# Patient Record
Sex: FEMALE | Race: WHITE | NOT HISPANIC OR LATINO | Employment: FULL TIME | URBAN - METROPOLITAN AREA
[De-identification: names, ages, dates, MRNs, and addresses within clinical notes are randomized per-mention and may not be internally consistent; named-entity substitution may affect disease eponyms.]

---

## 2018-01-15 ENCOUNTER — OFFICE VISIT CONVERTED (OUTPATIENT)
Dept: NEUROSURGERY | Facility: CLINIC | Age: 58
End: 2018-01-15
Attending: PHYSICIAN ASSISTANT

## 2018-07-11 ENCOUNTER — CONVERSION ENCOUNTER (OUTPATIENT)
Dept: MAMMOGRAPHY | Facility: HOSPITAL | Age: 58
End: 2018-07-11

## 2021-01-21 ENCOUNTER — IMMUNIZATION (OUTPATIENT)
Dept: VACCINE CLINIC | Facility: HOSPITAL | Age: 61
End: 2021-01-21

## 2021-01-21 PROCEDURE — 0001A: CPT | Performed by: THORACIC SURGERY (CARDIOTHORACIC VASCULAR SURGERY)

## 2021-01-21 PROCEDURE — 91300 HC SARSCOV02 VAC 30MCG/0.3ML IM: CPT | Performed by: THORACIC SURGERY (CARDIOTHORACIC VASCULAR SURGERY)

## 2021-02-09 ENCOUNTER — IMMUNIZATION (OUTPATIENT)
Dept: VACCINE CLINIC | Facility: HOSPITAL | Age: 61
End: 2021-02-09

## 2021-02-09 PROCEDURE — 0002A: CPT | Performed by: THORACIC SURGERY (CARDIOTHORACIC VASCULAR SURGERY)

## 2021-02-09 PROCEDURE — 91300 HC SARSCOV02 VAC 30MCG/0.3ML IM: CPT | Performed by: THORACIC SURGERY (CARDIOTHORACIC VASCULAR SURGERY)

## 2021-02-11 ENCOUNTER — APPOINTMENT (OUTPATIENT)
Dept: VACCINE CLINIC | Facility: HOSPITAL | Age: 61
End: 2021-02-11

## 2021-05-16 VITALS
WEIGHT: 218 LBS | SYSTOLIC BLOOD PRESSURE: 150 MMHG | HEIGHT: 65 IN | BODY MASS INDEX: 36.32 KG/M2 | DIASTOLIC BLOOD PRESSURE: 96 MMHG

## 2021-08-05 PROCEDURE — U0003 INFECTIOUS AGENT DETECTION BY NUCLEIC ACID (DNA OR RNA); SEVERE ACUTE RESPIRATORY SYNDROME CORONAVIRUS 2 (SARS-COV-2) (CORONAVIRUS DISEASE [COVID-19]), AMPLIFIED PROBE TECHNIQUE, MAKING USE OF HIGH THROUGHPUT TECHNOLOGIES AS DESCRIBED BY CMS-2020-01-R: HCPCS | Performed by: PHYSICIAN ASSISTANT

## 2021-08-07 PROCEDURE — U0003 INFECTIOUS AGENT DETECTION BY NUCLEIC ACID (DNA OR RNA); SEVERE ACUTE RESPIRATORY SYNDROME CORONAVIRUS 2 (SARS-COV-2) (CORONAVIRUS DISEASE [COVID-19]), AMPLIFIED PROBE TECHNIQUE, MAKING USE OF HIGH THROUGHPUT TECHNOLOGIES AS DESCRIBED BY CMS-2020-01-R: HCPCS | Performed by: PHYSICIAN ASSISTANT

## 2021-08-11 ENCOUNTER — APPOINTMENT (OUTPATIENT)
Dept: GENERAL RADIOLOGY | Facility: HOSPITAL | Age: 61
End: 2021-08-11

## 2021-08-11 ENCOUNTER — HOSPITAL ENCOUNTER (INPATIENT)
Facility: HOSPITAL | Age: 61
LOS: 3 days | Discharge: HOME | End: 2021-08-14
Attending: INTERNAL MEDICINE | Admitting: INTERNAL MEDICINE

## 2021-08-11 DIAGNOSIS — I21.4 NSTEMI, INITIAL EPISODE OF CARE (HCC): Primary | ICD-10-CM

## 2021-08-11 DIAGNOSIS — R07.9 CHEST PAIN IN ADULT: ICD-10-CM

## 2021-08-11 LAB
ALBUMIN SERPL-MCNC: 4.3 G/DL (ref 3.5–5.2)
ALBUMIN/GLOB SERPL: 1.3 G/DL
ALP SERPL-CCNC: 112 U/L (ref 39–117)
ALT SERPL W P-5'-P-CCNC: 17 U/L (ref 1–33)
ANION GAP SERPL CALCULATED.3IONS-SCNC: 16.3 MMOL/L (ref 5–15)
AST SERPL-CCNC: 17 U/L (ref 1–32)
BASOPHILS # BLD AUTO: 0.04 10*3/MM3 (ref 0–0.2)
BASOPHILS NFR BLD AUTO: 0.6 % (ref 0–1.5)
BILIRUB SERPL-MCNC: 0.4 MG/DL (ref 0–1.2)
BUN SERPL-MCNC: 13 MG/DL (ref 8–23)
BUN/CREAT SERPL: 12.4 (ref 7–25)
CALCIUM SPEC-SCNC: 9.5 MG/DL (ref 8.6–10.5)
CHLORIDE SERPL-SCNC: 103 MMOL/L (ref 98–107)
CK MB SERPL-CCNC: 4 NG/ML
CK SERPL-CCNC: 82 U/L (ref 20–180)
CO2 SERPL-SCNC: 19.7 MMOL/L (ref 22–29)
CREAT BLDA-MCNC: 1 MG/DL
CREAT SERPL-MCNC: 1.05 MG/DL (ref 0.57–1)
D DIMER PPP FEU-MCNC: 0.49 MG/L (FEU) (ref 0–0.59)
DEPRECATED RDW RBC AUTO: 42.2 FL (ref 37–54)
EOSINOPHIL # BLD AUTO: 0.08 10*3/MM3 (ref 0–0.4)
EOSINOPHIL NFR BLD AUTO: 1.2 % (ref 0.3–6.2)
ERYTHROCYTE [DISTWIDTH] IN BLOOD BY AUTOMATED COUNT: 12.8 % (ref 12.3–15.4)
GFR SERPL CREATININE-BSD FRML MDRD: 53 ML/MIN/1.73
GLOBULIN UR ELPH-MCNC: 3.2 GM/DL
GLUCOSE BLDC GLUCOMTR-MCNC: 173 MG/DL (ref 70–99)
GLUCOSE SERPL-MCNC: 177 MG/DL (ref 65–99)
HCT VFR BLD AUTO: 42.6 % (ref 34–46.6)
HGB BLD-MCNC: 14.7 G/DL (ref 12–15.9)
HOLD SPECIMEN: NORMAL
HOLD SPECIMEN: NORMAL
IMM GRANULOCYTES # BLD AUTO: 0.03 10*3/MM3 (ref 0–0.05)
IMM GRANULOCYTES NFR BLD AUTO: 0.4 % (ref 0–0.5)
INR PPP: 0.93 (ref 2–3)
LYMPHOCYTES # BLD AUTO: 1.3 10*3/MM3 (ref 0.7–3.1)
LYMPHOCYTES NFR BLD AUTO: 18.9 % (ref 19.6–45.3)
MAGNESIUM SERPL-MCNC: 1.9 MG/DL (ref 1.6–2.4)
MCH RBC QN AUTO: 31.2 PG (ref 26.6–33)
MCHC RBC AUTO-ENTMCNC: 34.5 G/DL (ref 31.5–35.7)
MCV RBC AUTO: 90.4 FL (ref 79–97)
MONOCYTES # BLD AUTO: 0.46 10*3/MM3 (ref 0.1–0.9)
MONOCYTES NFR BLD AUTO: 6.7 % (ref 5–12)
NEUTROPHILS NFR BLD AUTO: 4.96 10*3/MM3 (ref 1.7–7)
NEUTROPHILS NFR BLD AUTO: 72.2 % (ref 42.7–76)
NRBC BLD AUTO-RTO: 0 /100 WBC (ref 0–0.2)
PLATELET # BLD AUTO: 242 10*3/MM3 (ref 140–450)
PMV BLD AUTO: 9.3 FL (ref 6–12)
POTASSIUM SERPL-SCNC: 3.6 MMOL/L (ref 3.5–5.2)
PROT SERPL-MCNC: 7.5 G/DL (ref 6–8.5)
PROTHROMBIN TIME: 10.3 SECONDS (ref 9.4–12)
QT INTERVAL: 410 MS
QT INTERVAL: 426 MS
QT INTERVAL: 449 MS
RBC # BLD AUTO: 4.71 10*6/MM3 (ref 3.77–5.28)
SODIUM SERPL-SCNC: 139 MMOL/L (ref 136–145)
WBC # BLD AUTO: 6.87 10*3/MM3 (ref 3.4–10.8)
WHOLE BLOOD HOLD SPECIMEN: NORMAL

## 2021-08-11 PROCEDURE — 93005 ELECTROCARDIOGRAM TRACING: CPT | Performed by: INTERNAL MEDICINE

## 2021-08-11 PROCEDURE — 82962 GLUCOSE BLOOD TEST: CPT

## 2021-08-11 PROCEDURE — 25010000002 ENOXAPARIN PER 10 MG: Performed by: INTERNAL MEDICINE

## 2021-08-11 PROCEDURE — G0378 HOSPITAL OBSERVATION PER HR: HCPCS

## 2021-08-11 PROCEDURE — 85025 COMPLETE CBC W/AUTO DIFF WBC: CPT | Performed by: EMERGENCY MEDICINE

## 2021-08-11 PROCEDURE — 82565 ASSAY OF CREATININE: CPT

## 2021-08-11 PROCEDURE — 99222 1ST HOSP IP/OBS MODERATE 55: CPT | Performed by: INTERNAL MEDICINE

## 2021-08-11 PROCEDURE — 80053 COMPREHEN METABOLIC PANEL: CPT | Performed by: EMERGENCY MEDICINE

## 2021-08-11 PROCEDURE — 84484 ASSAY OF TROPONIN QUANT: CPT

## 2021-08-11 PROCEDURE — 99285 EMERGENCY DEPT VISIT HI MDM: CPT

## 2021-08-11 PROCEDURE — 25010000002 HEPARIN (PORCINE) PER 1000 UNITS: Performed by: EMERGENCY MEDICINE

## 2021-08-11 PROCEDURE — U0003 INFECTIOUS AGENT DETECTION BY NUCLEIC ACID (DNA OR RNA); SEVERE ACUTE RESPIRATORY SYNDROME CORONAVIRUS 2 (SARS-COV-2) (CORONAVIRUS DISEASE [COVID-19]), AMPLIFIED PROBE TECHNIQUE, MAKING USE OF HIGH THROUGHPUT TECHNOLOGIES AS DESCRIBED BY CMS-2020-01-R: HCPCS | Performed by: INTERNAL MEDICINE

## 2021-08-11 PROCEDURE — 83735 ASSAY OF MAGNESIUM: CPT | Performed by: EMERGENCY MEDICINE

## 2021-08-11 PROCEDURE — 85379 FIBRIN DEGRADATION QUANT: CPT | Performed by: EMERGENCY MEDICINE

## 2021-08-11 PROCEDURE — 82550 ASSAY OF CK (CPK): CPT | Performed by: EMERGENCY MEDICINE

## 2021-08-11 PROCEDURE — 93005 ELECTROCARDIOGRAM TRACING: CPT | Performed by: EMERGENCY MEDICINE

## 2021-08-11 PROCEDURE — 71045 X-RAY EXAM CHEST 1 VIEW: CPT

## 2021-08-11 PROCEDURE — 85610 PROTHROMBIN TIME: CPT | Performed by: EMERGENCY MEDICINE

## 2021-08-11 PROCEDURE — 82553 CREATINE MB FRACTION: CPT | Performed by: EMERGENCY MEDICINE

## 2021-08-11 RX ORDER — ONDANSETRON 2 MG/ML
4 INJECTION INTRAMUSCULAR; INTRAVENOUS EVERY 4 HOURS PRN
Status: DISCONTINUED | OUTPATIENT
Start: 2021-08-11 | End: 2021-08-12 | Stop reason: SDUPTHER

## 2021-08-11 RX ORDER — HYDROCODONE BITARTRATE AND ACETAMINOPHEN 5; 325 MG/1; MG/1
1 TABLET ORAL EVERY 4 HOURS PRN
Status: DISCONTINUED | OUTPATIENT
Start: 2021-08-11 | End: 2021-08-12

## 2021-08-11 RX ORDER — ALPRAZOLAM 0.25 MG/1
0.25 TABLET ORAL EVERY 6 HOURS PRN
Status: DISCONTINUED | OUTPATIENT
Start: 2021-08-11 | End: 2021-08-12

## 2021-08-11 RX ORDER — SODIUM CHLORIDE 0.9 % (FLUSH) 0.9 %
10 SYRINGE (ML) INJECTION AS NEEDED
Status: DISCONTINUED | OUTPATIENT
Start: 2021-08-11 | End: 2021-08-14 | Stop reason: HOSPADM

## 2021-08-11 RX ORDER — HEPARIN SODIUM 5000 [USP'U]/ML
INJECTION, SOLUTION INTRAVENOUS; SUBCUTANEOUS
Status: COMPLETED | OUTPATIENT
Start: 2021-08-11 | End: 2021-08-11

## 2021-08-11 RX ORDER — ATORVASTATIN CALCIUM 40 MG/1
40 TABLET, FILM COATED ORAL ONCE
Status: DISCONTINUED | OUTPATIENT
Start: 2021-08-11 | End: 2021-08-12

## 2021-08-11 RX ORDER — SODIUM CHLORIDE 0.9 % (FLUSH) 0.9 %
10 SYRINGE (ML) INJECTION EVERY 12 HOURS SCHEDULED
Status: DISCONTINUED | OUTPATIENT
Start: 2021-08-11 | End: 2021-08-14 | Stop reason: HOSPADM

## 2021-08-11 RX ORDER — PRAVASTATIN SODIUM 20 MG
20 TABLET ORAL DAILY
COMMUNITY
End: 2021-08-14 | Stop reason: HOSPADM

## 2021-08-11 RX ORDER — NALOXONE HCL 0.4 MG/ML
0.4 VIAL (ML) INJECTION
Status: DISCONTINUED | OUTPATIENT
Start: 2021-08-11 | End: 2021-08-14 | Stop reason: HOSPADM

## 2021-08-11 RX ORDER — ALUMINA, MAGNESIA, AND SIMETHICONE 2400; 2400; 240 MG/30ML; MG/30ML; MG/30ML
15 SUSPENSION ORAL EVERY 6 HOURS PRN
Status: DISCONTINUED | OUTPATIENT
Start: 2021-08-11 | End: 2021-08-14 | Stop reason: HOSPADM

## 2021-08-11 RX ORDER — NITROGLYCERIN 0.4 MG/1
0.4 TABLET SUBLINGUAL
Status: DISCONTINUED | OUTPATIENT
Start: 2021-08-11 | End: 2021-08-14 | Stop reason: HOSPADM

## 2021-08-11 RX ORDER — MORPHINE SULFATE 2 MG/ML
2 INJECTION, SOLUTION INTRAMUSCULAR; INTRAVENOUS
Status: DISCONTINUED | OUTPATIENT
Start: 2021-08-11 | End: 2021-08-14 | Stop reason: HOSPADM

## 2021-08-11 RX ORDER — AMOXICILLIN 250 MG
2 CAPSULE ORAL 2 TIMES DAILY
Status: DISCONTINUED | OUTPATIENT
Start: 2021-08-11 | End: 2021-08-14 | Stop reason: HOSPADM

## 2021-08-11 RX ORDER — ACETAMINOPHEN 325 MG/1
650 TABLET ORAL EVERY 4 HOURS PRN
Status: DISCONTINUED | OUTPATIENT
Start: 2021-08-11 | End: 2021-08-12 | Stop reason: SDUPTHER

## 2021-08-11 RX ORDER — TEMAZEPAM 15 MG/1
15 CAPSULE ORAL NIGHTLY PRN
Status: DISCONTINUED | OUTPATIENT
Start: 2021-08-11 | End: 2021-08-14 | Stop reason: HOSPADM

## 2021-08-11 RX ORDER — BISACODYL 10 MG
10 SUPPOSITORY, RECTAL RECTAL DAILY PRN
Status: DISCONTINUED | OUTPATIENT
Start: 2021-08-11 | End: 2021-08-14 | Stop reason: HOSPADM

## 2021-08-11 RX ORDER — ATORVASTATIN CALCIUM 40 MG/1
40 TABLET, FILM COATED ORAL NIGHTLY
Status: DISCONTINUED | OUTPATIENT
Start: 2021-08-11 | End: 2021-08-11

## 2021-08-11 RX ORDER — BISACODYL 5 MG/1
5 TABLET, DELAYED RELEASE ORAL DAILY PRN
Status: DISCONTINUED | OUTPATIENT
Start: 2021-08-11 | End: 2021-08-14 | Stop reason: HOSPADM

## 2021-08-11 RX ORDER — POLYETHYLENE GLYCOL 3350 17 G/17G
17 POWDER, FOR SOLUTION ORAL DAILY PRN
Status: DISCONTINUED | OUTPATIENT
Start: 2021-08-11 | End: 2021-08-14 | Stop reason: HOSPADM

## 2021-08-11 RX ORDER — FAMOTIDINE 20 MG/1
40 TABLET, FILM COATED ORAL DAILY
Status: DISCONTINUED | OUTPATIENT
Start: 2021-08-11 | End: 2021-08-12

## 2021-08-11 RX ADMIN — ENOXAPARIN SODIUM 40 MG: 40 INJECTION SUBCUTANEOUS at 20:16

## 2021-08-11 RX ADMIN — ALPRAZOLAM 0.25 MG: 0.25 TABLET ORAL at 23:23

## 2021-08-11 RX ADMIN — ATORVASTATIN CALCIUM 40 MG: 40 TABLET, FILM COATED ORAL at 15:47

## 2021-08-11 RX ADMIN — HEPARIN SODIUM 5000 UNITS: 5000 INJECTION, SOLUTION INTRAVENOUS; SUBCUTANEOUS at 15:50

## 2021-08-11 RX ADMIN — FAMOTIDINE 40 MG: 20 TABLET, FILM COATED ORAL at 19:43

## 2021-08-11 RX ADMIN — ACETAMINOPHEN 650 MG: 325 TABLET ORAL at 23:23

## 2021-08-11 NOTE — SIGNIFICANT NOTE
08/11/21 1559   Coping/Psychosocial   Observed Emotional State anxious   Verbalized Emotional State anxiety   Trust Relationship/Rapport empathic listening provided   Family/Support Persons son   Involvement in Care at bedside   Spiritual Care   Spiritual Care Visit Type other (see comments)  (Emergency)   Spiritual Care Source other (see comments)  (Emergency)   Spiritual Care Request family support   Response to Spiritual Care thanks expressed;receptive of support   Use of Spiritual Resources non-Church use of spiritual care     Pt looks good.  says she doesn't  Have a heart attack. She is calm and her son is calm too.

## 2021-08-11 NOTE — ED NOTES
PATIENT RECEIVED 324 ASA PTA, CARDIOLOGIST AT BEDSIDE EXPLAINING POSSIBLE CATH FOR PATIENT. PATIENT VERBALIZED UNDERSTANDING. WILL CONTINUE TO MONITOR     Debbie Haskins RN  08/11/21 8287

## 2021-08-11 NOTE — ED PROVIDER NOTES
Time: 15:45 EDT  Arrived by: EMS  Chief Complaint: Chest pain  History provided by: Patient  History is limited by: N/A    History of Present Illness:  Patient is a 61 y.o. year old female that presents to the emergency department with chest pain that began earlier today after eating. Patient also complains of nausea and diaphoresis. She has a history of HTN.        History provided by:  Patient and EMS personnel  Chest Pain  Pain radiates to:  L shoulder and R shoulder  Pain severity:  Mild  Onset quality:  Sudden  Timing:  Constant  Chronicity:  New  Associated symptoms: diaphoresis and nausea    Associated symptoms: no abdominal pain, no cough, no fever, no headache, no palpitations, no shortness of breath and no vomiting    Risk factors: hypertension      Patient Care Team  Primary Care Provider: Chelsy Maynard    Past Medical History:     Allergies   Allergen Reactions   • Eggs Or Egg-Derived Products Anaphylaxis   • Erythromycin Unknown - High Severity     Past Medical History:   Diagnosis Date   • Anxiety    • Benign essential tremor    • Depression    • Hypertension      Past Surgical History:   Procedure Laterality Date   •  SECTION     • CHOLECYSTECTOMY     • HYSTERECTOMY       History reviewed. No pertinent family history.    Home Medications:  Prior to Admission medications    Medication Sig Start Date End Date Taking? Authorizing Provider   citalopram (CeleXA) 20 MG tablet Celexa 20 mg oral tablet take 1 tablet (20 mg) by oral route once daily   Active    Emergency, Nurse Epic, RN   fluticasone (FLONASE) 50 MCG/ACT nasal spray INSTILL 1 SPRAY IN EACH NOSTRIL ONCE DAILY 21   Emergency, Nurse RIAZ Stapleton   ibuprofen (ADVIL,MOTRIN) 200 MG tablet ibuprofen 200 mg oral tablet take 1 tablet (200 mg) by oral route every 6 hours as needed   Active    Emergency, Nurse Epic, RN   irbesartan (AVAPRO) 300 MG tablet Take 300 mg by mouth Every Night.    Emergency, Nurse RIAZ Stapleton        Social History:   PT   "reports that she has never smoked. She has never used smokeless tobacco. She reports that she does not drink alcohol and does not use drugs.    Record Review:  I have reviewed the patient's records in [facility].     Review of Systems  Review of Systems   Constitutional: Positive for diaphoresis. Negative for chills and fever.   HENT: Negative for congestion, rhinorrhea and sore throat.    Eyes: Negative for pain and visual disturbance.   Respiratory: Negative for apnea, cough, chest tightness and shortness of breath.    Cardiovascular: Positive for chest pain. Negative for palpitations.   Gastrointestinal: Positive for nausea. Negative for abdominal pain, diarrhea and vomiting.   Genitourinary: Negative for difficulty urinating and dysuria.   Musculoskeletal: Negative for joint swelling and myalgias.   Skin: Negative for color change.   Neurological: Negative for seizures and headaches.   Psychiatric/Behavioral: Negative.    All other systems reviewed and are negative.       Physical Exam  /75   Pulse 81   Temp 97.9 °F (36.6 °C) (Oral)   Resp 18   Ht 165.1 cm (65\")   Wt 101 kg (222 lb 14.2 oz)   LMP  (LMP Unknown)   SpO2 98%   BMI 37.09 kg/m²     Physical Exam  Vitals and nursing note reviewed.   Constitutional:       General: She is not in acute distress.     Appearance: Normal appearance. She is not toxic-appearing.   HENT:      Head: Normocephalic and atraumatic.      Jaw: There is normal jaw occlusion.   Eyes:      General: Lids are normal.      Extraocular Movements: Extraocular movements intact.      Conjunctiva/sclera: Conjunctivae normal.      Pupils: Pupils are equal, round, and reactive to light.   Cardiovascular:      Rate and Rhythm: Normal rate and regular rhythm.      Pulses: Normal pulses.      Heart sounds: Normal heart sounds.   Pulmonary:      Effort: Pulmonary effort is normal. No respiratory distress.      Breath sounds: Normal breath sounds. No wheezing or rhonchi.   Abdominal:      " "General: Abdomen is flat. A surgical scar is present.      Palpations: Abdomen is soft.      Tenderness: There is no abdominal tenderness. There is no guarding or rebound.      Comments: RUQ healed surgical incision from a cholecystectomy   Musculoskeletal:         General: Normal range of motion.      Cervical back: Normal range of motion and neck supple.      Right lower leg: No edema.      Left lower leg: No edema.   Skin:     General: Skin is warm and dry.   Neurological:      Mental Status: She is alert and oriented to person, place, and time. Mental status is at baseline.   Psychiatric:         Mood and Affect: Mood normal.                  ED Course  /75   Pulse 81   Temp 97.9 °F (36.6 °C) (Oral)   Resp 18   Ht 165.1 cm (65\")   Wt 101 kg (222 lb 14.2 oz)   LMP  (LMP Unknown)   SpO2 98%   BMI 37.09 kg/m²   Results for orders placed or performed during the hospital encounter of 08/11/21   CK Total & CKMB    Specimen: Blood   Result Value Ref Range    CKMB 4.00 <=5.30 ng/mL    Creatine Kinase 82 20 - 180 U/L   Comprehensive Metabolic Panel    Specimen: Blood   Result Value Ref Range    Glucose 177 (H) 65 - 99 mg/dL    BUN 13 8 - 23 mg/dL    Creatinine 1.05 (H) 0.57 - 1.00 mg/dL    Sodium 139 136 - 145 mmol/L    Potassium 3.6 3.5 - 5.2 mmol/L    Chloride 103 98 - 107 mmol/L    CO2 19.7 (L) 22.0 - 29.0 mmol/L    Calcium 9.5 8.6 - 10.5 mg/dL    Total Protein 7.5 6.0 - 8.5 g/dL    Albumin 4.30 3.50 - 5.20 g/dL    ALT (SGPT) 17 1 - 33 U/L    AST (SGOT) 17 1 - 32 U/L    Alkaline Phosphatase 112 39 - 117 U/L    Total Bilirubin 0.4 0.0 - 1.2 mg/dL    eGFR Non African Amer 53 (L) >60 mL/min/1.73    Globulin 3.2 gm/dL    A/G Ratio 1.3 g/dL    BUN/Creatinine Ratio 12.4 7.0 - 25.0    Anion Gap 16.3 (H) 5.0 - 15.0 mmol/L   Magnesium    Specimen: Blood   Result Value Ref Range    Magnesium 1.9 1.6 - 2.4 mg/dL   Protime-INR    Specimen: Blood   Result Value Ref Range    Protime 10.3 9.4 - 12.0 Seconds    INR 0.93 " (L) 2.00 - 3.00   CBC Auto Differential    Specimen: Blood   Result Value Ref Range    WBC 6.87 3.40 - 10.80 10*3/mm3    RBC 4.71 3.77 - 5.28 10*6/mm3    Hemoglobin 14.7 12.0 - 15.9 g/dL    Hematocrit 42.6 34.0 - 46.6 %    MCV 90.4 79.0 - 97.0 fL    MCH 31.2 26.6 - 33.0 pg    MCHC 34.5 31.5 - 35.7 g/dL    RDW 12.8 12.3 - 15.4 %    RDW-SD 42.2 37.0 - 54.0 fl    MPV 9.3 6.0 - 12.0 fL    Platelets 242 140 - 450 10*3/mm3    Neutrophil % 72.2 42.7 - 76.0 %    Lymphocyte % 18.9 (L) 19.6 - 45.3 %    Monocyte % 6.7 5.0 - 12.0 %    Eosinophil % 1.2 0.3 - 6.2 %    Basophil % 0.6 0.0 - 1.5 %    Immature Grans % 0.4 0.0 - 0.5 %    Neutrophils, Absolute 4.96 1.70 - 7.00 10*3/mm3    Lymphocytes, Absolute 1.30 0.70 - 3.10 10*3/mm3    Monocytes, Absolute 0.46 0.10 - 0.90 10*3/mm3    Eosinophils, Absolute 0.08 0.00 - 0.40 10*3/mm3    Basophils, Absolute 0.04 0.00 - 0.20 10*3/mm3    Immature Grans, Absolute 0.03 0.00 - 0.05 10*3/mm3    nRBC 0.0 0.0 - 0.2 /100 WBC   D-dimer, Quantitative    Specimen: Blood   Result Value Ref Range    D-Dimer, Quantitative 0.49 0.00 - 0.59 mg/L (FEU)   POC Creatinine    Specimen: Blood   Result Value Ref Range    Creatinine 1.00 mg/dL    GFR MDRD       GFR MDRD Non African American 56 mL/min/1.73 sq.M   POC Glucose Once    Specimen: Blood   Result Value Ref Range    Glucose 173 (H) 70 - 99 mg/dL   ECG 12 Lead   Result Value Ref Range    QT Interval 410 ms   Green Top (Gel)   Result Value Ref Range    Extra Tube Hold for add-ons.    Lavender Top   Result Value Ref Range    Extra Tube hold for add-on    Gold Top - SST   Result Value Ref Range    Extra Tube Hold for add-ons.      Medications   sodium chloride 0.9 % flush 10 mL (has no administration in time range)   atorvastatin (LIPITOR) tablet 40 mg ( Oral Canceled Entry 8/11/21 1915)   heparin (porcine) 5000 UNIT/ML injection (5,000 Units Intravenous Given 8/11/21 1550)     XR Chest 1 View    Result Date: 8/11/2021  Narrative:  PROCEDURE: XR CHEST 1 VW  COMPARISON: None  INDICATIONS: CHEST PAIN  FINDINGS:  The heart appears mildly enlarged.  Mediastinal contour appears grossly normal.  No suspicious focal or diffuse pulmonary infiltrate is seen.  No pleural effusion or pneumothorax is identified.  No definite acute osseous abnormality is seen.  CONCLUSION:  1. Cardiomegaly. 2. No definite radiographic findings of acute cardiopulmonary abnormality.       ARMINDA MADDEN MD       Electronically Signed and Approved By: ARMINDA MADDEN MD on 8/11/2021 at 17:08               Procedures/EKGs:  Procedures    Medical Decision Making:                         MDM  Number of Diagnoses or Management Options  Chest pain in adult  Diagnosis management comments: Patient presents with chest pain as a possible STEMI alert.  EKG was faxed by EMS prior to patient arrival and concerning for STEMI.  Repeat EKG was performed following patient arrival which did not demonstrate STEMI.  Cardiology consultation been obtained prior to the patient's arrival the cardiologist was at the bedside upon patient arrival to the ED.  Patient improvement in her pain was admitted to medicine service with cardiology consultation obtained.  Differential considerations include but are not limited to STEMI versus non-STEMI MI versus unstable angina or pulmonary embolism.  D-dimer is normal making PE unlikely.  Patient was stable with minimal discomfort on final assessment and admitted to medicine service.  Chest radiograph is read by radiology and reviewed by me did not demonstrate pneumonia or pneumothorax as a source of the patient's symptoms chemistry panel showed glucose 177 CBC showed normal white count.  EKG did not demonstrate acute definitive ischemic changes.       Amount and/or Complexity of Data Reviewed  Clinical lab tests: reviewed  Tests in the radiology section of CPT®: reviewed    Risk of Complications, Morbidity, and/or Mortality  Presenting problems: high    Critical  Care  Total time providing critical care: < 30 minutes       Final diagnoses:   Chest pain in adult          Disposition:  ED Disposition     ED Disposition Condition Comment    Decision to Admit  Level of Care: Telemetry [5]   Diagnosis: Chest pain in adult [[id number]]            Documentation assistance provided by Sherron Moreno acting as scribe for Julio Cesar Davis MD. Information recorded by the scribe was done at my direction and has been verified and validated by me.        Sherron Moreno  08/11/21 9394       Julio Cesar Davis MD  08/11/21 0266

## 2021-08-11 NOTE — ED NOTES
PATIENT TO BE ADMITTED PER CARDIOLOGY FOR OBSERVATION, STATES NO NEED FOR CATH LAB AT THIS TIME     Debbie Haskins, RN  08/11/21 4034

## 2021-08-11 NOTE — CONSULTS
[facility]    CARDIOLOGY CONSULT NOTE    Patient Name: Carley Deng  : 1960  MRN: [id number]  Primary Care Physician:  Chelsy Maynard PA-C  Date of admission: 2021    Subjective   Subjective     Chief Complaint: chest pain/epigastric pain    HPI:  Carley Deng is a 61 y.o. female with a history of hypertension, hyperlipidemia, and obesity who presented to the [facility] ED this afternoon after developing epigastric pain and substernal chest pain after eating.  She states the pain radiated to both shoulders and her upper arms.  She had some nausea earlier this afternoon and her pain at maximum intensity was 7/10; it is now significantly improved at 1/10.  She also reported some diaphoresis, now resolved.  An ECG transmitted by EMS in the field was initially felt by the ED physician to be concerning for STEMI (I reviewed it and there was minimal, non-diagnostic ST elevation in leads I and aVL with upright T waves and no evidence of reciprocal changes, so not consistent with a STEMI).  A repeat ECG obtained upon arrival in the ED showed sinus rhythm and was normal.  The patient's initial iSTAT Troponin was negative in the ED.  She does not report any history of known cardiac problems or any history of exertional chest discomfort.  She has mild chronic exertional dyspnea, but states this has been stable for several years with no decrease in her exercise tolerance or physical activity level.  No history of diabetes mellitus or tobacco use reported.  She does have a family history of coronary artery disease (her father), although not premature CAD.     Review of Systems   Constitutional: Positive for fatigue. Negative for activity change, chills, fever and unexpected weight change.   HENT: Negative for hearing loss, sore throat and trouble swallowing.    Eyes: Negative for pain and visual disturbance.   Respiratory: Negative for chest tightness, shortness of breath and wheezing.     Cardiovascular: Positive for chest pain. Negative for palpitations and leg swelling.   Gastrointestinal: Positive for abdominal pain. Negative for abdominal distention, blood in stool and vomiting.   Endocrine: Negative for cold intolerance and heat intolerance.   Genitourinary: Negative for dysuria and hematuria.   Musculoskeletal: Negative for arthralgias and joint swelling.   Skin: Negative for color change, pallor and rash.   Neurological: Negative for syncope, speech difficulty, weakness, light-headedness and headaches.   Hematological: Negative for adenopathy. Does not bruise/bleed easily.        Personal History     Past Medical History:   Diagnosis Date   • Anxiety    • Benign essential tremor    • Depression    • Hypertension        Past Surgical History:   Procedure Laterality Date   •  SECTION     • CHOLECYSTECTOMY     • HYSTERECTOMY         Family History: family history is not on file. Otherwise pertinent FHx was reviewed and not pertinent to current issue.    Social History:  reports that she has never smoked. She has never used smokeless tobacco. She reports that she does not drink alcohol and does not use drugs.    Home Medications:  citalopram, fluticasone, ibuprofen, and irbesartan    Allergies:  Allergies   Allergen Reactions   • Eggs Or Egg-Derived Products Anaphylaxis   • Erythromycin Unknown - High Severity       Objective    Objective     Vitals:   Temp:  [97.9 °F (36.6 °C)] 97.9 °F (36.6 °C)  Heart Rate:  [92] 92  Resp:  [16] 16  BP: (159)/(76) 159/76    Physical Exam  Constitutional:       General: She is not in acute distress.     Appearance: Normal appearance.   HENT:      Head: Atraumatic.      Mouth/Throat:      Mouth: Mucous membranes are moist.      Pharynx: Oropharynx is clear. No oropharyngeal exudate.   Eyes:      General: No scleral icterus.     Conjunctiva/sclera: Conjunctivae normal.   Neck:      Vascular: No carotid bruit or JVD.   Cardiovascular:      Rate and Rhythm:  Normal rate and regular rhythm.  No extrasystoles are present.     Chest Wall: PMI is not displaced.      Pulses: Normal pulses.           Radial pulses are 2+ on the right side and 2+ on the left side.      Heart sounds: S1 normal and S2 normal. No murmur heard.   No friction rub. No gallop. No S3 or S4 sounds.    Pulmonary:      Effort: Pulmonary effort is normal. No tachypnea or respiratory distress.      Breath sounds: No decreased breath sounds, wheezing, rhonchi or rales.   Chest:      Chest wall: No tenderness.   Abdominal:      General: Bowel sounds are normal. There is no distension.      Palpations: Abdomen is soft. There is no mass.      Tenderness: There is no abdominal tenderness.      Comments: Obese.   Musculoskeletal:         General: No swelling, tenderness or deformity.      Cervical back: Neck supple. No tenderness.      Right lower leg: No edema.      Left lower leg: No edema.   Skin:     General: Skin is warm and dry.      Coloration: Skin is not jaundiced.      Findings: No erythema or rash.      Nails: There is no clubbing.   Neurological:      General: No focal deficit present.      Mental Status: She is alert and oriented to person, place, and time.      Motor: No weakness.   Psychiatric:         Mood and Affect: Mood normal.         Behavior: Behavior normal.         Result Review    Result Review:  I have personally reviewed the results from the time of this admission to 8/11/2021 16:43 EDT and agree with these findings:  [x]  Laboratory  []  Microbiology  [x]  Radiology  [x]  EKG/Telemetry   []  Cardiology/Vascular   []  Pathology  []  Old records  []  Other:  Most notable findings include: CXR showed cardiomegaly, but no acute cardiopulmonary disease; Cr=1.05, glucose = 177    Assessment/Plan   Assessment / Plan     Brief Patient Summary:  Carley Deng is a 61 y.o. female with:  -Atypical chest pain, improving; ECG upon arrival showed sinus rhythm and was unremarkable and initial  iSTAT Troponin was negative  -Abdominal pain  -Essential hypertension  -Obesity, BMI=36.5  -Hyperlipidemia, on chronic pravastatin therapy  -Hyperglycemia     Active Hospital Problems:  Active Hospital Problems    Diagnosis    • Abdominal pain    • Hyperglycemia    • Chest pain in adult        Plan:   -No clear indication for LHC at present time, as no objective evidence of ACS thus far.  Will follow serial ECGs and Troponin levels to rule out ACS.  If ruled out, will plan to proceed with a SPECT study for cardiovascular risk stratification in the morning.    -Start aspirin 81 mg daily and high-intensity statin   -Will check an echocardiogram due to cardiomegaly noted on CXR  -Check a fasting lipid panel and HgbA1c in the morning.  Significant weight loss was strongly recommended.  -Start NTG drip for recurrent chest pain  -Continue her outpatient meds for hypertension  -Start PPI and will defer workup of epigastric abdominal pain to hospitalist       DVT prophylaxis:  No DVT prophylaxis order currently exists.    CODE STATUS:    Code Status: CPR  Medical Interventions (Level of Support Prior to Arrest): Full      Electronically signed by Humza Herrera MD, 08/11/21, 4:43 PM EDT.

## 2021-08-12 PROBLEM — R10.9 ABDOMINAL PAIN: Chronic | Status: ACTIVE | Noted: 2021-08-12

## 2021-08-12 PROBLEM — R73.9 HYPERGLYCEMIA: Status: ACTIVE | Noted: 2021-08-12

## 2021-08-12 PROBLEM — I21.4 NSTEMI, INITIAL EPISODE OF CARE: Status: ACTIVE | Noted: 2021-08-11

## 2021-08-12 LAB
ACT BLD: 219 SECONDS (ref 89–137)
ACT BLD: 224 SECONDS (ref 89–137)
ACT BLD: 224 SECONDS (ref 89–137)
ACT BLD: 246 SECONDS (ref 89–137)
ACT BLD: 285 SECONDS (ref 89–137)
ANION GAP SERPL CALCULATED.3IONS-SCNC: 12.5 MMOL/L (ref 5–15)
APTT PPP: 23.4 SECONDS (ref 22.2–34.2)
BASOPHILS # BLD AUTO: 0.03 10*3/MM3 (ref 0–0.2)
BASOPHILS NFR BLD AUTO: 0.4 % (ref 0–1.5)
BUN SERPL-MCNC: 11 MG/DL (ref 8–23)
BUN/CREAT SERPL: 12 (ref 7–25)
CALCIUM SPEC-SCNC: 9.1 MG/DL (ref 8.6–10.5)
CHLORIDE SERPL-SCNC: 105 MMOL/L (ref 98–107)
CHOLEST SERPL-MCNC: 207 MG/DL (ref 0–200)
CK MB SERPL-CCNC: 45.38 NG/ML
CK SERPL-CCNC: 407 U/L (ref 20–180)
CO2 SERPL-SCNC: 20.5 MMOL/L (ref 22–29)
CREAT SERPL-MCNC: 0.92 MG/DL (ref 0.57–1)
DEPRECATED RDW RBC AUTO: 42.4 FL (ref 37–54)
EOSINOPHIL # BLD AUTO: 0.09 10*3/MM3 (ref 0–0.4)
EOSINOPHIL NFR BLD AUTO: 1.1 % (ref 0.3–6.2)
ERYTHROCYTE [DISTWIDTH] IN BLOOD BY AUTOMATED COUNT: 13 % (ref 12.3–15.4)
GFR SERPL CREATININE-BSD FRML MDRD: 62 ML/MIN/1.73
GLUCOSE SERPL-MCNC: 141 MG/DL (ref 65–99)
HBA1C MFR BLD: 6.07 % (ref 4.8–5.6)
HCT VFR BLD AUTO: 41.2 % (ref 34–46.6)
HDLC SERPL-MCNC: 53 MG/DL (ref 40–60)
HGB BLD-MCNC: 14.3 G/DL (ref 12–15.9)
IMM GRANULOCYTES # BLD AUTO: 0.02 10*3/MM3 (ref 0–0.05)
IMM GRANULOCYTES NFR BLD AUTO: 0.2 % (ref 0–0.5)
INR PPP: 0.96 (ref 2–3)
LDLC SERPL CALC-MCNC: 126 MG/DL (ref 0–100)
LDLC/HDLC SERPL: 2.31 {RATIO}
LYMPHOCYTES # BLD AUTO: 0.96 10*3/MM3 (ref 0.7–3.1)
LYMPHOCYTES NFR BLD AUTO: 11.9 % (ref 19.6–45.3)
MCH RBC QN AUTO: 31.1 PG (ref 26.6–33)
MCHC RBC AUTO-ENTMCNC: 34.7 G/DL (ref 31.5–35.7)
MCV RBC AUTO: 89.6 FL (ref 79–97)
MONOCYTES # BLD AUTO: 0.54 10*3/MM3 (ref 0.1–0.9)
MONOCYTES NFR BLD AUTO: 6.7 % (ref 5–12)
NEUTROPHILS NFR BLD AUTO: 6.42 10*3/MM3 (ref 1.7–7)
NEUTROPHILS NFR BLD AUTO: 79.7 % (ref 42.7–76)
NRBC BLD AUTO-RTO: 0 /100 WBC (ref 0–0.2)
PLATELET # BLD AUTO: 238 10*3/MM3 (ref 140–450)
PMV BLD AUTO: 9.3 FL (ref 6–12)
POTASSIUM SERPL-SCNC: 4 MMOL/L (ref 3.5–5.2)
PROTHROMBIN TIME: 10.6 SECONDS (ref 9.4–12)
QT INTERVAL: 404 MS
QT INTERVAL: 439 MS
RBC # BLD AUTO: 4.6 10*6/MM3 (ref 3.77–5.28)
SARS-COV-2 RNA RESP QL NAA+PROBE: NOT DETECTED
SODIUM SERPL-SCNC: 138 MMOL/L (ref 136–145)
TRIGL SERPL-MCNC: 157 MG/DL (ref 0–150)
TROPONIN T SERPL-MCNC: 0.78 NG/ML (ref 0–0.03)
TSH SERPL DL<=0.05 MIU/L-ACNC: 1.96 UIU/ML (ref 0.27–4.2)
VLDLC SERPL-MCNC: 28 MG/DL (ref 5–40)
WBC # BLD AUTO: 8.06 10*3/MM3 (ref 3.4–10.8)

## 2021-08-12 PROCEDURE — 25010000002 MIDAZOLAM PER 1MG: Performed by: INTERNAL MEDICINE

## 2021-08-12 PROCEDURE — 85025 COMPLETE CBC W/AUTO DIFF WBC: CPT | Performed by: INTERNAL MEDICINE

## 2021-08-12 PROCEDURE — 84484 ASSAY OF TROPONIN QUANT: CPT | Performed by: INTERNAL MEDICINE

## 2021-08-12 PROCEDURE — 0 IOPAMIDOL PER 1 ML: Performed by: INTERNAL MEDICINE

## 2021-08-12 PROCEDURE — 25010000002 HEPARIN (PORCINE) PER 1000 UNITS: Performed by: INTERNAL MEDICINE

## 2021-08-12 PROCEDURE — B2111ZZ FLUOROSCOPY OF MULTIPLE CORONARY ARTERIES USING LOW OSMOLAR CONTRAST: ICD-10-PCS | Performed by: INTERNAL MEDICINE

## 2021-08-12 PROCEDURE — 93005 ELECTROCARDIOGRAM TRACING: CPT | Performed by: INTERNAL MEDICINE

## 2021-08-12 PROCEDURE — 99222 1ST HOSP IP/OBS MODERATE 55: CPT | Performed by: INTERNAL MEDICINE

## 2021-08-12 PROCEDURE — 92928 PRQ TCAT PLMT NTRAC ST 1 LES: CPT | Performed by: INTERNAL MEDICINE

## 2021-08-12 PROCEDURE — C1769 GUIDE WIRE: HCPCS | Performed by: INTERNAL MEDICINE

## 2021-08-12 PROCEDURE — C9600 PERC DRUG-EL COR STENT SING: HCPCS | Performed by: INTERNAL MEDICINE

## 2021-08-12 PROCEDURE — 82550 ASSAY OF CK (CPK): CPT | Performed by: INTERNAL MEDICINE

## 2021-08-12 PROCEDURE — 25010000002 MORPHINE (PF) 10 MG/ML SOLUTION: Performed by: INTERNAL MEDICINE

## 2021-08-12 PROCEDURE — 80061 LIPID PANEL: CPT | Performed by: INTERNAL MEDICINE

## 2021-08-12 PROCEDURE — 4A023N7 MEASUREMENT OF CARDIAC SAMPLING AND PRESSURE, LEFT HEART, PERCUTANEOUS APPROACH: ICD-10-PCS | Performed by: INTERNAL MEDICINE

## 2021-08-12 PROCEDURE — 80048 BASIC METABOLIC PNL TOTAL CA: CPT | Performed by: INTERNAL MEDICINE

## 2021-08-12 PROCEDURE — 93458 L HRT ARTERY/VENTRICLE ANGIO: CPT | Performed by: INTERNAL MEDICINE

## 2021-08-12 PROCEDURE — 84443 ASSAY THYROID STIM HORMONE: CPT | Performed by: INTERNAL MEDICINE

## 2021-08-12 PROCEDURE — C1725 CATH, TRANSLUMIN NON-LASER: HCPCS | Performed by: INTERNAL MEDICINE

## 2021-08-12 PROCEDURE — 85347 COAGULATION TIME ACTIVATED: CPT

## 2021-08-12 PROCEDURE — 25010000002 MORPHINE PER 10 MG: Performed by: INTERNAL MEDICINE

## 2021-08-12 PROCEDURE — 027034Z DILATION OF CORONARY ARTERY, ONE ARTERY WITH DRUG-ELUTING INTRALUMINAL DEVICE, PERCUTANEOUS APPROACH: ICD-10-PCS | Performed by: INTERNAL MEDICINE

## 2021-08-12 PROCEDURE — C1874 STENT, COATED/COV W/DEL SYS: HCPCS | Performed by: INTERNAL MEDICINE

## 2021-08-12 PROCEDURE — 85610 PROTHROMBIN TIME: CPT | Performed by: INTERNAL MEDICINE

## 2021-08-12 PROCEDURE — 82553 CREATINE MB FRACTION: CPT | Performed by: INTERNAL MEDICINE

## 2021-08-12 PROCEDURE — 92978 ENDOLUMINL IVUS OCT C 1ST: CPT | Performed by: INTERNAL MEDICINE

## 2021-08-12 PROCEDURE — C1887 CATHETER, GUIDING: HCPCS | Performed by: INTERNAL MEDICINE

## 2021-08-12 PROCEDURE — 99153 MOD SED SAME PHYS/QHP EA: CPT | Performed by: INTERNAL MEDICINE

## 2021-08-12 PROCEDURE — C1894 INTRO/SHEATH, NON-LASER: HCPCS | Performed by: INTERNAL MEDICINE

## 2021-08-12 PROCEDURE — B221Z2Z COMPUTERIZED TOMOGRAPHY (CT SCAN) OF MULTIPLE CORONARY ARTERIES USING INTRAVASCULAR OPTICAL COHERENCE: ICD-10-PCS | Performed by: INTERNAL MEDICINE

## 2021-08-12 PROCEDURE — 85730 THROMBOPLASTIN TIME PARTIAL: CPT | Performed by: INTERNAL MEDICINE

## 2021-08-12 PROCEDURE — 25010000002 ONDANSETRON PER 1 MG: Performed by: INTERNAL MEDICINE

## 2021-08-12 PROCEDURE — B2151ZZ FLUOROSCOPY OF LEFT HEART USING LOW OSMOLAR CONTRAST: ICD-10-PCS | Performed by: INTERNAL MEDICINE

## 2021-08-12 PROCEDURE — 25010000002 FENTANYL CITRATE (PF) 100 MCG/2ML SOLUTION: Performed by: INTERNAL MEDICINE

## 2021-08-12 PROCEDURE — 99152 MOD SED SAME PHYS/QHP 5/>YRS: CPT | Performed by: INTERNAL MEDICINE

## 2021-08-12 PROCEDURE — C1753 CATH, INTRAVAS ULTRASOUND: HCPCS | Performed by: INTERNAL MEDICINE

## 2021-08-12 PROCEDURE — 99232 SBSQ HOSP IP/OBS MODERATE 35: CPT | Performed by: INTERNAL MEDICINE

## 2021-08-12 PROCEDURE — 83036 HEMOGLOBIN GLYCOSYLATED A1C: CPT | Performed by: INTERNAL MEDICINE

## 2021-08-12 DEVICE — XIENCE SIERRA™ EVEROLIMUS ELUTING CORONARY STENT SYSTEM 3.00 MM X 38 MM / RAPID-EXCHANGE
Type: IMPLANTABLE DEVICE | Status: FUNCTIONAL
Brand: XIENCE SIERRA™

## 2021-08-12 RX ORDER — SODIUM CHLORIDE 9 MG/ML
1 INJECTION, SOLUTION INTRAVENOUS CONTINUOUS
Status: DISCONTINUED | OUTPATIENT
Start: 2021-08-12 | End: 2021-08-12

## 2021-08-12 RX ORDER — ASPIRIN 81 MG/1
81 TABLET ORAL DAILY
Status: DISCONTINUED | OUTPATIENT
Start: 2021-08-12 | End: 2021-08-14 | Stop reason: HOSPADM

## 2021-08-12 RX ORDER — HEPARIN SODIUM 1000 [USP'U]/ML
INJECTION, SOLUTION INTRAVENOUS; SUBCUTANEOUS AS NEEDED
Status: DISCONTINUED | OUTPATIENT
Start: 2021-08-12 | End: 2021-08-12 | Stop reason: HOSPADM

## 2021-08-12 RX ORDER — SODIUM CHLORIDE 9 MG/ML
100 INJECTION, SOLUTION INTRAVENOUS CONTINUOUS
Status: DISCONTINUED | OUTPATIENT
Start: 2021-08-12 | End: 2021-08-14

## 2021-08-12 RX ORDER — NITROGLYCERIN 10 MG/100ML
INJECTION INTRAVENOUS CONTINUOUS PRN
Status: COMPLETED | OUTPATIENT
Start: 2021-08-12 | End: 2021-08-12

## 2021-08-12 RX ORDER — VERAPAMIL HYDROCHLORIDE 2.5 MG/ML
INJECTION, SOLUTION INTRAVENOUS AS NEEDED
Status: DISCONTINUED | OUTPATIENT
Start: 2021-08-12 | End: 2021-08-12 | Stop reason: HOSPADM

## 2021-08-12 RX ORDER — FAMOTIDINE 10 MG/ML
20 INJECTION, SOLUTION INTRAVENOUS EVERY 12 HOURS SCHEDULED
Status: DISCONTINUED | OUTPATIENT
Start: 2021-08-12 | End: 2021-08-14 | Stop reason: HOSPADM

## 2021-08-12 RX ORDER — ONDANSETRON 2 MG/ML
4 INJECTION INTRAMUSCULAR; INTRAVENOUS EVERY 6 HOURS PRN
Status: DISCONTINUED | OUTPATIENT
Start: 2021-08-12 | End: 2021-08-14 | Stop reason: HOSPADM

## 2021-08-12 RX ORDER — NITROGLYCERIN 400 UG/1
SPRAY ORAL AS NEEDED
Status: DISCONTINUED | OUTPATIENT
Start: 2021-08-12 | End: 2021-08-12 | Stop reason: HOSPADM

## 2021-08-12 RX ORDER — NITROGLYCERIN 5 MG/ML
INJECTION, SOLUTION INTRAVENOUS AS NEEDED
Status: DISCONTINUED | OUTPATIENT
Start: 2021-08-12 | End: 2021-08-12 | Stop reason: HOSPADM

## 2021-08-12 RX ORDER — ACETAMINOPHEN 325 MG/1
650 TABLET ORAL EVERY 4 HOURS PRN
Status: DISCONTINUED | OUTPATIENT
Start: 2021-08-12 | End: 2021-08-14 | Stop reason: HOSPADM

## 2021-08-12 RX ORDER — FENTANYL CITRATE 50 UG/ML
INJECTION, SOLUTION INTRAMUSCULAR; INTRAVENOUS AS NEEDED
Status: DISCONTINUED | OUTPATIENT
Start: 2021-08-12 | End: 2021-08-12 | Stop reason: HOSPADM

## 2021-08-12 RX ORDER — LOSARTAN POTASSIUM 50 MG/1
50 TABLET ORAL
Status: DISCONTINUED | OUTPATIENT
Start: 2021-08-12 | End: 2021-08-14 | Stop reason: HOSPADM

## 2021-08-12 RX ORDER — NICARDIPINE HYDROCHLORIDE 2.5 MG/ML
INJECTION INTRAVENOUS AS NEEDED
Status: DISCONTINUED | OUTPATIENT
Start: 2021-08-12 | End: 2021-08-12 | Stop reason: HOSPADM

## 2021-08-12 RX ORDER — MORPHINE SULFATE 10 MG/ML
INJECTION, SOLUTION INTRAMUSCULAR; INTRAVENOUS AS NEEDED
Status: DISCONTINUED | OUTPATIENT
Start: 2021-08-12 | End: 2021-08-12 | Stop reason: HOSPADM

## 2021-08-12 RX ORDER — LIDOCAINE HYDROCHLORIDE 20 MG/ML
INJECTION, SOLUTION INFILTRATION; PERINEURAL AS NEEDED
Status: DISCONTINUED | OUTPATIENT
Start: 2021-08-12 | End: 2021-08-12 | Stop reason: HOSPADM

## 2021-08-12 RX ORDER — ALPRAZOLAM 0.25 MG/1
0.5 TABLET ORAL 3 TIMES DAILY PRN
Status: DISCONTINUED | OUTPATIENT
Start: 2021-08-12 | End: 2021-08-14 | Stop reason: HOSPADM

## 2021-08-12 RX ORDER — CARVEDILOL 6.25 MG/1
6.25 TABLET ORAL 2 TIMES DAILY WITH MEALS
Status: DISCONTINUED | OUTPATIENT
Start: 2021-08-12 | End: 2021-08-14 | Stop reason: HOSPADM

## 2021-08-12 RX ORDER — MIDAZOLAM HYDROCHLORIDE 2 MG/2ML
INJECTION, SOLUTION INTRAMUSCULAR; INTRAVENOUS AS NEEDED
Status: DISCONTINUED | OUTPATIENT
Start: 2021-08-12 | End: 2021-08-12 | Stop reason: HOSPADM

## 2021-08-12 RX ORDER — HYDROCODONE BITARTRATE AND ACETAMINOPHEN 5; 325 MG/1; MG/1
1 TABLET ORAL EVERY 4 HOURS PRN
Status: DISCONTINUED | OUTPATIENT
Start: 2021-08-12 | End: 2021-08-14 | Stop reason: HOSPADM

## 2021-08-12 RX ORDER — SODIUM CHLORIDE 9 MG/ML
INJECTION, SOLUTION INTRAVENOUS CONTINUOUS PRN
Status: COMPLETED | OUTPATIENT
Start: 2021-08-12 | End: 2021-08-12

## 2021-08-12 RX ORDER — CARVEDILOL 6.25 MG/1
6.25 TABLET ORAL ONCE
Status: COMPLETED | OUTPATIENT
Start: 2021-08-12 | End: 2021-08-12

## 2021-08-12 RX ORDER — SODIUM CHLORIDE 0.9 % (FLUSH) 0.9 %
3 SYRINGE (ML) INJECTION EVERY 12 HOURS SCHEDULED
Status: DISCONTINUED | OUTPATIENT
Start: 2021-08-12 | End: 2021-08-12 | Stop reason: HOSPADM

## 2021-08-12 RX ORDER — ONDANSETRON 4 MG/1
4 TABLET, FILM COATED ORAL EVERY 6 HOURS PRN
Status: DISCONTINUED | OUTPATIENT
Start: 2021-08-12 | End: 2021-08-14 | Stop reason: HOSPADM

## 2021-08-12 RX ORDER — HEPARIN SODIUM 10000 [USP'U]/100ML
12 INJECTION, SOLUTION INTRAVENOUS
Status: DISCONTINUED | OUTPATIENT
Start: 2021-08-12 | End: 2021-08-12

## 2021-08-12 RX ORDER — CITALOPRAM 20 MG/1
20 TABLET ORAL DAILY
Status: DISCONTINUED | OUTPATIENT
Start: 2021-08-12 | End: 2021-08-14 | Stop reason: HOSPADM

## 2021-08-12 RX ORDER — SODIUM CHLORIDE 0.9 % (FLUSH) 0.9 %
10 SYRINGE (ML) INJECTION AS NEEDED
Status: DISCONTINUED | OUTPATIENT
Start: 2021-08-12 | End: 2021-08-12 | Stop reason: HOSPADM

## 2021-08-12 RX ORDER — ATORVASTATIN CALCIUM 40 MG/1
80 TABLET, FILM COATED ORAL NIGHTLY
Status: DISCONTINUED | OUTPATIENT
Start: 2021-08-12 | End: 2021-08-14 | Stop reason: HOSPADM

## 2021-08-12 RX ORDER — DIAZEPAM 5 MG/1
10 TABLET ORAL ONCE
Status: DISCONTINUED | OUTPATIENT
Start: 2021-08-12 | End: 2021-08-12 | Stop reason: HOSPADM

## 2021-08-12 RX ADMIN — CARVEDILOL 6.25 MG: 6.25 TABLET, FILM COATED ORAL at 15:58

## 2021-08-12 RX ADMIN — TICAGRELOR 180 MG: 90 TABLET ORAL at 11:48

## 2021-08-12 RX ADMIN — CITALOPRAM HYDROBROMIDE 20 MG: 20 TABLET ORAL at 09:15

## 2021-08-12 RX ADMIN — FAMOTIDINE 20 MG: 10 INJECTION INTRAVENOUS at 11:21

## 2021-08-12 RX ADMIN — HEPARIN SODIUM 12 UNITS/KG/HR: 10000 INJECTION, SOLUTION INTRAVENOUS at 11:37

## 2021-08-12 RX ADMIN — ALUMINUM HYDROXIDE, MAGNESIUM HYDROXIDE, AND DIMETHICONE 15 ML: 400; 400; 40 SUSPENSION ORAL at 09:15

## 2021-08-12 RX ADMIN — CARVEDILOL 6.25 MG: 6.25 TABLET, FILM COATED ORAL at 11:21

## 2021-08-12 RX ADMIN — ONDANSETRON 4 MG: 2 INJECTION INTRAMUSCULAR; INTRAVENOUS at 19:04

## 2021-08-12 RX ADMIN — ATORVASTATIN CALCIUM 80 MG: 40 TABLET, FILM COATED ORAL at 21:01

## 2021-08-12 RX ADMIN — FAMOTIDINE 20 MG: 10 INJECTION INTRAVENOUS at 21:00

## 2021-08-12 RX ADMIN — SODIUM CHLORIDE, PRESERVATIVE FREE 10 ML: 5 INJECTION INTRAVENOUS at 09:15

## 2021-08-12 RX ADMIN — LOSARTAN POTASSIUM 50 MG: 50 TABLET, FILM COATED ORAL at 09:15

## 2021-08-12 RX ADMIN — CARVEDILOL 6.25 MG: 6.25 TABLET, FILM COATED ORAL at 21:02

## 2021-08-12 RX ADMIN — MORPHINE SULFATE 2 MG: 2 INJECTION, SOLUTION INTRAMUSCULAR; INTRAVENOUS at 19:50

## 2021-08-12 RX ADMIN — ASPIRIN 81 MG: 81 TABLET, COATED ORAL at 09:15

## 2021-08-12 RX ADMIN — TICAGRELOR 90 MG: 90 TABLET ORAL at 21:02

## 2021-08-12 NOTE — CONSULTS
[facility]  Initial Inpatient Consult Note    Referring Provider: Hospitalist    Reason for Consultation: Chest pain    Subjective     History of present illness:    61 y.o. female admitted yesterday with complaints of substernal chest pain with radiation to the upper chest bilaterally and epigastric discomfort.  The patient was admitted for cardiac rule out.  Her symptoms predominantly were better when I saw the patient earlier today although she did have another episode of chest pain this morning.  She denies a history of acid reflux consistently although has had some mild dyspepsia over the past couple of weeks.  She denies any dysphagia.  She periodically takes Tums and Rolaids few times per week.  She denies any weight loss.  Of note her  has a long cardiac history which has been stressful for her.  She describes a remote EGD    Past Medical History:  Past Medical History:   Diagnosis Date   • Anxiety    • Benign essential tremor    • Depression    • Hypertension      Past Surgical History:  Past Surgical History:   Procedure Laterality Date   •  SECTION     • CHOLECYSTECTOMY     • HYSTERECTOMY        Social History:   Social History     Tobacco Use   • Smoking status: Never Smoker   • Smokeless tobacco: Never Used   Substance Use Topics   • Alcohol use: Never      Family History:  Family History   Problem Relation Age of Onset   • Coronary artery disease Mother    • Coronary artery disease Father        Home Meds:  Medications Prior to Admission   Medication Sig Dispense Refill Last Dose   • citalopram (CeleXA) 20 MG tablet Take 20 mg by mouth Daily.   8/10/2021 at Unknown time   • irbesartan (AVAPRO) 300 MG tablet Take 300 mg by mouth Every Night.   8/10/2021 at Unknown time   • pravastatin (PRAVACHOL) 20 MG tablet Take 20 mg by mouth Daily.   8/10/2021 at Unknown time     Current Meds:   [MAR Hold] aspirin, 81 mg, Oral, Daily  [MAR Hold] atorvastatin, 40 mg, Oral,  Once  carvedilol, 6.25 mg, Oral, BID With Meals  carvedilol, 6.25 mg, Oral, Once  [MAR Hold] citalopram, 20 mg, Oral, Daily  diazePAM, 10 mg, Oral, Once  famotidine, 20 mg, Intravenous, Q12H  losartan, 50 mg, Oral, Q24H  [MAR Hold] senna-docusate sodium, 2 tablet, Oral, BID  [MAR Hold] sodium chloride, 10 mL, Intravenous, Q12H  sodium chloride, 3 mL, Intravenous, Q12H      Allergies:  Allergies   Allergen Reactions   • Eggs Or Egg-Derived Products Anaphylaxis   • Erythromycin Unknown - High Severity     Review of Systems  Pertinent items are noted in HPI, all other systems reviewed and negative         Vital Signs  Temp:  [97.3 °F (36.3 °C)-98.1 °F (36.7 °C)] 97.3 °F (36.3 °C)  Heart Rate:  [77-92] 90  Resp:  [16-20] 20  BP: (128-159)/(63-86) 137/77  Physical Exam:  General Appearance:    Alert, cooperative, in no acute distress   Head:    Normocephalic, without obvious abnormality, atraumatic   Eyes:          conjunctivae and sclerae normal, no   icterus   Throat:   no thrush, oral mucosa moist   Neck:   Supple, no adenopathy   Lungs:     Clear to auscultation bilaterally    Heart:    Regular rhythm and normal rate    Chest Wall:    No abnormalities observed   Abdomen:     Soft, nondistended, nontender; normal bowel sounds   Extremities:   no edema, no redness   Skin:   No bruising or rash   Psychiatric:  normal mood and insight     Results Review:  [x]  Laboratory   [x]  Radiology  []  Pathology      I reviewed the patient's new clinical results.    Results from last 7 days   Lab Units 08/12/21  1143 08/11/21  1557   WBC 10*3/mm3 8.06 6.87   HEMOGLOBIN g/dL 14.3 14.7   HEMATOCRIT % 41.2 42.6   PLATELETS 10*3/mm3 238 242     Results from last 7 days   Lab Units 08/12/21  1143 08/11/21  1609 08/11/21  1557   SODIUM mmol/L 138  --  139   POTASSIUM mmol/L 4.0  --  3.6   CHLORIDE mmol/L 105  --  103   CO2 mmol/L 20.5*  --  19.7*   BUN mg/dL 11  --  13   CREATININE mg/dL 0.92 1.00 1.05*   CALCIUM mg/dL 9.1  --  9.5    BILIRUBIN mg/dL  --   --  0.4   ALK PHOS U/L  --   --  112   ALT (SGPT) U/L  --   --  17   AST (SGOT) U/L  --   --  17   GLUCOSE mg/dL 141*  --  177*     Results from last 7 days   Lab Units 08/12/21  1143 08/11/21  1557   INR  0.96* 0.93*     No results found for: LIPASE    Radiology:  XR Chest 1 View   Final Result           Assessment/Plan     Patient Active Problem List   Diagnosis   • Chest pain in adult   • Abdominal pain   • Hyperglycemia   • NSTEMI, initial episode of care (CMS/[facility])        Plan:  The patient was admitted for chest pain which is atypical in nature however she had troponin elevated with her most recent labs.  We will therefore defer endoscopic evaluation as she does not have a long history of reflux symptoms or dysphagia.  Once her cardiac evaluation has been completed and treated she continues to have reflux symptoms thereafter we can consider upper endoscopy.      I discussed the patients findings and my recommendations with patient.    Bimal Richardson MD

## 2021-08-12 NOTE — PLAN OF CARE
Problem: Adult Inpatient Plan of Care  Goal: Plan of Care Review  8/12/2021 0620 by Adriana Carolina RN  Outcome: Ongoing, Progressing  8/12/2021 0502 by Adriana Carolina RN  Outcome: Ongoing, Progressing  Goal: Patient-Specific Goal (Individualized)  8/12/2021 0620 by Adriana Carolina RN  Outcome: Ongoing, Progressing  8/12/2021 0502 by Adriana Carolina RN  Outcome: Ongoing, Progressing  Goal: Absence of Hospital-Acquired Illness or Injury  8/12/2021 0620 by Adriana Carolina RN  Outcome: Ongoing, Progressing  8/12/2021 0502 by Adriana Carolina RN  Outcome: Ongoing, Progressing  Intervention: Identify and Manage Fall Risk  Recent Flowsheet Documentation  Taken 8/12/2021 0404 by Adriana Carolina RN  Safety Promotion/Fall Prevention: safety round/check completed  Taken 8/12/2021 0245 by Adriana Carolina RN  Safety Promotion/Fall Prevention: safety round/check completed  Taken 8/11/2021 2353 by Adriana Carolina RN  Safety Promotion/Fall Prevention: safety round/check completed  Goal: Optimal Comfort and Wellbeing  8/12/2021 0620 by Adriana Carolina RN  Outcome: Ongoing, Progressing  8/12/2021 0502 by Adriana Carolina RN  Outcome: Ongoing, Progressing  Goal: Readiness for Transition of Care  8/12/2021 0620 by Adriana Carolina RN  Outcome: Ongoing, Progressing  8/12/2021 0502 by Adriana Carolina RN  Outcome: Ongoing, Progressing  Intervention: Mutually Develop Transition Plan  Recent Flowsheet Documentation  Taken 8/12/2021 0054 by Adriana Carolina RN  Equipment Currently Used at Home: none  Taken 8/11/2021 2312 by Adriana Carolina RN  Transportation Anticipated: car, drives self  Patient/Family Anticipated Services at Transition: none  Patient/Family Anticipates Transition to: home   Goal Outcome Evaluation:

## 2021-08-12 NOTE — PROGRESS NOTES
[facility]     Progress Note    Patient Name: Carley Deng  : 1960  MRN: [id number]  Primary Care Physician:  Chelsy Maynard PA-C  Date of admission: 2021    Subjective   Subjective     Chief Complaint: Chest pain    History of Present Illness  Patient Reports Mrs. Hardin is a 61 years old female was admitted yesterday with symptoms of chest pain and epigastric pain.  She had some mild chest pain again this morning.  She is turning out to be positive for non-ST elevation myocardial infarction    Review of Systems   Constitutional: Negative for fatigue and fever.   HENT: Negative for hearing loss and trouble swallowing.    Eyes: Negative for visual disturbance.   Respiratory: Positive for chest tightness and shortness of breath. Negative for cough and wheezing.    Cardiovascular: Negative for chest pain, palpitations and leg swelling.   Gastrointestinal: Positive for abdominal pain and nausea. Negative for abdominal distention and vomiting.   Genitourinary: Negative for dysuria.   Musculoskeletal: Negative for myalgias.   Skin: Negative for rash.   Neurological: Negative for dizziness, syncope, light-headedness and headaches.       Objective   Objective     Vitals:   Temp:  [97.3 °F (36.3 °C)-98.1 °F (36.7 °C)] 97.3 °F (36.3 °C)  Heart Rate:  [77-92] 90  Resp:  [16-20] 20  BP: (128-159)/(63-86) 137/77  Flow (L/min):  [2] 2    Physical Exam     Result Review    Result Review:  I have personally reviewed the results from the time of this admission to 2021 14:52 EDT and agree with these findings:  []  Laboratory  []  Microbiology  []  Radiology  []  EKG  []  Cardiology/Vascular   []  Pathology  []  Old records  []  Other:    WBC   Date Value Ref Range Status   2021 8.06 3.40 - 10.80 10*3/mm3 Final     RBC   Date Value Ref Range Status   2021 4.60 3.77 - 5.28 10*6/mm3 Final     Hemoglobin   Date Value Ref Range Status   2021 14.3 12.0 - 15.9 g/dL Final     Hematocrit   Date  Value Ref Range Status   08/12/2021 41.2 34.0 - 46.6 % Final     MCV   Date Value Ref Range Status   08/12/2021 89.6 79.0 - 97.0 fL Final     MCH   Date Value Ref Range Status   08/12/2021 31.1 26.6 - 33.0 pg Final     MCHC   Date Value Ref Range Status   08/12/2021 34.7 31.5 - 35.7 g/dL Final     RDW   Date Value Ref Range Status   08/12/2021 13.0 12.3 - 15.4 % Final     RDW-SD   Date Value Ref Range Status   08/12/2021 42.4 37.0 - 54.0 fl Final     MPV   Date Value Ref Range Status   08/12/2021 9.3 6.0 - 12.0 fL Final     Platelets   Date Value Ref Range Status   08/12/2021 238 140 - 450 10*3/mm3 Final     Neutrophil %   Date Value Ref Range Status   08/12/2021 79.7 (H) 42.7 - 76.0 % Final     Lymphocyte %   Date Value Ref Range Status   08/12/2021 11.9 (L) 19.6 - 45.3 % Final     Monocyte %   Date Value Ref Range Status   08/12/2021 6.7 5.0 - 12.0 % Final     Eosinophil %   Date Value Ref Range Status   08/12/2021 1.1 0.3 - 6.2 % Final     Basophil %   Date Value Ref Range Status   08/12/2021 0.4 0.0 - 1.5 % Final     Immature Grans %   Date Value Ref Range Status   08/12/2021 0.2 0.0 - 0.5 % Final     Neutrophils, Absolute   Date Value Ref Range Status   08/12/2021 6.42 1.70 - 7.00 10*3/mm3 Final     Lymphocytes, Absolute   Date Value Ref Range Status   08/12/2021 0.96 0.70 - 3.10 10*3/mm3 Final     Monocytes, Absolute   Date Value Ref Range Status   08/12/2021 0.54 0.10 - 0.90 10*3/mm3 Final     Eosinophils, Absolute   Date Value Ref Range Status   08/12/2021 0.09 0.00 - 0.40 10*3/mm3 Final     Basophils, Absolute   Date Value Ref Range Status   08/12/2021 0.03 0.00 - 0.20 10*3/mm3 Final     Immature Grans, Absolute   Date Value Ref Range Status   08/12/2021 0.02 0.00 - 0.05 10*3/mm3 Final     nRBC   Date Value Ref Range Status   08/12/2021 0.0 0.0 - 0.2 /100 WBC Final      Basic Metabolic Panel    Sodium Sodium   Date Value Ref Range Status   08/12/2021 138 136 - 145 mmol/L Final   08/11/2021 139 136 - 145  mmol/L Final      Potassium Potassium   Date Value Ref Range Status   08/12/2021 4.0 3.5 - 5.2 mmol/L Final   08/11/2021 3.6 3.5 - 5.2 mmol/L Final      Chloride Chloride   Date Value Ref Range Status   08/12/2021 105 98 - 107 mmol/L Final   08/11/2021 103 98 - 107 mmol/L Final      Bicarbonate No results found for: PLASMABICARB   BUN BUN   Date Value Ref Range Status   08/12/2021 11 8 - 23 mg/dL Final   08/11/2021 13 8 - 23 mg/dL Final      Creatinine Creatinine   Date Value Ref Range Status   08/12/2021 0.92 0.57 - 1.00 mg/dL Final   08/11/2021 1.00 mg/dL Final     Comment:     Serial Number: [id number]:  [id number]   08/11/2021 1.05 (H) 0.57 - 1.00 mg/dL Final      Calcium Calcium   Date Value Ref Range Status   08/12/2021 9.1 8.6 - 10.5 mg/dL Final   08/11/2021 9.5 8.6 - 10.5 mg/dL Final      Glucose      No components found for: GLUCOSE.*  Lab Results   Component Value Date    GLUCOSE 141 (H) 08/12/2021    BUN 11 08/12/2021    CREATININE 0.92 08/12/2021    EGFRIFNONA 62 08/12/2021    BCR 12.0 08/12/2021    K 4.0 08/12/2021    CO2 20.5 (L) 08/12/2021    CALCIUM 9.1 08/12/2021    ALBUMIN 4.30 08/11/2021    AST 17 08/11/2021    ALT 17 08/11/2021      CARDIAC LABS:      Lab 08/12/21  1143 08/12/21  0913 08/11/21  1557   TROPONIN T  --  0.780*  --    PROTIME 10.6  --  10.3   INR 0.96*  --  0.93*      TSH Results:      Lab 08/12/21  1304   TSH 1.960      CPK    Common Labsle 8/12/21   Creatine Kinase 407 (A)   (A) Abnormal value             No results found for this or any previous visit.   COVID19   Date Value Ref Range Status   08/11/2021 Not Detected Not Detected - Ref. Range Final      EKG: Sinus rhythm with anterolateral T wave inversions which different than yesterday's EKG.   chest X-ray   CT-scan of the chest             Telemetry reviewed. Normal Sinus Rhythm, No major cardiac arrhythmias within the last 24 hours.       Assessment/Plan   Assessment / Plan     Brief Patient Summary:  Carley Deng is a 61  y.o. female who has developed non-ST elevation myocardial infarction, anterior, less than 24 hours duration    Active Hospital Problems:  Active Hospital Problems    Diagnosis    • **NSTEMI, initial episode of care (CMS/[facility])      Added automatically from request for surgery [id number]     • Abdominal pain    • Hyperglycemia    • Chest pain in adult      Plan: Proceed with diagnostic cardiac catheterization and possible PCI.  Indications and risk of the procedure have been explained to the patient  Will DC the heparin on-call.    DVT prophylaxis:  Medical and mechanical DVT prophylaxis orders are present.    CODE STATUS:    Code Status: CPR  Medical Interventions (Level of Support Prior to Arrest): Full    Disposition:  I expect patient to be discharged inpatient.    Electronically signed by Dc Mckeon MD, 08/12/21, 2:52 PM EDT.

## 2021-08-12 NOTE — H&P
[facility]   HISTORY AND PHYSICAL    Patient Name: Carley Deng  : 1960  MRN: [id number]  Primary Care Physician:  Chelsy Maynard PA-C  Date of admission: 2021    Subjective   Subjective     Chief Complaint:   Chest pain    HPI:    Carley Deng is a 61 y.o. female who reported to ER last night for chest pain.  Patient was brought into ER by paramedics for chest discomfort.  Patient has on and off episodes for couple of weeks.  Has been tested for Covid outside twice and negative.  Patient reports chest pain and abdominal discomfort radiating to both lungs and goes into both shoulders.  She was evaluated by ER physician Dr. Davis who called cardiologist on-call Dr. Herrera, Dr. Herrera saw patient, recommended admission to hospital to rule out MI./  According to patient he was planning to do a cardiac cath/  Patient denies any chest pain now she complains of some abdominal discomfort goes up in her chest.  Feels bloating.  Some associated nausea.  No black-colored stools.    Review of Systems   Constitutional: Negative for fatigue and fever.   Respiratory: Negative for cough and shortness of breath.    Cardiovascular: Positive for chest pain. Negative for leg swelling.   Gastrointestinal: Positive for abdominal distention, abdominal pain and blood in stool. Negative for nausea.   Genitourinary: Negative.  Negative for dysuria and flank pain.   Musculoskeletal: Positive for myalgias.   Neurological: Negative for headaches.   Psychiatric/Behavioral: Negative.    :    Personal History     Past Medical History:   Diagnosis Date   • Anxiety    • Benign essential tremor    • Depression    • Hypertension        Past Surgical History:   Procedure Laterality Date   •  SECTION     • CHOLECYSTECTOMY     • HYSTERECTOMY         Family History: family history includes Coronary artery disease in her father and mother. Otherwise pertinent FHx was reviewed and not pertinent to current issue.    Social  History:  reports that she has never smoked. She has never used smokeless tobacco. She reports that she does not drink alcohol and does not use drugs.    Home Medications:  citalopram, irbesartan, and pravastatin      Allergies:  Allergies   Allergen Reactions   • Eggs Or Egg-Derived Products Anaphylaxis   • Erythromycin Unknown - High Severity       Objective   Objective     Vitals:   Temp:  [97.7 °F (36.5 °C)-98.1 °F (36.7 °C)] 97.7 °F (36.5 °C)  Heart Rate:  [77-92] 82  Resp:  [16-20] 20  BP: (128-159)/(63-86) 145/80  Physical Exam     Elderly female, obese, anxious not in acute distress.  HEENT without any pallor  Heart is regular lungs are clear.  Abdomen is soft, epigastric tenderness.  No guarding no rebound.  Extremities no edema.  Neurologically awake alert and oriented    Result Review    Result Review:  I have personally reviewed the results from the time of this admission to 8/12/2021 08:35 EDT and agree with these findings:  [x]  Laboratory  []  Microbiology  [x]  Radiology  []  EKG/Telemetry   []  Cardiology/Vascular   []  Pathology  []  Old records  []  Other:    CBC:    WBC   Date Value Ref Range Status   08/11/2021 6.87 3.40 - 10.80 10*3/mm3 Final     RBC   Date Value Ref Range Status   08/11/2021 4.71 3.77 - 5.28 10*6/mm3 Final     Hemoglobin   Date Value Ref Range Status   08/11/2021 14.7 12.0 - 15.9 g/dL Final     Hematocrit   Date Value Ref Range Status   08/11/2021 42.6 34.0 - 46.6 % Final     MCV   Date Value Ref Range Status   08/11/2021 90.4 79.0 - 97.0 fL Final     MCH   Date Value Ref Range Status   08/11/2021 31.2 26.6 - 33.0 pg Final     MCHC   Date Value Ref Range Status   08/11/2021 34.5 31.5 - 35.7 g/dL Final     RDW   Date Value Ref Range Status   08/11/2021 12.8 12.3 - 15.4 % Final     RDW-SD   Date Value Ref Range Status   08/11/2021 42.2 37.0 - 54.0 fl Final     MPV   Date Value Ref Range Status   08/11/2021 9.3 6.0 - 12.0 fL Final     Platelets   Date Value Ref Range Status    08/11/2021 242 140 - 450 10*3/mm3 Final     Neutrophil %   Date Value Ref Range Status   08/11/2021 72.2 42.7 - 76.0 % Final     Lymphocyte %   Date Value Ref Range Status   08/11/2021 18.9 (L) 19.6 - 45.3 % Final     Monocyte %   Date Value Ref Range Status   08/11/2021 6.7 5.0 - 12.0 % Final     Eosinophil %   Date Value Ref Range Status   08/11/2021 1.2 0.3 - 6.2 % Final     Basophil %   Date Value Ref Range Status   08/11/2021 0.6 0.0 - 1.5 % Final     Immature Grans %   Date Value Ref Range Status   08/11/2021 0.4 0.0 - 0.5 % Final     Neutrophils, Absolute   Date Value Ref Range Status   08/11/2021 4.96 1.70 - 7.00 10*3/mm3 Final     Lymphocytes, Absolute   Date Value Ref Range Status   08/11/2021 1.30 0.70 - 3.10 10*3/mm3 Final     Monocytes, Absolute   Date Value Ref Range Status   08/11/2021 0.46 0.10 - 0.90 10*3/mm3 Final     Eosinophils, Absolute   Date Value Ref Range Status   08/11/2021 0.08 0.00 - 0.40 10*3/mm3 Final     Basophils, Absolute   Date Value Ref Range Status   08/11/2021 0.04 0.00 - 0.20 10*3/mm3 Final     Immature Grans, Absolute   Date Value Ref Range Status   08/11/2021 0.03 0.00 - 0.05 10*3/mm3 Final     nRBC   Date Value Ref Range Status   08/11/2021 0.0 0.0 - 0.2 /100 WBC Final        BMP:    Lab Results   Component Value Date    GLUCOSE 177 (H) 08/11/2021    BUN 13 08/11/2021    CREATININE 1.00 08/11/2021    EGFRIFNONA 53 (L) 08/11/2021    BCR 12.4 08/11/2021    K 3.6 08/11/2021    CO2 19.7 (L) 08/11/2021    CALCIUM 9.5 08/11/2021    ALBUMIN 4.30 08/11/2021    AST 17 08/11/2021    ALT 17 08/11/2021        No radiology results for the last day           Assessment/Plan   Assessment / Plan       Current Diagnosis:  Active Hospital Problems    Diagnosis    • Abdominal pain    • Hyperglycemia    • Chest pain in adult      Plan:   Admit to hospital to rule out MI per ER physician and cardiologist recommendations.  Check serial cardiac enzymes.  Patient seen by cardiologist and plan  further work-up.\  (Consult note not available at this time).  Patient also complaining of abdominal pain most likely her symptoms are GI in origin.  I will consult gastroenterologist, Dr. Richardson notified.  Start Pepcid IV.  Antireflux measures discussed.  Noted on labs patient has hyperglycemia we will check an A1c.  Further management will be based on clinical course, lab results and consultant input      DVT prophylaxis:  Medical and mechanical DVT prophylaxis orders are present.    GI Prophylaxis:    Pepcid    CODE STATUS:    Code Status: CPR  Medical Interventions (Level of Support Prior to Arrest): Full    Admission Status:  I believe this patient meets observation status.             I have dictated this note utilizing Dragon Dictation.              Please note that portions of this note were completed with a voice recognition program.             Part of this note may be an electronic transcription/translation of spoken language to printed text         using the Dragon Dictation System.       Electronically signed by Karan Billy MD, 08/12/21, 8:27 AM EDT.    Total time spent with in evaluation and management: 35 minutes

## 2021-08-12 NOTE — PLAN OF CARE
Goal Outcome Evaluation:  Plan of Care Reviewed With: patient        Progress: improving  Outcome Summary: Patient had cardiac cath today with stent placement, transferred to PCU

## 2021-08-12 NOTE — PLAN OF CARE
Goal Outcome Evaluation:   Pt received from cath lab, post stent placement to LAD.    Pt continues with cp and shoulder pain.

## 2021-08-13 LAB
ANION GAP SERPL CALCULATED.3IONS-SCNC: 11.1 MMOL/L (ref 5–15)
BASOPHILS # BLD AUTO: 0.04 10*3/MM3 (ref 0–0.2)
BASOPHILS NFR BLD AUTO: 0.4 % (ref 0–1.5)
BUN SERPL-MCNC: 13 MG/DL (ref 8–23)
BUN/CREAT SERPL: 12.3 (ref 7–25)
CALCIUM SPEC-SCNC: 8.8 MG/DL (ref 8.6–10.5)
CHLORIDE SERPL-SCNC: 104 MMOL/L (ref 98–107)
CO2 SERPL-SCNC: 19.9 MMOL/L (ref 22–29)
CREAT SERPL-MCNC: 1.06 MG/DL (ref 0.57–1)
DEPRECATED RDW RBC AUTO: 44.8 FL (ref 37–54)
EOSINOPHIL # BLD AUTO: 0.07 10*3/MM3 (ref 0–0.4)
EOSINOPHIL NFR BLD AUTO: 0.7 % (ref 0.3–6.2)
ERYTHROCYTE [DISTWIDTH] IN BLOOD BY AUTOMATED COUNT: 13.3 % (ref 12.3–15.4)
GFR SERPL CREATININE-BSD FRML MDRD: 52 ML/MIN/1.73
GLUCOSE SERPL-MCNC: 136 MG/DL (ref 65–99)
HCT VFR BLD AUTO: 39.7 % (ref 34–46.6)
HGB BLD-MCNC: 13.2 G/DL (ref 12–15.9)
IMM GRANULOCYTES # BLD AUTO: 0.04 10*3/MM3 (ref 0–0.05)
IMM GRANULOCYTES NFR BLD AUTO: 0.4 % (ref 0–0.5)
LYMPHOCYTES # BLD AUTO: 0.69 10*3/MM3 (ref 0.7–3.1)
LYMPHOCYTES NFR BLD AUTO: 6.9 % (ref 19.6–45.3)
MCH RBC QN AUTO: 30.8 PG (ref 26.6–33)
MCHC RBC AUTO-ENTMCNC: 33.2 G/DL (ref 31.5–35.7)
MCV RBC AUTO: 92.5 FL (ref 79–97)
MONOCYTES # BLD AUTO: 0.6 10*3/MM3 (ref 0.1–0.9)
MONOCYTES NFR BLD AUTO: 6 % (ref 5–12)
NEUTROPHILS NFR BLD AUTO: 8.6 10*3/MM3 (ref 1.7–7)
NEUTROPHILS NFR BLD AUTO: 85.6 % (ref 42.7–76)
NRBC BLD AUTO-RTO: 0 /100 WBC (ref 0–0.2)
PLATELET # BLD AUTO: 217 10*3/MM3 (ref 140–450)
PMV BLD AUTO: 9.4 FL (ref 6–12)
POTASSIUM SERPL-SCNC: 3.9 MMOL/L (ref 3.5–5.2)
QT INTERVAL: 479 MS
QT INTERVAL: 487 MS
RBC # BLD AUTO: 4.29 10*6/MM3 (ref 3.77–5.28)
SODIUM SERPL-SCNC: 135 MMOL/L (ref 136–145)
TROPONIN T SERPL-MCNC: 0.63 NG/ML (ref 0–0.03)
WBC # BLD AUTO: 10.04 10*3/MM3 (ref 3.4–10.8)

## 2021-08-13 PROCEDURE — 84484 ASSAY OF TROPONIN QUANT: CPT | Performed by: INTERNAL MEDICINE

## 2021-08-13 PROCEDURE — 80048 BASIC METABOLIC PNL TOTAL CA: CPT | Performed by: INTERNAL MEDICINE

## 2021-08-13 PROCEDURE — 99232 SBSQ HOSP IP/OBS MODERATE 35: CPT | Performed by: INTERNAL MEDICINE

## 2021-08-13 PROCEDURE — 93005 ELECTROCARDIOGRAM TRACING: CPT | Performed by: INTERNAL MEDICINE

## 2021-08-13 PROCEDURE — 85025 COMPLETE CBC W/AUTO DIFF WBC: CPT | Performed by: INTERNAL MEDICINE

## 2021-08-13 RX ADMIN — FAMOTIDINE 20 MG: 10 INJECTION INTRAVENOUS at 08:59

## 2021-08-13 RX ADMIN — ALPRAZOLAM 0.5 MG: 0.25 TABLET ORAL at 04:33

## 2021-08-13 RX ADMIN — SODIUM CHLORIDE, PRESERVATIVE FREE 10 ML: 5 INJECTION INTRAVENOUS at 21:03

## 2021-08-13 RX ADMIN — ALPRAZOLAM 0.5 MG: 0.25 TABLET ORAL at 21:03

## 2021-08-13 RX ADMIN — ATORVASTATIN CALCIUM 80 MG: 40 TABLET, FILM COATED ORAL at 21:02

## 2021-08-13 RX ADMIN — CARVEDILOL 6.25 MG: 6.25 TABLET, FILM COATED ORAL at 17:17

## 2021-08-13 RX ADMIN — TICAGRELOR 90 MG: 90 TABLET ORAL at 21:02

## 2021-08-13 RX ADMIN — FAMOTIDINE 20 MG: 10 INJECTION INTRAVENOUS at 21:03

## 2021-08-13 RX ADMIN — SODIUM CHLORIDE, PRESERVATIVE FREE 10 ML: 5 INJECTION INTRAVENOUS at 08:59

## 2021-08-13 RX ADMIN — CITALOPRAM HYDROBROMIDE 20 MG: 20 TABLET ORAL at 09:50

## 2021-08-13 RX ADMIN — TICAGRELOR 90 MG: 90 TABLET ORAL at 09:00

## 2021-08-13 RX ADMIN — LOSARTAN POTASSIUM 50 MG: 50 TABLET, FILM COATED ORAL at 09:00

## 2021-08-13 RX ADMIN — ASPIRIN 81 MG: 81 TABLET, COATED ORAL at 09:00

## 2021-08-13 RX ADMIN — CARVEDILOL 6.25 MG: 6.25 TABLET, FILM COATED ORAL at 09:00

## 2021-08-13 NOTE — CONSULTS
Pt has had NSTEMI, with [facility] with PCI to MID LAD.    Pt has commercial insurance, brilinta copay card placed in pt chart, along with stent card.     Pt has been educated on importance of medication compliance and possible sided effects to share with MD if occurred. Pt and family member at bedside verbalized understanding.     CM will continue to f/u

## 2021-08-13 NOTE — NURSING NOTE
Cardiac Rehab Phase I - Occurrence #1    Education Topics:  Cardiac Rehab yes   No Phase I need assessed no     Topic Components:  Exercise yes     Teaching Aids:  Phase 2 Brochure yes     Teaching Method:  Written Instruction yes     Teaching Recipient:  Patient yes       Recovery/Discharge Instructions:  Cardiac Rehab Phase 2 yes     Cardiac Rehab Phase I Comm   Phase 2 cardiac rehab information mailed to patient.

## 2021-08-13 NOTE — PROGRESS NOTES
[facility]     Progress Note    Patient Name: Carley Deng  : 1960  MRN: [id number]  Primary Care Physician:  Chelsy Maynard PA-C  Date of admission: 2021    Subjective   Subjective     HPI:  Mrs. Deng reports significant anxiety this morning, but no recurrent chest pain/pressure, palpitations, nausea, or lightheadedness.  Her telemetry monitor shows sinus rhythm with a HR in the 60s-70s this morning.    Review of Systems  Negative except as per HPI    Objective   Objective     Vitals:   Temp:  [97.3 °F (36.3 °C)-98.1 °F (36.7 °C)] 97.5 °F (36.4 °C)  Heart Rate:  [63-90] 74  Resp:  [16-20] 16  BP: ()/(50-84) 107/50  Flow (L/min):  [2] 2    Physical Exam  General: alert and oriented, no distress  Neck: supple, no JVD, no carotid bruit  Chest: clear to auscultation bilaterally, no tachypnea, no rales or wheezing  CV: regular rate and rhythm, normal S1 and S2, no S3 or gallop, no murmur  Abdomen: soft, obese, non-distended, non-tender, + bowel sounds  Extremities: no cyanosis or edema, no hematoma/swelling at right radial cath access site    Current Medications:   •  acetaminophen  •  ALPRAZolam  •  aluminum-magnesium hydroxide-simethicone  •  aspirin  •  atorvastatin  •  senna-docusate sodium **AND** polyethylene glycol **AND** bisacodyl **AND** bisacodyl  •  carvedilol  •  citalopram  •  famotidine  •  HYDROcodone-acetaminophen  •  HYDROmorphone **AND** naloxone  •  losartan  •  magnesium hydroxide  •  Morphine **AND** naloxone  •  nitroglycerin  •  ondansetron **OR** ondansetron  •  Pharmacy to Dose enoxaparin (LOVENOX)  •  sodium chloride  •  sodium chloride  •  sodium chloride  •  sodium chloride  •  temazepam  •  ticagrelor     Result Review    Result Review:  I have personally reviewed the results from the time of this admission to 2021 11:06 EDT and agree with these findings:  [x]  Laboratory  []  Microbiology  [x]  Radiology  [x]  EKG/Telemetry   [x]  Cardiology/Vascular   []   Pathology  []  Old records  []  Other:  Most notable findings include: Troponin decreased to 0.632 today, Cr stable at 1.06 today     Assessment/Plan   Assessment / Plan     Brief Patient Summary:  Carley Deng is a 61 y.o. female with:  -NSTEMI, s/p urgent PCI to culprit mid LAD  -Coronary artery disease  -Essential hypertension  -Obesity, BMI=36.5  -Hyperlipidemia, LDL goal <70  -Borderline type II diabetes mellitus, A1C this admission = 6.07  -Anxiety    Active Hospital Problems:  Active Hospital Problems    Diagnosis    • **NSTEMI, initial episode of care (CMS/[facility])      Added automatically from request for surgery [id number]     • Abdominal pain    • Hyperglycemia    • Chest pain in adult        Plan:   -Continue DAPT with aspirin and ticagrelor for a minimum of one year.  Continue high-intensity statin therapy with atorvastatin 80 mg daily.  Will plan to repeat a FLP in 6-8 weeks.  Continue low-dose Coreg and she may resume her chronic dose of irbesartan at discharge.    -Will arrange outpatient cardiac rehab and plan for an outpatient echo in 6-8 weeks to reassess her global LV function post revascularization  -Significant weight loss was again recommended   -Management of borderline DM as per hospitalist  -No objection to discharge home today from a cardiovascular perspective; will sign off  -Follow up in Cardiology Clinic in 6 weeks       DVT prophylaxis:  Medical and mechanical DVT prophylaxis orders are present.    CODE STATUS:    Code Status: CPR  Medical Interventions (Level of Support Prior to Arrest): Full      Electronically signed by Humza Herrera MD, 08/13/21, 11:06 AM EDT.

## 2021-08-13 NOTE — PLAN OF CARE
Goal Outcome Evaluation:      Pt had TR band in place, no issues. No hematoma. Vitals signs stable. Pain is improving.

## 2021-08-13 NOTE — PLAN OF CARE
Goal Outcome Evaluation:  Some shoulder pain.  Anxiety better.  Anticipate dc in am.

## 2021-08-13 NOTE — PROGRESS NOTES
[facility]     Progress Note    Patient Name: Carley Deng  : 1960  MRN: [id number]  Primary Care Physician:  Chelsy Maynadr PA-C  Date of admission: 2021      Subjective   Brief summary.  Patient admitted with chest pain yesterday.  Positive for acute MI and taken to cardiac cath and post stent      HPI:  Feels weak.  No chest pain but some shortness of breath last night.  Very weak.  Anxious.      Review of Systems     No chest pain or shortness of breath now.  No fever chills.  Denies any nausea      Objective     Vitals:   Temp:  [97.3 °F (36.3 °C)-97.7 °F (36.5 °C)] 97.7 °F (36.5 °C)  Heart Rate:  [63-81] 81  Resp:  [16-18] 16  BP: (106-117)/(50-78) 116/59    Physical Exam :   Elderly obese female not in acute distress.  Neck supple.  Heart regular.  Lungs clear.  Abdomen obese and soft mild epigastric tenderness no guarding no rebound.  Extremities no edema    Result Review      Result Review:  I have personally reviewed the results from the time of this admission to 2021 19:27 EDT and agree with these findings:  [x]  Laboratory  []  Microbiology  []  Radiology  []  EKG/Telemetry   []  Cardiology/Vascular   []  Pathology  []  Old records  []  Other:           Assessment / Plan       Active Hospital Problems:  Active Hospital Problems    Diagnosis    • **NSTEMI, initial episode of care (CMS/[facility])      Added automatically from request for surgery [id number]     • Abdominal pain    • Hyperglycemia    • Chest pain in adult      Plan:   Stable post stenting.  Discussed with Dr. Mckeon  .  Due to extensive lesion and stenting prefers to monitor her for 1 more day  .  Also patient has some episodes of chest pain and shortness of breath last night we will continue to monitor.  Check labs.  Increase activity as tolerates.  Possible discharge over the weekend if remains stable       DVT prophylaxis:  Medical and mechanical DVT prophylaxis orders are present.    CODE STATUS:   Code Status:  CPR  Medical Interventions (Level of Support Prior to Arrest): Full            Electronically signed by Karan Billy MD, 08/13/21, 7:27 PM EDT.

## 2021-08-13 NOTE — CASE MANAGEMENT/SOCIAL WORK
Discharge Planning Assessment   [facility]     Patient Name: Carley Deng  MRN: [id number]  Today's Date: 8/13/2021    Admit Date: 8/11/2021    Discharge Needs Assessment     Row Name 08/13/21 0819       Living Environment    Lives With  spouse    Name(s) of Who Lives With Patient  Igor    Current Living Arrangements  home/apartment/condo    Primary Care Provided by  self    Provides Primary Care For  spouse    Family Caregiver if Needed  child(francia), adult    Quality of Family Relationships  helpful;involved;supportive    Able to Return to Prior Arrangements  yes       Transition Planning    Patient/Family Anticipates Transition to  home    Transportation Anticipated  car, drives self;family or friend will provide       Discharge Needs Assessment    Readmission Within the Last 30 Days  no previous admission in last 30 days    Equipment Currently Used at Home  none    Concerns to be Addressed  discharge planning;medication    Equipment Needed After Discharge  none        Discharge Plan     Row Name 08/13/21 0820       Plan    Plan  CM met with pt at home to assess needs. Pt alert and orient, states she lives with disabled  and provides care for him. Pt states she is independent, transports self, does not use DME at home. Pt will dc home once medically stable. Demographics, contacts, pharmacy and PCP reviewed. Discussed Brilinta with RN FERNANDO Don.  CM will continue to follow for discharge planning.    Patient/Family in Agreement with Plan  yes        Continued Care and Services - Admitted Since 8/11/2021    Coordination has not been started for this encounter.         Demographic Summary     Row Name 08/13/21 0817       General Information    Admission Type  inpatient    Arrived From  emergency department    Referral Source  admission list    Reason for Consult  discharge planning    Preferred Language  English     Used During This Interaction  no       Contact Information    Permission Granted to  Share Info With  ;family/designee        Functional Status     Row Name 08/13/21 0818       Functional Status    Usual Activity Tolerance  good    Current Activity Tolerance  good       Functional Status, IADL    Medications  independent    Meal Preparation  independent    Housekeeping  independent    Laundry  independent    Shopping  independent       Mental Status    General Appearance WDL  WDL       Mental Status Summary    Recent Changes in Mental Status/Cognitive Functioning  no changes        Psychosocial    No documentation.       Abuse/Neglect    No documentation.       Legal    No documentation.       Substance Abuse    No documentation.       Patient Forms    No documentation.           Karoline Henderson RN

## 2021-08-14 VITALS
HEIGHT: 65 IN | BODY MASS INDEX: 36.51 KG/M2 | TEMPERATURE: 98.2 F | RESPIRATION RATE: 16 BRPM | HEART RATE: 77 BPM | OXYGEN SATURATION: 95 % | DIASTOLIC BLOOD PRESSURE: 62 MMHG | SYSTOLIC BLOOD PRESSURE: 116 MMHG | WEIGHT: 219.14 LBS

## 2021-08-14 PROBLEM — R10.9 ABDOMINAL PAIN: Chronic | Status: RESOLVED | Noted: 2021-08-12 | Resolved: 2021-08-14

## 2021-08-14 PROBLEM — R07.9 CHEST PAIN IN ADULT: Status: RESOLVED | Noted: 2021-08-11 | Resolved: 2021-08-14

## 2021-08-14 PROBLEM — I21.4 NSTEMI, INITIAL EPISODE OF CARE: Status: RESOLVED | Noted: 2021-08-11 | Resolved: 2021-08-14

## 2021-08-14 PROCEDURE — 99239 HOSP IP/OBS DSCHRG MGMT >30: CPT | Performed by: INTERNAL MEDICINE

## 2021-08-14 RX ORDER — ATORVASTATIN CALCIUM 80 MG/1
80 TABLET, FILM COATED ORAL NIGHTLY
Qty: 30 TABLET | Refills: 0 | Status: SHIPPED | OUTPATIENT
Start: 2021-08-14 | End: 2022-05-02 | Stop reason: ALTCHOICE

## 2021-08-14 RX ORDER — FAMOTIDINE 20 MG/1
20 TABLET, FILM COATED ORAL NIGHTLY
Status: SHIPPED | OUTPATIENT
Start: 2021-08-14 | End: 2021-09-13

## 2021-08-14 RX ORDER — ASPIRIN 81 MG/1
81 TABLET ORAL DAILY
Qty: 30 TABLET | Refills: 0 | Status: SHIPPED | OUTPATIENT
Start: 2021-08-15 | End: 2021-09-14

## 2021-08-14 RX ORDER — LOPERAMIDE HYDROCHLORIDE 2 MG/1
2 CAPSULE ORAL 4 TIMES DAILY PRN
Status: DISCONTINUED | OUTPATIENT
Start: 2021-08-14 | End: 2021-08-14 | Stop reason: HOSPADM

## 2021-08-14 RX ORDER — CARVEDILOL 6.25 MG/1
6.25 TABLET ORAL 2 TIMES DAILY WITH MEALS
Qty: 60 TABLET | Refills: 0 | Status: SHIPPED | OUTPATIENT
Start: 2021-08-14 | End: 2021-09-13 | Stop reason: SDUPTHER

## 2021-08-14 RX ADMIN — SODIUM CHLORIDE, PRESERVATIVE FREE 10 ML: 5 INJECTION INTRAVENOUS at 08:39

## 2021-08-14 RX ADMIN — TICAGRELOR 90 MG: 90 TABLET ORAL at 08:39

## 2021-08-14 RX ADMIN — CITALOPRAM HYDROBROMIDE 20 MG: 20 TABLET ORAL at 08:38

## 2021-08-14 RX ADMIN — LOSARTAN POTASSIUM 50 MG: 50 TABLET, FILM COATED ORAL at 08:38

## 2021-08-14 RX ADMIN — LOPERAMIDE HYDROCHLORIDE 2 MG: 2 CAPSULE ORAL at 12:15

## 2021-08-14 RX ADMIN — ASPIRIN 81 MG: 81 TABLET, COATED ORAL at 08:38

## 2021-08-14 RX ADMIN — ACETAMINOPHEN 650 MG: 325 TABLET ORAL at 08:39

## 2021-08-14 RX ADMIN — FAMOTIDINE 20 MG: 10 INJECTION INTRAVENOUS at 08:39

## 2021-08-14 RX ADMIN — CARVEDILOL 6.25 MG: 6.25 TABLET, FILM COATED ORAL at 08:38

## 2021-08-14 NOTE — DISCHARGE SUMMARY
[facility]        HOSPITALIST  DISCHARGE SUMMARY    Patient Name: Carley Deng  : 1960  MRN: [id number]    Date of Admission: 2021  Date of Discharge:  2021  Primary Care Physician: Chelsy Maynard PA-C    Consults     Date and Time Order Name Status Description    2021  8:39 AM Inpatient Gastroenterology Consult      2021  7:34 PM Inpatient Cardiology Consult      2021  6:16 PM IP General Consult (Use specialty-specific consult if known) Completed     2021  3:57 PM Consult Interventional Cardiology and Notify Cath Lab Completed           Active and Resolved Hospital Problems:  Active Hospital Problems    Diagnosis POA   • Hyperglycemia [R73.9] Yes      Resolved Hospital Problems    Diagnosis POA   • **NSTEMI, initial episode of care (CMS/[facility]) [I21.4] Unknown   • Abdominal pain [R10.9] Yes   • Chest pain in adult [R07.9] Yes       Hospital Course     Hospital Course:  Carley Deng is a 61 y.o. female admitted with chest pain patient was positive for acute non-STEMI.  Taken to cardiac cath and had a mid LAD stent by Dr. Mckeon on .  Patient was A day because of ongoing chest pain and anxiety.  Patient has been cleared by cardiology to be released.  Has been started on high-dose statin along with Brilinta and aspirin.  She is also started on Coreg.  Plan is to do cardiac rehab and 2D echo in 6 to 8 weeks.  Patient is question about changing her anxiety medicine as she has been on Celexa for 6 years and thinks it is no longer working.  It was recommended she follows with PCP for change in her anxiety medicine.  She is given work note for 2 weeks.        DISCHARGE Follow Up Recommendations for labs and diagnostics: Cardiac rehab and echo in 6-8 weeks.      Day of Discharge     Vital Signs:  Temp:  [97.7 °F (36.5 °C)-98.2 °F (36.8 °C)] 98.2 °F (36.8 °C)  Heart Rate:  [77-82] 77  Resp:  [16-18] 16  BP: (105-132)/(59-65) 116/62  Flow (L/min):  [1]  1    Physical Exam:  Awake alert oriented in no acute distress  Neck supple.  Heart regular.  Lungs clear.  Abdomen obese and soft mild epigastric tenderness no guarding no rebound.  Extremities no edema    Discharge Details        Discharge Medications      New Medications      Instructions Start Date   aspirin 81 MG EC tablet   81 mg, Oral, Daily   Start Date: August 15, 2021     atorvastatin 80 MG tablet  Commonly known as: LIPITOR   80 mg, Oral, Nightly      carvedilol 6.25 MG tablet  Commonly known as: COREG   6.25 mg, Oral, 2 Times Daily With Meals      ticagrelor 90 MG tablet tablet  Commonly known as: BRILINTA   90 mg, Oral, 2 Times Daily         Continue These Medications      Instructions Start Date   CeleXA 20 MG tablet  Generic drug: citalopram   20 mg, Oral, Daily      irbesartan 300 MG tablet  Commonly known as: AVAPRO   300 mg, Oral, Nightly         Stop These Medications    pravastatin 20 MG tablet  Commonly known as: PRAVACHOL            Allergies   Allergen Reactions   • Eggs Or Egg-Derived Products Anaphylaxis   • Erythromycin Unknown - High Severity       Discharge Disposition:  Home or Self Care    Diet:  Diet Instructions     Diet: Cardiac      Discharge Diet: Cardiac          Discharge Activity:   Activity Instructions     Activity as Tolerated      Work Restrictions      Type of Restriction: Work    May Return to Work: Other    Return to Work Instructions: After 2 weeks from discharge date          CODE STATUS:  Code Status and Medical Interventions:   Ordered at: 08/12/21 0800     Code Status:    CPR     Medical Interventions (Level of Support Prior to Arrest):    Full         No future appointments.    Additional Instructions for the Follow-ups that You Need to Schedule     Discharge Follow-up with PCP   As directed       Currently Documented PCP:    Chelsy Maynard PA-C    PCP Phone Number:    [phone]     Follow Up Details: 1 week         Discharge Follow-up with Specified Provider:  Dr. Mckeon; 2 Weeks   As directed      To: Dr. Mckeon    Follow Up: 2 Weeks    Follow Up Details: Cardiac rehab               Pertinent  and/or Most Recent Results     PROCEDURES:   Procedures:    * Left Heart Cath with coronary angiography and possible right heart cath. Possible PTCA/stent Insertion.     LAB RESULTS:      Lab 08/13/21  0923 08/12/21  1143 08/11/21  1557   WBC 10.04 8.06 6.87   HEMOGLOBIN 13.2 14.3 14.7   HEMATOCRIT 39.7 41.2 42.6   PLATELETS 217 238 242   NEUTROS ABS 8.60* 6.42 4.96   IMMATURE GRANS (ABS) 0.04 0.02 0.03   LYMPHS ABS 0.69* 0.96 1.30   MONOS ABS 0.60 0.54 0.46   EOS ABS 0.07 0.09 0.08   MCV 92.5 89.6 90.4   PROTIME  --  10.6 10.3   APTT  --  23.4  --          Lab 08/13/21  0923 08/12/21  1304 08/12/21  1143 08/11/21  1609 08/11/21  1557   SODIUM 135*  --  138  --  139   POTASSIUM 3.9  --  4.0  --  3.6   CHLORIDE 104  --  105  --  103   CO2 19.9*  --  20.5*  --  19.7*   ANION GAP 11.1  --  12.5  --  16.3*   BUN 13  --  11  --  13   CREATININE 1.06*  --  0.92 1.00 1.05*   GLUCOSE 136*  --  141*  --  177*   CALCIUM 8.8  --  9.1  --  9.5   MAGNESIUM  --   --   --   --  1.9   HEMOGLOBIN A1C  --   --  6.07*  --   --    TSH  --  1.960  --   --   --          Lab 08/11/21  1557   TOTAL PROTEIN 7.5   ALBUMIN 4.30   GLOBULIN 3.2   ALT (SGPT) 17   AST (SGOT) 17   BILIRUBIN 0.4   ALK PHOS 112         Lab 08/13/21  0923 08/12/21  1143 08/12/21  0913 08/11/21  1557   TROPONIN T 0.632*  --  0.780*  --    PROTIME  --  10.6  --  10.3   INR  --  0.96*  --  0.93*         Lab 08/12/21  1304   CHOLESTEROL 207*   LDL CHOL 126*   HDL CHOL 53   TRIGLYCERIDES 157*             Brief Urine Lab Results     None        Microbiology Results (last 10 days)     Procedure Component Value - Date/Time    COVID PRE-OP / PRE-PROCEDURE SCREENING ORDER (NO ISOLATION) - Swab, Nasopharynx [[id number]]  (Normal) Collected: 08/11/21 2100    Lab Status: Final result Specimen: Swab from Nasopharynx Updated: 08/12/21 1211     Narrative:      The following orders were created for panel order COVID PRE-OP / PRE-PROCEDURE SCREENING ORDER (NO ISOLATION) - Swab, Nasopharynx.  Procedure                               Abnormality         Status                     ---------                               -----------         ------                     COVID-19,CEPHEID/TR/BD...[[id number]]  Normal              Final result                 Please view results for these tests on the individual orders.    COVID-19,CEPHEID/TR/BDMAX,COR/KANCHAN/PAD/HETAL IN-HOUSE(OR EMERGENT/ADD-ON),NP SWAB IN TRANSPORT MEDIA 3-4 HR TAT, RT-PCR - Swab, Nasopharynx [[id number]]  (Normal) Collected: 08/11/21 2106    Lab Status: Final result Specimen: Swab from Nasopharynx Updated: 08/12/21 1211     COVID19 Not Detected    Narrative:      Fact sheet for providers: https://www.fda.gov/media/464035/download     Fact sheet for patients: https://www.fda.gov/media/005965/download  Fact sheet for providers: https://www.fda.gov/media/783292/download     Fact sheet for patients: https://www.fda.gov/media/690529/download    COVID-19,CEPHEID/TR/BDMAX,COR/KANCHAN/PAD/HETAL IN-HOUSE(OR EMERGENT/ADD-ON),NP SWAB IN TRANSPORT MEDIA 3-4 HR TAT, RT-PCR - Swab, Nasopharynx [[id number]]  (Normal) Collected: 08/07/21 1128    Lab Status: Final result Specimen: Swab from Nasopharynx Updated: 08/08/21 0903     COVID19 Not Detected    Narrative:      Fact sheet for providers: https://www.fda.gov/media/035752/download     Fact sheet for patients: https://www.fda.gov/media/475535/download  Fact sheet for providers: https://www.fda.gov/media/056767/download     Fact sheet for patients: https://www.fda.gov/media/990342/download    COVID-19,CEPHEID,COR/KANCHAN/PAD/HETAL IN-HOUSE(OR EMERGENT/ADD-ON),NP SWAB IN TRANSPORT MEDIA 3-4 HR TAT, RT-PCR - Swab, Nasopharynx [[id number]]  (Normal) Collected: 08/05/21 1827    Lab Status: Final result Specimen: Swab from Nasopharynx Updated: 08/06/21 1536     COVID19 Not Detected     Narrative:      Fact sheet for providers: https://www.fda.gov/media/711691/download     Fact sheet for patients: https://www.fda.gov/media/217450/download  Fact sheet for providers: https://www.fda.gov/media/435485/download     Fact sheet for patients: https://www.fda.gov/media/095739/download                           Imaging Results (All)     Procedure Component Value Units Date/Time    XR Chest 1 View [[id number]] Collected: 08/11/21 1707     Updated: 08/11/21 1712    Narrative:      PROCEDURE: XR CHEST 1 VW     COMPARISON: None     INDICATIONS: CHEST PAIN     FINDINGS:   The heart appears mildly enlarged.  Mediastinal contour appears grossly normal.  No suspicious   focal or diffuse pulmonary infiltrate is seen.  No pleural effusion or pneumothorax is identified.    No definite acute osseous abnormality is seen.     CONCLUSION:   1. Cardiomegaly.  2. No definite radiographic findings of acute cardiopulmonary abnormality.                  ARMINDA MADDEN MD         Electronically Signed and Approved By: ARMINDA MADDEN MD on 8/11/2021 at 17:08                            Labs Pending at Discharge:          Time spent on Discharge including face to face service: More than 30 minutes  Part of this note may be an electronic transcription/translation of spoken language to printed text using the Dragon Dictation System.     TElectronically signed by Indra Viramontes MD, 08/14/21, 3:01 PM EDT.

## 2021-08-14 NOTE — PLAN OF CARE
Goal Outcome Evaluation:  Plan of Care Reviewed With: patient        Progress: improving  Outcome Summary: Patient rested comfortably for much of the night. Given xanax at bedtime to help relieve anxiety. Voiced some shortness of air around 0430, applied o2 which seemed to help. Vital signs stable. No acute changes. ANDREW,RN

## 2021-08-15 ENCOUNTER — READMISSION MANAGEMENT (OUTPATIENT)
Dept: CALL CENTER | Facility: HOSPITAL | Age: 61
End: 2021-08-15

## 2021-08-15 NOTE — OUTREACH NOTE
Prep Survey      Responses   Denominational facility patient discharged from?  [facility]   Is LACE score < 7 ?  No   Emergency Room discharge w/ pulse ox?  No   Eligibility  Readm Mgmt   Discharge diagnosis  NSTEMI, initial episode of care    Does the patient have one of the following disease processes/diagnoses(primary or secondary)?  Acute MI (STEMI,NSTEMI)   Does the patient have Home health ordered?  No   Is there a DME ordered?  No   Medication alerts for this patient  ASA, Brilinta    General alerts for this patient  Heart Cath    Prep survey completed?  Yes          Jane Jo RN

## 2021-08-16 ENCOUNTER — NURSE TRIAGE (OUTPATIENT)
Dept: CALL CENTER | Facility: HOSPITAL | Age: 61
End: 2021-08-16

## 2021-08-16 NOTE — TELEPHONE ENCOUNTER
She was discharged from [facility] on 08/14/2021- Nstemi She is calling about her discharge medications. The discharge medication list was discussed in great detail. She states she now understands she can purchase the Pepcid OTC.     Reason for Disposition  • Caller has medicine question, adult has minor symptoms, caller declines triage, AND triager answers question    Additional Information  • Negative: [1] Intentional drug overdose AND [2] suicidal thoughts or ideas  • Negative: Drug overdose and triager unable to answer question  • Negative: Caller requesting information unrelated to medicine  • Negative: Caller requesting information about COVID-19 Vaccine  • Negative: Caller requesting information about Emergency Contraception  • Negative: Caller requesting information about Combined Birth Control Pills  • Negative: Caller requesting information about Progestin Birth Control Pills  • Negative: Caller requesting information about Post-Op pain or medicines  • Negative: Caller requesting a prescription antibiotic (such as Penicillin) for Strep throat and has a positive culture result  • Negative: Caller requesting a prescription anti-viral med (such as Tamiflu) and has influenza (flu)  symptoms  • Negative: Immunization reaction suspected  • Negative: Rash while taking a medicine or within 3 days of stopping it  • Negative: [1] Asthma and [2] having symptoms of asthma (cough, wheezing, etc.)  • Negative: [1] Symptom of illness (e.g., headache, abdominal pain, earache, vomiting) AND [2] more than mild  • Negative: Breastfeeding questions about mother's medicines and diet  • Negative: MORE THAN A DOUBLE DOSE of a prescription or over-the-counter (OTC) drug  • Negative: [1] DOUBLE DOSE (an extra dose or lesser amount) of prescription drug AND [2] any symptoms (e.g., dizziness, nausea, pain, sleepiness)  • Negative: [1] DOUBLE DOSE (an extra dose or lesser amount) of over-the-counter (OTC) drug AND [2] any symptoms  (e.g., dizziness, nausea, pain, sleepiness)  • Negative: Took another person's prescription drug  • Negative: [1] DOUBLE DOSE (an extra dose or lesser amount) of prescription drug AND [2] NO symptoms (Exception: a double dose of antibiotics)  • Negative: Diabetes drug error or overdose (e.g., took wrong type of insulin or took extra dose)  • Negative: [1] Prescription refill request for ESSENTIAL medicine (i.e., likelihood of harm to patient if not taken) AND [2] triager unable to refill per department policy  • Negative: [1] Prescription not at pharmacy AND [2] was prescribed by PCP recently (Exception: triager has access to EMR and prescription is recorded there. Go to Home Care and confirm for pharmacy.)  • Negative: [1] Pharmacy calling with prescription question AND [2] triager unable to answer question  • Negative: [1] Caller has URGENT medicine question about med that PCP or specialist prescribed AND [2] triager unable to answer question  • Negative: Medicine patch causing local rash or itching  • Negative: [1] Caller has medicine question about med NOT prescribed by PCP AND [2] triager unable to answer question (e.g., compatibility with other med, storage)  • Negative: Prescription request for new medicine (not a refill)  • Negative: Prescription refill request for a CONTROLLED substance (e.g., narcotics, ADHD medicines)  • Negative: [1] Prescription refill request for NON-ESSENTIAL medicine (i.e., no harm to patient if med not taken) AND [2] triager unable to refill per department policy  • Negative: [1] Caller has NON-URGENT medicine question about med that PCP prescribed AND [2] triager unable to answer question  • Negative: Caller wants to use a complementary or alternative medicine  • Negative: [1] Prescription prescribed recently is not at pharmacy AND [2] triager has access to patient's EMR AND [3] prescription is recorded in the EMR  • Negative: [1] DOUBLE DOSE (an extra dose or lesser amount) of  "over-the-counter (OTC) drug AND [2] NO symptoms  • Negative: [1] DOUBLE DOSE (an extra dose or lesser amount) of antibiotic drug AND [2] NO symptoms  • Negative: Caller has medicine question only, adult not sick, AND triager answers question    Answer Assessment - Initial Assessment Questions  1. NAME of MEDICATION: \"What medicine are you calling about?\"      D/C medications   2. QUESTION: \"What is your question?\" (e.g., medication refill, side effect)      She wants to know how to get the Pepcid   3. PRESCRIBING HCP: \"Who prescribed it?\" Reason: if prescribed by specialist, call should be referred to that group.      Hospital service.   4. SYMPTOMS: \"Do you have any symptoms?\"      Stomach issues, unchanged from the hospital.   5. SEVERITY: If symptoms are present, ask \"Are they mild, moderate or severe?\"      Mild   6. PREGNANCY:  \"Is there any chance that you are pregnant?\" \"When was your last menstrual period?\"      n    Protocols used: MEDICATION QUESTION CALL-ADULT-      "

## 2021-08-17 ENCOUNTER — READMISSION MANAGEMENT (OUTPATIENT)
Dept: CALL CENTER | Facility: HOSPITAL | Age: 61
End: 2021-08-17

## 2021-08-17 NOTE — OUTREACH NOTE
AMI Week 1 Survey      Responses   [facility] patient discharged from?  [facility]   Does the patient have one of the following disease processes/diagnoses(primary or secondary)?  Acute MI (STEMI,NSTEMI)   Week 1 attempt successful?  Yes   Call start time  1625   Call end time  1628   General alerts for this patient  Heart Cath    Discharge diagnosis  NSTEMI, initial episode of care    Meds reviewed with patient/caregiver?  Yes   Is the patient having any side effects they believe may be caused by any medication additions or changes?  No   Does the patient have all prescriptions related to this admission filled (includes statins,anticoagulants,HTN meds,anti-arrhythmia meds)  Yes   Is the patient taking all medications as directed (includes completed medication regime)?  Yes   Does the patient have a primary care provider?   Yes   Does the patient have an appointment with their PCP,cardiologist,or clinic within 7 days of discharge?  Greater than 7 days   Comments regarding PCP  PCP APPOINTMENT IS 8/23/21   What is preventing the patient from scheduling follow up appointments within 7 days of discharge?  Earlier appointment not available   Nursing Interventions  Verified appointment date/time/provider   Has the patient kept scheduled appointments due by today?  N/A   Has home health visited the patient within 72 hours of discharge?  N/A   Psychosocial issues?  No   Did the patient receive a copy of their discharge instructions?  Yes   Nursing interventions  Reviewed instructions with patient   What is the patient's perception of their health status since discharge?  Improving   Nursing interventions  Nurse provided patient education   Is the patient/caregiver able to teach back signs and symptoms of when to call for help immediately:  Sudden chest discomfort, Sudden discomfort in arms, back, neck or jaw, Shortness of breath at any time, Sudden sweating or clammy skin, Nausea or vomiting, Dizziness or  lightheadedness, Irregular or rapid heart rate   Nursing interventions  Nurse provided patient education   Is the pateint /caregiver able to teach back the importance of cardiac rehab?  Yes   Nursing interventions  Provided education on importance of cardiac rehab   Is the patient/caregiver able to teach back lifestyle changes to help prevent MIs  Regular exercise as approved by provider, Heart healthy diet, Maintaining a healthy weight, Reducing stress   Is the patient/caregiver able to teach back ways to prevent a second heart attack:  Take medications, Follow up with MD, Participate in Cardiac Rehab, Manage risk factors   If the patient is a current smoker, are they able to teach back resources for cessation?  Not a smoker   Is the patient/caregiver able to teach back the hierarchy of who to call/visit for symptoms/problems? PCP, Specialist, Home health nurse, Urgent Care, ED, 911  Yes   Week 1 call completed?  Yes          Sarita Espinoza LPN

## 2021-08-20 LAB — TROPONIN I SERPL-MCNC: 0.03 NG/ML (ref 0–0.6)

## 2021-08-24 ENCOUNTER — READMISSION MANAGEMENT (OUTPATIENT)
Dept: CALL CENTER | Facility: HOSPITAL | Age: 61
End: 2021-08-24

## 2021-08-24 NOTE — OUTREACH NOTE
AMI Week 2 Survey      Responses   [facility] patient discharged from?  [facility]   Does the patient have one of the following disease processes/diagnoses(primary or secondary)?  Acute MI (STEMI,NSTEMI)   Week 2 attempt successful?  Yes   Call start time  1012   Call end time  1016   Discharge diagnosis  NSTEMI, initial episode of care    Meds reviewed with patient/caregiver?  Yes   Is the patient taking all medications as directed (includes completed medication regime)?  Yes   Has the patient kept scheduled appointments due by today?  Yes   Comments  08/26/2021 Cardiology                   PCP APRN 08/23/2021   What is the patient's perception of their health status since discharge?  Improving   Week 2 call completed?  Yes          Anabel Diaz RN

## 2021-08-26 ENCOUNTER — OFFICE VISIT (OUTPATIENT)
Dept: CARDIOLOGY | Facility: CLINIC | Age: 61
End: 2021-08-26

## 2021-08-26 VITALS
WEIGHT: 212 LBS | HEART RATE: 77 BPM | DIASTOLIC BLOOD PRESSURE: 83 MMHG | HEIGHT: 65 IN | SYSTOLIC BLOOD PRESSURE: 121 MMHG | BODY MASS INDEX: 35.32 KG/M2

## 2021-08-26 DIAGNOSIS — I25.10 CORONARY ARTERY DISEASE INVOLVING NATIVE CORONARY ARTERY OF NATIVE HEART, ANGINA PRESENCE UNSPECIFIED: Primary | ICD-10-CM

## 2021-08-26 DIAGNOSIS — E78.5 HYPERLIPIDEMIA LDL GOAL <70: ICD-10-CM

## 2021-08-26 DIAGNOSIS — I10 ESSENTIAL HYPERTENSION: ICD-10-CM

## 2021-08-26 DIAGNOSIS — I50.21 ACUTE SYSTOLIC CONGESTIVE HEART FAILURE (HCC): ICD-10-CM

## 2021-08-26 PROCEDURE — 99214 OFFICE O/P EST MOD 30 MIN: CPT | Performed by: INTERNAL MEDICINE

## 2021-08-26 RX ORDER — NITROGLYCERIN 0.4 MG/1
TABLET SUBLINGUAL
Qty: 100 TABLET | Refills: 11 | Status: SHIPPED | OUTPATIENT
Start: 2021-08-26

## 2021-08-26 NOTE — ASSESSMENT & PLAN NOTE
Coronary artery disease is currently stable.  She was admitted to the hospital with acute coronary syndrome, panned out to be a non-ST elevation MI and underwent PCI of subtotally occluded mid LAD lesion with a drug-eluting stent.  She is doing a lot better.  I have explained to her how to use nitroglycerin in case she has anginal symptomatology.  She will continue the current dosage of her beta-blocker, carvedilol and ticagrelor along with a baby aspirin for prevention of stent thrombosis

## 2021-08-26 NOTE — PROGRESS NOTES
Chief Complaint  Shortness of Breath    Subjective            Carley Deng presents to [facility] CARDIOLOGY   She was admitted to the hospital with acute coronary syndrome, panned out to be a non-ST elevation MI and underwent PCI of subtotally occluded mid LAD lesion with a drug-eluting stent.  She is doing a lot better.  I have explained to her how to use nitroglycerin in case she has anginal symptomatology.  She will continue the current dosage of her beta-blocker, carvedilol and ticagrelor along with a baby aspirin for prevention of stent thrombosis        Past Medical History:   Diagnosis Date   • Abnormal ECG    • Anxiety    • Benign essential tremor    • Depression    • Hyperlipemia    • Hypertension    • Limb swelling    • Lumbago    • Myocardial infarction (CMS/HCC)    • Seasonal allergies        Allergies   Allergen Reactions   • Eggs Or Egg-Derived Products Anaphylaxis   • Erythromycin Unknown - High Severity        Past Surgical History:   Procedure Laterality Date   • CARDIAC CATHETERIZATION Left 2021    Procedure: Left Heart Cath with coronary angiography and possible right heart cath. Possible PTCA/stent Insertion.;  Surgeon: Dc Mckeon MD;  [facility];  Service: Cardiovascular;  Laterality: Left;   •  SECTION     • CHOLECYSTECTOMY     • COLONOSCOPY     • CORONARY STENT PLACEMENT     • ENDOSCOPY     • GALLBLADDER SURGERY     • HYSTERECTOMY     • HYSTERECTOMY          Social History     Tobacco Use   • Smoking status: Never Smoker   • Smokeless tobacco: Never Used   Vaping Use   • Vaping Use: Never used   Substance Use Topics   • Alcohol use: Never   • Drug use: Never       Family History   Problem Relation Age of Onset   • Coronary artery disease Mother    • Coronary artery disease Father         Prior to Admission medications    Medication Sig Start Date End Date Taking? Authorizing Provider   aspirin 81 MG EC tablet  "Take 1 tablet by mouth Daily for 30 days. 8/15/21 9/14/21 Yes Indra Viramontes MD   atorvastatin (LIPITOR) 80 MG tablet Take 1 tablet by mouth Every Night. 8/14/21  Yes Indra Viramontes MD   carvedilol (COREG) 6.25 MG tablet Take 1 tablet by mouth 2 (Two) Times a Day With Meals for 30 days. 8/14/21 9/13/21 Yes Indra Viramontes MD   citalopram (CeleXA) 20 MG tablet Take 40 mg by mouth Daily. Pt is taking 40mg.   Yes Emergency, Nurse Epic, RN   irbesartan (AVAPRO) 300 MG tablet Take 300 mg by mouth Every Night.   Yes Emergency, Nurse Epic, RN   ticagrelor (BRILINTA) 90 MG tablet tablet Take 1 tablet by mouth 2 (Two) Times a Day. Indications: CAD POST PCI 8/14/21  Yes Indra Viramontes MD   nitroglycerin (NITROSTAT) 0.4 MG SL tablet 1 under the tongue as needed for angina, may repeat q5mins for up three doses 8/26/21   Dc Mckeon MD        Review of Systems   Respiratory: Positive for shortness of breath. Negative for cough.    Cardiovascular: Negative for chest pain, palpitations and leg swelling.        Objective     /83   Pulse 77   Ht 165.1 cm (65\")   Wt 96.2 kg (212 lb)   BMI 35.28 kg/m²       Physical Exam  Constitutional:       General: She is awake.      Appearance: Normal appearance.   Neck:      Thyroid: No thyromegaly.      Vascular: No carotid bruit or JVD.   Cardiovascular:      Rate and Rhythm: Normal rate and regular rhythm.      Chest Wall: PMI is not displaced.      Pulses: Normal pulses.      Heart sounds: Normal heart sounds, S1 normal and S2 normal. No murmur heard.   No friction rub. No gallop. No S3 or S4 sounds.    Pulmonary:      Effort: Pulmonary effort is normal.      Breath sounds: Normal breath sounds and air entry. No wheezing, rhonchi or rales.   Abdominal:      General: Bowel sounds are normal.      Palpations: Abdomen is soft. There is no mass.      Tenderness: There is no abdominal tenderness.   Musculoskeletal:      Cervical back: Neck supple.      Right lower leg: No " edema.      Left lower leg: No edema.   Neurological:      Mental Status: She is alert and oriented to person, place, and time.   Psychiatric:         Mood and Affect: Mood normal.         Behavior: Behavior is cooperative.           Result Review :                      No results found for: PROBNP, BNP  CMP    CMP 8/11/21 8/11/21 8/12/21 8/13/21    1557 1609     Glucose 177 (A)  141 (A) 136 (A)   BUN 13  11 13   Creatinine 1.05 (A) 1.00 0.92 1.06 (A)   eGFR Non African Am 53 (A)  62 52 (A)   Sodium 139  138 135 (A)   Potassium 3.6  4.0 3.9   Chloride 103  105 104   Calcium 9.5  9.1 8.8   Albumin 4.30      Total Bilirubin 0.4      Alkaline Phosphatase 112      AST (SGOT) 17      ALT (SGPT) 17      (A) Abnormal value       Comments are available for some flowsheets but are not being displayed.           CBC w/diff    CBC w/Diff 8/11/21 8/12/21 8/13/21   WBC 6.87 8.06 10.04   RBC 4.71 4.60 4.29   Hemoglobin 14.7 14.3 13.2   Hematocrit 42.6 41.2 39.7   MCV 90.4 89.6 92.5   MCH 31.2 31.1 30.8   MCHC 34.5 34.7 33.2   RDW 12.8 13.0 13.3   Platelets 242 238 217   Neutrophil Rel % 72.2 79.7 (A) 85.6 (A)   Immature Granulocyte Rel % 0.4 0.2 0.4   Lymphocyte Rel % 18.9 (A) 11.9 (A) 6.9 (A)   Monocyte Rel % 6.7 6.7 6.0   Eosinophil Rel % 1.2 1.1 0.7   Basophil Rel % 0.6 0.4 0.4   (A) Abnormal value             Lipid Panel    Lipid Panel 8/12/21   Total Cholesterol 207 (A)   Triglycerides 157 (A)   HDL Cholesterol 53   VLDL Cholesterol 28   LDL Cholesterol  126 (A)   LDL/HDL Ratio 2.31   (A) Abnormal value             Lab Results   Component Value Date    TSH 1.960 08/12/2021      No results found for: FREET4   No results found for: DDIMERQUANT  Magnesium   Date Value Ref Range Status   08/11/2021 1.9 1.6 - 2.4 mg/dL Final      No results found for: DIGOXIN   A1C Last 3 Results    HGBA1C Last 3 Results 8/12/21   Hemoglobin A1C 6.07 (A)   (A) Abnormal value                      Assessment and Plan        Diagnoses and all orders  for this visit:    1. Coronary artery disease involving native coronary artery of native heart, angina presence unspecified (Primary)  Assessment & Plan:  Coronary artery disease is currently stable.  She was admitted to the hospital with acute coronary syndrome, panned out to be a non-ST elevation MI and underwent PCI of subtotally occluded mid LAD lesion with a drug-eluting stent.  She is doing a lot better.  I have explained to her how to use nitroglycerin in case she has anginal symptomatology.  She will continue the current dosage of her beta-blocker, carvedilol and ticagrelor along with a baby aspirin for prevention of stent thrombosis    Orders:  -     Adult Transthoracic Echo Complete W/ Cont if Necessary Per Protocol; Future  -     Treadmill Stress Test; Future  -     CBC (No Diff); Future  -     Comprehensive Metabolic Panel; Future  -     proBNP; Future  -     Lipid Panel; Future    2. Hyperlipidemia LDL goal <70  -     Adult Transthoracic Echo Complete W/ Cont if Necessary Per Protocol; Future  -     Treadmill Stress Test; Future  -     CBC (No Diff); Future  -     Comprehensive Metabolic Panel; Future  -     proBNP; Future  -     Lipid Panel; Future    3. Essential hypertension  -     Adult Transthoracic Echo Complete W/ Cont if Necessary Per Protocol; Future  -     Treadmill Stress Test; Future  -     CBC (No Diff); Future  -     Comprehensive Metabolic Panel; Future  -     proBNP; Future  -     Lipid Panel; Future    4. Acute systolic congestive heart failure (CMS/HCC)  -     Adult Transthoracic Echo Complete W/ Cont if Necessary Per Protocol; Future  -     Treadmill Stress Test; Future  -     CBC (No Diff); Future  -     Comprehensive Metabolic Panel; Future  -     proBNP; Future  -     Lipid Panel; Future    Other orders  -     nitroglycerin (NITROSTAT) 0.4 MG SL tablet; 1 under the tongue as needed for angina, may repeat q5mins for up three doses  Dispense: 100 tablet; Refill: 11            Follow  Up     Return for treadmill ov and echo and bw  in 2-3 weeks.    Patient was given instructions and counseling regarding her condition or for health maintenance advice. Please see specific information pulled into the AVS if appropriate.

## 2021-09-08 ENCOUNTER — LAB (OUTPATIENT)
Dept: LAB | Facility: HOSPITAL | Age: 61
End: 2021-09-08

## 2021-09-08 DIAGNOSIS — I25.10 CORONARY ARTERY DISEASE INVOLVING NATIVE CORONARY ARTERY OF NATIVE HEART, ANGINA PRESENCE UNSPECIFIED: ICD-10-CM

## 2021-09-08 DIAGNOSIS — I10 ESSENTIAL HYPERTENSION: ICD-10-CM

## 2021-09-08 DIAGNOSIS — I50.21 ACUTE SYSTOLIC CONGESTIVE HEART FAILURE (HCC): ICD-10-CM

## 2021-09-08 DIAGNOSIS — E78.5 HYPERLIPIDEMIA LDL GOAL <70: ICD-10-CM

## 2021-09-08 LAB
ALBUMIN SERPL-MCNC: 4.4 G/DL (ref 3.5–5.2)
ALBUMIN/GLOB SERPL: 1.5 G/DL
ALP SERPL-CCNC: 104 U/L (ref 39–117)
ALT SERPL W P-5'-P-CCNC: 21 U/L (ref 1–33)
ANION GAP SERPL CALCULATED.3IONS-SCNC: 13.5 MMOL/L (ref 5–15)
AST SERPL-CCNC: 19 U/L (ref 1–32)
BILIRUB SERPL-MCNC: 0.4 MG/DL (ref 0–1.2)
BUN SERPL-MCNC: 18 MG/DL (ref 8–23)
BUN/CREAT SERPL: 19.6 (ref 7–25)
CALCIUM SPEC-SCNC: 9.3 MG/DL (ref 8.6–10.5)
CHLORIDE SERPL-SCNC: 108 MMOL/L (ref 98–107)
CHOLEST SERPL-MCNC: 146 MG/DL (ref 0–200)
CO2 SERPL-SCNC: 20.5 MMOL/L (ref 22–29)
CREAT SERPL-MCNC: 0.92 MG/DL (ref 0.57–1)
DEPRECATED RDW RBC AUTO: 44 FL (ref 37–54)
ERYTHROCYTE [DISTWIDTH] IN BLOOD BY AUTOMATED COUNT: 13.1 % (ref 12.3–15.4)
GFR SERPL CREATININE-BSD FRML MDRD: 62 ML/MIN/1.73
GLOBULIN UR ELPH-MCNC: 2.9 GM/DL
GLUCOSE SERPL-MCNC: 135 MG/DL (ref 65–99)
HCT VFR BLD AUTO: 41.9 % (ref 34–46.6)
HDLC SERPL-MCNC: 69 MG/DL (ref 40–60)
HGB BLD-MCNC: 14.3 G/DL (ref 12–15.9)
LDLC SERPL CALC-MCNC: 63 MG/DL (ref 0–100)
LDLC/HDLC SERPL: 0.9 {RATIO}
MCH RBC QN AUTO: 31.2 PG (ref 26.6–33)
MCHC RBC AUTO-ENTMCNC: 34.1 G/DL (ref 31.5–35.7)
MCV RBC AUTO: 91.3 FL (ref 79–97)
NT-PROBNP SERPL-MCNC: 836.3 PG/ML (ref 0–900)
PLATELET # BLD AUTO: 282 10*3/MM3 (ref 140–450)
PMV BLD AUTO: 10 FL (ref 6–12)
POTASSIUM SERPL-SCNC: 4.2 MMOL/L (ref 3.5–5.2)
PROT SERPL-MCNC: 7.3 G/DL (ref 6–8.5)
RBC # BLD AUTO: 4.59 10*6/MM3 (ref 3.77–5.28)
SODIUM SERPL-SCNC: 142 MMOL/L (ref 136–145)
TRIGL SERPL-MCNC: 74 MG/DL (ref 0–150)
VLDLC SERPL-MCNC: 14 MG/DL (ref 5–40)
WBC # BLD AUTO: 11.18 10*3/MM3 (ref 3.4–10.8)

## 2021-09-08 PROCEDURE — 80053 COMPREHEN METABOLIC PANEL: CPT

## 2021-09-08 PROCEDURE — 85027 COMPLETE CBC AUTOMATED: CPT

## 2021-09-08 PROCEDURE — 36415 COLL VENOUS BLD VENIPUNCTURE: CPT

## 2021-09-08 PROCEDURE — 83880 ASSAY OF NATRIURETIC PEPTIDE: CPT

## 2021-09-08 PROCEDURE — 80061 LIPID PANEL: CPT

## 2021-09-13 ENCOUNTER — LAB (OUTPATIENT)
Dept: LAB | Facility: HOSPITAL | Age: 61
End: 2021-09-13

## 2021-09-13 ENCOUNTER — TRANSCRIBE ORDERS (OUTPATIENT)
Dept: LAB | Facility: HOSPITAL | Age: 61
End: 2021-09-13

## 2021-09-13 DIAGNOSIS — Z01.818 PRE-OP TESTING: Primary | ICD-10-CM

## 2021-09-13 DIAGNOSIS — Z01.818 PRE-OP TESTING: ICD-10-CM

## 2021-09-13 PROCEDURE — U0004 COV-19 TEST NON-CDC HGH THRU: HCPCS

## 2021-09-13 PROCEDURE — C9803 HOPD COVID-19 SPEC COLLECT: HCPCS

## 2021-09-14 LAB — SARS-COV-2 RNA NOSE QL NAA+PROBE: NOT DETECTED

## 2021-09-15 ENCOUNTER — OFFICE VISIT (OUTPATIENT)
Dept: CARDIOLOGY | Facility: CLINIC | Age: 61
End: 2021-09-15

## 2021-09-15 VITALS
HEART RATE: 70 BPM | DIASTOLIC BLOOD PRESSURE: 78 MMHG | WEIGHT: 212 LBS | BODY MASS INDEX: 35.32 KG/M2 | HEIGHT: 65 IN | SYSTOLIC BLOOD PRESSURE: 140 MMHG

## 2021-09-15 DIAGNOSIS — E78.5 HYPERLIPIDEMIA LDL GOAL <70: ICD-10-CM

## 2021-09-15 DIAGNOSIS — I25.10 CORONARY ARTERY DISEASE INVOLVING NATIVE CORONARY ARTERY OF NATIVE HEART WITHOUT ANGINA PECTORIS: Primary | ICD-10-CM

## 2021-09-15 DIAGNOSIS — I50.21 ACUTE SYSTOLIC CONGESTIVE HEART FAILURE (HCC): ICD-10-CM

## 2021-09-15 DIAGNOSIS — I10 ESSENTIAL HYPERTENSION: ICD-10-CM

## 2021-09-15 PROCEDURE — 99214 OFFICE O/P EST MOD 30 MIN: CPT | Performed by: INTERNAL MEDICINE

## 2021-09-15 RX ORDER — SPIRONOLACTONE 25 MG/1
25 TABLET ORAL EVERY OTHER DAY
Qty: 45 TABLET | Refills: 3 | Status: SHIPPED | OUTPATIENT
Start: 2021-09-15 | End: 2021-10-25

## 2021-09-15 RX ORDER — CARVEDILOL 6.25 MG/1
6.25 TABLET ORAL 2 TIMES DAILY WITH MEALS
Qty: 60 TABLET | Refills: 0 | Status: SHIPPED | OUTPATIENT
Start: 2021-09-15 | End: 2022-05-02 | Stop reason: SDUPTHER

## 2021-09-15 RX ORDER — CARVEDILOL 6.25 MG/1
6.25 TABLET ORAL 2 TIMES DAILY WITH MEALS
Qty: 180 TABLET | Refills: 3 | Status: SHIPPED | OUTPATIENT
Start: 2021-09-15 | End: 2022-04-13

## 2021-09-15 NOTE — PROGRESS NOTES
Chief Complaint  Follow-up (Echo and treadmill results)    Subjective            Carley Deng presents to [facility] CARDIOLOGY  Carley is a 61 years old female who is s/p non-ST elevation anterior wall myocardial infarction and acute systolic heart failure who is doing much better.  Based on her echocardiogram today her LV function is almost back to normal with markedly improved ejection fraction.      Past Medical History:   Diagnosis Date   • Abnormal ECG    • Anxiety    • Benign essential tremor    • Depression    • Hyperlipemia    • Hypertension    • Limb swelling    • Lumbago    • Myocardial infarction (CMS/HCC)    • Seasonal allergies        Allergies   Allergen Reactions   • Eggs Or Egg-Derived Products Anaphylaxis   • Erythromycin Unknown - High Severity        Past Surgical History:   Procedure Laterality Date   • CARDIAC CATHETERIZATION Left 2021    Procedure: Left Heart Cath with coronary angiography and possible right heart cath. Possible PTCA/stent Insertion.;  Surgeon: Dc Mckeon MD;  Location: [facility] INVASIVE LOCATION;  Service: Cardiovascular;  Laterality: Left;   •  SECTION     • CHOLECYSTECTOMY     • COLONOSCOPY     • CORONARY STENT PLACEMENT     • ENDOSCOPY     • GALLBLADDER SURGERY     • HYSTERECTOMY     • HYSTERECTOMY          Social History     Tobacco Use   • Smoking status: Never Smoker   • Smokeless tobacco: Never Used   Vaping Use   • Vaping Use: Never used   Substance Use Topics   • Alcohol use: Never   • Drug use: Never       Family History   Problem Relation Age of Onset   • Coronary artery disease Mother    • Coronary artery disease Father         Prior to Admission medications    Medication Sig Start Date End Date Taking? Authorizing Provider   atorvastatin (LIPITOR) 80 MG tablet Take 1 tablet by mouth Every Night. 21  Yes Indra Viramontes MD   citalopram (CeleXA) 20 MG tablet Take 40 mg by mouth Daily. Pt is taking 40mg.    "Yes Emergency, Nurse Epic, RN   irbesartan (AVAPRO) 300 MG tablet Take 300 mg by mouth Every Night.   Yes Emergency, Nurse Pieter, RN   nitroglycerin (NITROSTAT) 0.4 MG SL tablet 1 under the tongue as needed for angina, may repeat q5mins for up three doses 8/26/21  Yes Dc Mckeon MD   ticagrelor (BRILINTA) 90 MG tablet tablet Take 1 tablet by mouth 2 (Two) Times a Day. Indications: CAD POST PCI 8/14/21  Yes Indra Viramontes MD   aspirin 81 MG EC tablet Take 1 tablet by mouth Daily for 30 days. 8/15/21 9/14/21  Indra Viramontes MD   carvedilol (COREG) 6.25 MG tablet Take 1 tablet by mouth 2 (Two) Times a Day With Meals for 30 days. 8/14/21 9/13/21  Indra Viramontes MD        Review of Systems   Respiratory: Positive for shortness of breath (from Brilinta). Negative for cough.    Cardiovascular: Positive for palpitations (last night for a brief minute - beating fast). Negative for chest pain and leg swelling.        Objective     /78 (Patient Position: Sitting)   Pulse 70   Ht 165.1 cm (65\")   Wt 96.2 kg (212 lb)   BMI 35.28 kg/m²       Physical Exam  Constitutional:       General: She is awake.      Appearance: Normal appearance.   Neck:      Thyroid: No thyromegaly.      Vascular: No carotid bruit or JVD.   Cardiovascular:      Rate and Rhythm: Normal rate and regular rhythm.      Chest Wall: PMI is not displaced.      Pulses: Normal pulses.      Heart sounds: Normal heart sounds, S1 normal and S2 normal. No murmur heard.   No friction rub. No gallop. No S3 or S4 sounds.    Pulmonary:      Effort: Pulmonary effort is normal.      Breath sounds: Normal breath sounds and air entry. No wheezing, rhonchi or rales.   Abdominal:      General: Bowel sounds are normal.      Palpations: Abdomen is soft. There is no mass.      Tenderness: There is no abdominal tenderness.   Musculoskeletal:      Cervical back: Neck supple.      Right lower leg: No edema.      Left lower leg: No edema.   Neurological:      Mental " Status: She is alert and oriented to person, place, and time.   Psychiatric:         Mood and Affect: Mood normal.         Behavior: Behavior is cooperative.              Result Review :                           Assessment and Plan        Diagnoses and all orders for this visit:    1. Coronary artery disease involving native coronary artery of native heart without angina pectoris (Primary)  Assessment & Plan:  Coronary artery disease is stable and improving.  No further angina.  Shortness of breath still present some with Brilinta either episodes with exertion.    Orders:  -     Lipid Panel; Future  -     Comprehensive Metabolic Panel; Future  -     Magnesium; Future  -     Cancel: Basic Metabolic Panel; Future  -     Cancel: proBNP; Future  -     proBNP; Future  -     Basic Metabolic Panel; Future  -     proBNP; Future  -     Ambulatory Referral to Cardiac Rehab    2. Acute systolic congestive heart failure (CMS/HCC)  Assessment & Plan:  Congestive heart failure due to coronary artery disease is improving.  She still has some shortness of breath and mild pedal edema with mild hypertension.  I am going to start spironolactone 25 mg every other day and have her do a 2-week blood pressure log starting next week    Orders:  -     Lipid Panel; Future  -     Comprehensive Metabolic Panel; Future  -     Magnesium; Future  -     Cancel: Basic Metabolic Panel; Future  -     Cancel: proBNP; Future  -     proBNP; Future  -     Basic Metabolic Panel; Future  -     proBNP; Future  -     Ambulatory Referral to Cardiac Rehab    3. Hyperlipidemia LDL goal <70  Assessment & Plan:  Lipid abnormalities are much improved and approaching goal of less than 60.  We will continue strict dietary management and atorvastatin 80 mg a day.  If by the time of next check we will restart your will add Zetia or change it to Crestor    Orders:  -     Lipid Panel; Future  -     Comprehensive Metabolic Panel; Future  -     Magnesium; Future  -      Cancel: Basic Metabolic Panel; Future  -     Cancel: proBNP; Future  -     proBNP; Future  -     Basic Metabolic Panel; Future  -     proBNP; Future  -     Ambulatory Referral to Cardiac Rehab    4. Essential hypertension  -     Lipid Panel; Future  -     Comprehensive Metabolic Panel; Future  -     Magnesium; Future  -     Cancel: Basic Metabolic Panel; Future  -     Cancel: proBNP; Future  -     proBNP; Future  -     Basic Metabolic Panel; Future  -     proBNP; Future  -     Ambulatory Referral to Cardiac Rehab    Other orders  -     carvedilol (COREG) 6.25 MG tablet; Take 1 tablet by mouth 2 (Two) Times a Day With Meals for 30 days.  Dispense: 60 tablet; Refill: 0  -     ticagrelor (BRILINTA) 90 MG tablet tablet; Take 1 tablet by mouth 2 (Two) Times a Day. Indications: CAD POST PCI  Dispense: 60 tablet; Refill: 0  -     spironolactone (ALDACTONE) 25 MG tablet; Take 1 tablet by mouth Every Other Day.  Dispense: 45 tablet; Refill: 3          Follow Up     Return in about 4 months (around 1/10/2022) for with ekg and, ok for thursday if necessary.    Patient was given instructions and counseling regarding her condition or for health maintenance advice. Please see specific information pulled into the AVS if appropriate.

## 2021-09-15 NOTE — ASSESSMENT & PLAN NOTE
Coronary artery disease is stable and improving.  No further angina.  Shortness of breath still present some with Brilinta either episodes with exertion.

## 2021-09-15 NOTE — ASSESSMENT & PLAN NOTE
Lipid abnormalities are much improved and approaching goal of less than 60.  We will continue strict dietary management and atorvastatin 80 mg a day.  If by the time of next check we will restart your will add Zetia or change it to Crestor

## 2021-09-15 NOTE — ASSESSMENT & PLAN NOTE
Congestive heart failure due to coronary artery disease is improving.  She still has some shortness of breath and mild pedal edema with mild hypertension.  I am going to start spironolactone 25 mg every other day and have her do a 2-week blood pressure log starting next week

## 2021-10-20 ENCOUNTER — PATIENT MESSAGE (OUTPATIENT)
Dept: CARDIOLOGY | Facility: CLINIC | Age: 61
End: 2021-10-20

## 2021-10-22 ENCOUNTER — TELEPHONE (OUTPATIENT)
Dept: CARDIOLOGY | Facility: CLINIC | Age: 61
End: 2021-10-22

## 2021-10-22 ENCOUNTER — LAB (OUTPATIENT)
Dept: LAB | Facility: HOSPITAL | Age: 61
End: 2021-10-22

## 2021-10-22 DIAGNOSIS — I50.23 ACUTE ON CHRONIC SYSTOLIC CHF (CONGESTIVE HEART FAILURE) (HCC): Primary | ICD-10-CM

## 2021-10-22 DIAGNOSIS — E78.5 HYPERLIPIDEMIA LDL GOAL <70: ICD-10-CM

## 2021-10-22 DIAGNOSIS — I25.10 CORONARY ARTERY DISEASE INVOLVING NATIVE CORONARY ARTERY OF NATIVE HEART WITHOUT ANGINA PECTORIS: ICD-10-CM

## 2021-10-22 DIAGNOSIS — I10 ESSENTIAL HYPERTENSION: ICD-10-CM

## 2021-10-22 DIAGNOSIS — I50.21 ACUTE SYSTOLIC CONGESTIVE HEART FAILURE (HCC): ICD-10-CM

## 2021-10-22 LAB
ANION GAP SERPL CALCULATED.3IONS-SCNC: 13.2 MMOL/L (ref 5–15)
BUN SERPL-MCNC: 15 MG/DL (ref 8–23)
BUN/CREAT SERPL: 17 (ref 7–25)
CALCIUM SPEC-SCNC: 9.2 MG/DL (ref 8.6–10.5)
CHLORIDE SERPL-SCNC: 108 MMOL/L (ref 98–107)
CO2 SERPL-SCNC: 22.8 MMOL/L (ref 22–29)
CREAT SERPL-MCNC: 0.88 MG/DL (ref 0.57–1)
GFR SERPL CREATININE-BSD FRML MDRD: 65 ML/MIN/1.73
GLUCOSE SERPL-MCNC: 143 MG/DL (ref 65–99)
NT-PROBNP SERPL-MCNC: 148.1 PG/ML (ref 0–900)
POTASSIUM SERPL-SCNC: 4.1 MMOL/L (ref 3.5–5.2)
SODIUM SERPL-SCNC: 144 MMOL/L (ref 136–145)

## 2021-10-22 PROCEDURE — 36415 COLL VENOUS BLD VENIPUNCTURE: CPT

## 2021-10-22 PROCEDURE — 83880 ASSAY OF NATRIURETIC PEPTIDE: CPT

## 2021-10-22 PROCEDURE — 80048 BASIC METABOLIC PNL TOTAL CA: CPT

## 2021-10-22 NOTE — TELEPHONE ENCOUNTER
Received VM from patient.    Returned call. Patient stated that she is having worsening SOB that she is experiencing all the time. Stated that she is having slight BLE edema. Had labs completed today. Confirmed taking spironolactone 25 mg QOD. Patient is also on Brilinta.    Advised patient that I will discuss with Dr. Mckeon and will also have him review labs that he had done today. Advisde it may be Monday before I call her back.

## 2021-10-24 NOTE — TELEPHONE ENCOUNTER
CANDIS  brilinta.  Start with 4 tabs of plavix 75 mg on day one and subsequently 75 mg one a day.  Increase spironolactone to every day 25 mg.  BMP in 2-3 weeks.    Thanks

## 2021-10-25 RX ORDER — CLOPIDOGREL BISULFATE 75 MG/1
TABLET ORAL
Qty: 4 TABLET | Refills: 0 | Status: SHIPPED | OUTPATIENT
Start: 2021-10-25 | End: 2022-01-13

## 2021-10-25 RX ORDER — CLOPIDOGREL BISULFATE 75 MG/1
75 TABLET ORAL DAILY
Qty: 90 TABLET | Refills: 3 | Status: SHIPPED | OUTPATIENT
Start: 2021-10-25 | End: 2022-10-03

## 2021-10-25 RX ORDER — SPIRONOLACTONE 25 MG/1
25 TABLET ORAL DAILY
Qty: 90 TABLET | Refills: 3 | Status: SHIPPED | OUTPATIENT
Start: 2021-10-25 | End: 2022-12-05 | Stop reason: SDUPTHER

## 2021-11-05 ENCOUNTER — TELEPHONE (OUTPATIENT)
Dept: CARDIOLOGY | Facility: CLINIC | Age: 61
End: 2021-11-05

## 2021-11-05 NOTE — TELEPHONE ENCOUNTER
I have mailed a request to appeal patient's denial of authorization for her cath on 08/12/2021 to [facility] [employer] [address] [address] [facility] [id number]. The appeal can take up to 30 days for a determination. I will follow up

## 2021-11-12 ENCOUNTER — PATIENT MESSAGE (OUTPATIENT)
Dept: CARDIOLOGY | Facility: CLINIC | Age: 61
End: 2021-11-12

## 2021-11-19 ENCOUNTER — LAB (OUTPATIENT)
Dept: LAB | Facility: HOSPITAL | Age: 61
End: 2021-11-19

## 2021-11-19 DIAGNOSIS — I50.23 ACUTE ON CHRONIC SYSTOLIC CHF (CONGESTIVE HEART FAILURE) (HCC): ICD-10-CM

## 2021-11-19 LAB
ANION GAP SERPL CALCULATED.3IONS-SCNC: 10.2 MMOL/L (ref 5–15)
BUN SERPL-MCNC: 13 MG/DL (ref 8–23)
BUN/CREAT SERPL: 13.4 (ref 7–25)
CALCIUM SPEC-SCNC: 9.5 MG/DL (ref 8.6–10.5)
CHLORIDE SERPL-SCNC: 106 MMOL/L (ref 98–107)
CO2 SERPL-SCNC: 23.8 MMOL/L (ref 22–29)
CREAT SERPL-MCNC: 0.97 MG/DL (ref 0.57–1)
GFR SERPL CREATININE-BSD FRML MDRD: 58 ML/MIN/1.73
GLUCOSE SERPL-MCNC: 140 MG/DL (ref 65–99)
POTASSIUM SERPL-SCNC: 4 MMOL/L (ref 3.5–5.2)
SODIUM SERPL-SCNC: 140 MMOL/L (ref 136–145)

## 2021-11-19 PROCEDURE — 36415 COLL VENOUS BLD VENIPUNCTURE: CPT

## 2021-11-19 PROCEDURE — 80048 BASIC METABOLIC PNL TOTAL CA: CPT

## 2021-11-23 ENCOUNTER — TELEPHONE (OUTPATIENT)
Dept: CARDIOLOGY | Facility: CLINIC | Age: 61
End: 2021-11-23

## 2021-11-23 NOTE — TELEPHONE ENCOUNTER
Received VM from patient.    SW patient. Patient with question if should go to cardiac rehab due to COVID. Explained to patient it would be beneficial. Patient voiced understanding.

## 2021-11-24 ENCOUNTER — TELEPHONE (OUTPATIENT)
Dept: CARDIOLOGY | Facility: CLINIC | Age: 61
End: 2021-11-24

## 2021-11-24 NOTE — TELEPHONE ENCOUNTER
----- Message from Dc Mckeon MD sent at 11/21/2021  2:42 PM EST -----  Please call them and let them know that labs are stable.

## 2022-01-05 ENCOUNTER — LAB (OUTPATIENT)
Dept: LAB | Facility: HOSPITAL | Age: 62
End: 2022-01-05

## 2022-01-05 DIAGNOSIS — E78.5 HYPERLIPIDEMIA LDL GOAL <70: ICD-10-CM

## 2022-01-05 DIAGNOSIS — I10 ESSENTIAL HYPERTENSION: ICD-10-CM

## 2022-01-05 DIAGNOSIS — I25.10 CORONARY ARTERY DISEASE INVOLVING NATIVE CORONARY ARTERY OF NATIVE HEART WITHOUT ANGINA PECTORIS: ICD-10-CM

## 2022-01-05 DIAGNOSIS — I50.21 ACUTE SYSTOLIC CONGESTIVE HEART FAILURE: ICD-10-CM

## 2022-01-05 LAB
ALBUMIN SERPL-MCNC: 4.6 G/DL (ref 3.5–5.2)
ALBUMIN/GLOB SERPL: 2.1 G/DL
ALP SERPL-CCNC: 119 U/L (ref 39–117)
ALT SERPL W P-5'-P-CCNC: 21 U/L (ref 1–33)
ANION GAP SERPL CALCULATED.3IONS-SCNC: 12.9 MMOL/L (ref 5–15)
AST SERPL-CCNC: 17 U/L (ref 1–32)
BILIRUB SERPL-MCNC: 0.4 MG/DL (ref 0–1.2)
BUN SERPL-MCNC: 13 MG/DL (ref 8–23)
BUN/CREAT SERPL: 13.5 (ref 7–25)
CALCIUM SPEC-SCNC: 9.1 MG/DL (ref 8.6–10.5)
CHLORIDE SERPL-SCNC: 111 MMOL/L (ref 98–107)
CHOLEST SERPL-MCNC: 141 MG/DL (ref 0–200)
CO2 SERPL-SCNC: 22.1 MMOL/L (ref 22–29)
CREAT SERPL-MCNC: 0.96 MG/DL (ref 0.57–1)
GFR SERPL CREATININE-BSD FRML MDRD: 59 ML/MIN/1.73
GLOBULIN UR ELPH-MCNC: 2.2 GM/DL
GLUCOSE SERPL-MCNC: 118 MG/DL (ref 65–99)
HDLC SERPL-MCNC: 57 MG/DL (ref 40–60)
LDLC SERPL CALC-MCNC: 63 MG/DL (ref 0–100)
LDLC/HDLC SERPL: 1.06 {RATIO}
MAGNESIUM SERPL-MCNC: 2.1 MG/DL (ref 1.6–2.4)
NT-PROBNP SERPL-MCNC: 90 PG/ML (ref 0–900)
POTASSIUM SERPL-SCNC: 4.3 MMOL/L (ref 3.5–5.2)
PROT SERPL-MCNC: 6.8 G/DL (ref 6–8.5)
SODIUM SERPL-SCNC: 146 MMOL/L (ref 136–145)
TRIGL SERPL-MCNC: 118 MG/DL (ref 0–150)
VLDLC SERPL-MCNC: 21 MG/DL (ref 5–40)

## 2022-01-05 PROCEDURE — 83735 ASSAY OF MAGNESIUM: CPT

## 2022-01-05 PROCEDURE — 80053 COMPREHEN METABOLIC PANEL: CPT

## 2022-01-05 PROCEDURE — 80061 LIPID PANEL: CPT

## 2022-01-05 PROCEDURE — 83880 ASSAY OF NATRIURETIC PEPTIDE: CPT

## 2022-01-05 PROCEDURE — 36415 COLL VENOUS BLD VENIPUNCTURE: CPT

## 2022-01-13 ENCOUNTER — OFFICE VISIT (OUTPATIENT)
Dept: CARDIOLOGY | Facility: CLINIC | Age: 62
End: 2022-01-13

## 2022-01-13 VITALS
SYSTOLIC BLOOD PRESSURE: 134 MMHG | BODY MASS INDEX: 35.99 KG/M2 | HEIGHT: 65 IN | WEIGHT: 216 LBS | DIASTOLIC BLOOD PRESSURE: 72 MMHG | HEART RATE: 78 BPM

## 2022-01-13 DIAGNOSIS — E78.5 HYPERLIPIDEMIA LDL GOAL <70: ICD-10-CM

## 2022-01-13 DIAGNOSIS — I50.21 ACUTE SYSTOLIC CONGESTIVE HEART FAILURE: ICD-10-CM

## 2022-01-13 DIAGNOSIS — I10 ESSENTIAL HYPERTENSION: Primary | ICD-10-CM

## 2022-01-13 DIAGNOSIS — I25.10 CORONARY ARTERY DISEASE INVOLVING NATIVE CORONARY ARTERY OF NATIVE HEART WITHOUT ANGINA PECTORIS: ICD-10-CM

## 2022-01-13 PROCEDURE — 93000 ELECTROCARDIOGRAM COMPLETE: CPT | Performed by: INTERNAL MEDICINE

## 2022-01-13 PROCEDURE — 99214 OFFICE O/P EST MOD 30 MIN: CPT | Performed by: INTERNAL MEDICINE

## 2022-01-13 RX ORDER — SIMETHICONE 180 MG
180 CAPSULE ORAL 2 TIMES DAILY
COMMUNITY
Start: 2021-12-27 | End: 2022-04-13 | Stop reason: SDUPTHER

## 2022-01-13 RX ORDER — LANSOPRAZOLE 30 MG/1
30 CAPSULE, DELAYED RELEASE ORAL DAILY
COMMUNITY
Start: 2021-12-27 | End: 2022-04-13

## 2022-01-13 RX ORDER — BUPROPION HYDROCHLORIDE 150 MG/1
150 TABLET ORAL DAILY
COMMUNITY
End: 2022-04-13

## 2022-01-13 NOTE — ASSESSMENT & PLAN NOTE
Lipid abnormalities are at goal and much improved .  she will continueThe atorvastatin at 80 mg a day.

## 2022-01-13 NOTE — PROGRESS NOTES
Office Visit    Chief Complaint  Hypertension, Hyperlipidemia, Coronary Artery Disease, and Congestive Heart Failure    Subjective            Carley Deng presents to [facility] CARDIOLOGY  Carley is a 61 years old female who is status post anterior wall myocardial infarction and acute systolic heart failure who is doing a lot better.  She does not denies any definite chest pain.  She denies palpitations dizziness syncope.  Her shortness of breath on exertion is much improved but she has not been able to find time to exercise.  I have strongly urged her to find time and exercise regularly.      Past Medical History:   Diagnosis Date   • Abnormal ECG    • Anxiety    • Benign essential tremor    • CHF (congestive heart failure) (Formerly [facility])    • Coronary artery disease    • Depression    • Hyperlipemia    • Hypertension    • Limb swelling    • Lumbago    • Myocardial infarction (Formerly [facility])    • Seasonal allergies        Allergies   Allergen Reactions   • Eggs Or Egg-Derived Products Anaphylaxis   • Erythromycin Unknown - High Severity        Past Surgical History:   Procedure Laterality Date   • CARDIAC CATHETERIZATION Left 2021    Procedure: Left Heart Cath with coronary angiography and possible right heart cath. Possible PTCA/stent Insertion.;  Surgeon: Dc Mckeon MD;  Location: [facility] INVASIVE LOCATION;  Service: Cardiovascular;  Laterality: Left;   •  SECTION     • CHOLECYSTECTOMY     • COLONOSCOPY     • CORONARY STENT PLACEMENT     • ENDOSCOPY     • GALLBLADDER SURGERY     • HYSTERECTOMY     • HYSTERECTOMY          Social History     Tobacco Use   • Smoking status: Never Smoker   • Smokeless tobacco: Never Used   Vaping Use   • Vaping Use: Never used   Substance Use Topics   • Alcohol use: Never   • Drug use: Never       Family History   Problem Relation Age of Onset   • Coronary artery disease Mother    • Coronary artery disease Father           Current Outpatient  "Medications:   •  atorvastatin (LIPITOR) 80 MG tablet, Take 1 tablet by mouth Every Night., Disp: 30 tablet, Rfl: 0  •  buPROPion XL (WELLBUTRIN XL) 150 MG 24 hr tablet, Take 150 mg by mouth Daily., Disp: , Rfl:   •  carvedilol (COREG) 6.25 MG tablet, Take 1 tablet by mouth 2 (Two) Times a Day With Meals for 360 days., Disp: 180 tablet, Rfl: 3  •  clopidogrel (PLAVIX) 75 MG tablet, Take 1 tablet by mouth Daily., Disp: 90 tablet, Rfl: 3  •  irbesartan (AVAPRO) 300 MG tablet, Take 300 mg by mouth Every Night., Disp: , Rfl:   •  lansoprazole (PREVACID) 30 MG capsule, Take 30 mg by mouth Daily., Disp: , Rfl:   •  nitroglycerin (NITROSTAT) 0.4 MG SL tablet, 1 under the tongue as needed for angina, may repeat q5mins for up three doses, Disp: 100 tablet, Rfl: 11  •  simethicone (MYLICON,GAS-X) 180 MG capsule, Take 180 mg by mouth 2 (Two) Times a Day., Disp: , Rfl:   •  spironolactone (ALDACTONE) 25 MG tablet, Take 1 tablet by mouth Daily., Disp: 90 tablet, Rfl: 3  •  carvedilol (COREG) 6.25 MG tablet, Take 1 tablet by mouth 2 (Two) Times a Day With Meals for 30 days., Disp: 60 tablet, Rfl: 0  •  clopidogrel (PLAVIX) 75 MG tablet, Take 4 tablets by mouth x 1 dose., Disp: 4 tablet, Rfl: 0     Medications Discontinued During This Encounter   Medication Reason   • citalopram (CeleXA) 20 MG tablet Discontinued by another clinician        Review of Systems   Constitutional: Positive for fatigue.   Respiratory: Negative for cough and shortness of breath.    Cardiovascular: Negative for chest pain, palpitations and leg swelling.   Neurological: Negative for dizziness.        Objective     /72   Pulse 78   Ht 165.1 cm (65\")   Wt 98 kg (216 lb)   BMI 35.94 kg/m²       Physical Exam  Constitutional:       General: She is awake.      Appearance: Normal appearance.   Neck:      Thyroid: No thyromegaly.      Vascular: No carotid bruit or JVD.   Cardiovascular:      Rate and Rhythm: Normal rate and regular rhythm.      Chest " Wall: PMI is not displaced.      Pulses: Normal pulses.      Heart sounds: Normal heart sounds, S1 normal and S2 normal. No murmur heard.  No friction rub. No gallop. No S3 or S4 sounds.    Pulmonary:      Effort: Pulmonary effort is normal.      Breath sounds: Normal breath sounds and air entry. No wheezing, rhonchi or rales.   Abdominal:      General: Bowel sounds are normal.      Palpations: Abdomen is soft. There is no mass.      Tenderness: There is no abdominal tenderness.   Musculoskeletal:      Cervical back: Neck supple.      Right lower leg: No edema.      Left lower leg: No edema.   Neurological:      Mental Status: She is alert and oriented to person, place, and time.   Psychiatric:         Mood and Affect: Mood normal.         Behavior: Behavior is cooperative.           Result Review :                    ECG 12 Lead    Date/Time: 1/13/2022 2:54 PM  Performed by: Dc Mckeon MD  Authorized by: Dc Mckeon MD   Comments: Sinus rhythm normal axis slightly prolonged QTC no other abnormality noted.  Compared to the EKG of August 2021 the anterolateral T wave changes have completely resolved and the QTC is shorter                Assessment and Plan        Diagnoses and all orders for this visit:    1. Essential hypertension (Primary)  -     Lipid Panel; Future  -     Comprehensive Metabolic Panel; Future  -     Magnesium; Future  -     CBC & Differential; Future    2. Hyperlipidemia LDL goal <70  Assessment & Plan:  Lipid abnormalities are at goal and much improved .  she will continueThe atorvastatin at 80 mg a day.    Orders:  -     Lipid Panel; Future  -     Comprehensive Metabolic Panel; Future  -     Magnesium; Future  -     CBC & Differential; Future    3. Coronary artery disease involving native coronary artery of native heart without angina pectoris  -     Lipid Panel; Future  -     Comprehensive Metabolic Panel; Future  -     Magnesium; Future  -     CBC & Differential; Future    4. Acute  systolic congestive heart failure (HCC)  Assessment & Plan:  Her acute systolic heart failure is almost completely resolved now.    Orders:  -     Lipid Panel; Future  -     Comprehensive Metabolic Panel; Future  -     Magnesium; Future  -     CBC & Differential; Future    Other orders  -     ECG 12 Lead    Other labs look excellent, her lipids are at goal and her blood pressure at home is well controlled.  I have encouraged her to exercise regularly and lose weight.  She will continue her current medications in the current dosage and follow-up in 6 months      Follow Up     Return for with maricruz willist..    Patient was given instructions and counseling regarding her condition or for health maintenance advice. Please see specific information pulled into the AVS if appropriate.     Dc Mckeon MD  01/13/22 12:30 EST

## 2022-03-25 ENCOUNTER — TRANSCRIBE ORDERS (OUTPATIENT)
Dept: ADMINISTRATIVE | Facility: HOSPITAL | Age: 62
End: 2022-03-25

## 2022-03-25 DIAGNOSIS — Z12.31 VISIT FOR SCREENING MAMMOGRAM: Primary | ICD-10-CM

## 2022-04-13 ENCOUNTER — TELEPHONE (OUTPATIENT)
Dept: GASTROENTEROLOGY | Facility: CLINIC | Age: 62
End: 2022-04-13

## 2022-04-13 ENCOUNTER — OFFICE VISIT (OUTPATIENT)
Dept: GASTROENTEROLOGY | Facility: CLINIC | Age: 62
End: 2022-04-13

## 2022-04-13 VITALS
DIASTOLIC BLOOD PRESSURE: 50 MMHG | SYSTOLIC BLOOD PRESSURE: 123 MMHG | HEIGHT: 65 IN | WEIGHT: 208 LBS | BODY MASS INDEX: 34.66 KG/M2

## 2022-04-13 DIAGNOSIS — K58.0 IRRITABLE BOWEL SYNDROME WITH DIARRHEA: ICD-10-CM

## 2022-04-13 DIAGNOSIS — T78.1XXA FOOD SENSITIVITY WITH GASTROINTESTINAL SYMPTOMS: ICD-10-CM

## 2022-04-13 DIAGNOSIS — R14.0 ABDOMINAL BLOATING: ICD-10-CM

## 2022-04-13 DIAGNOSIS — R10.13 EPIGASTRIC PAIN: ICD-10-CM

## 2022-04-13 DIAGNOSIS — R19.7 DIARRHEA, UNSPECIFIED TYPE: Primary | ICD-10-CM

## 2022-04-13 PROCEDURE — 99214 OFFICE O/P EST MOD 30 MIN: CPT | Performed by: NURSE PRACTITIONER

## 2022-04-13 RX ORDER — LANSOPRAZOLE 30 MG/1
30 CAPSULE, DELAYED RELEASE ORAL DAILY
COMMUNITY
Start: 2021-08-11 | End: 2022-08-23

## 2022-04-13 RX ORDER — SIMETHICONE 125 MG
125 CAPSULE ORAL
COMMUNITY
Start: 2021-12-27

## 2022-04-13 RX ORDER — ERGOCALCIFEROL 1.25 MG/1
50000 CAPSULE ORAL WEEKLY
COMMUNITY
Start: 2022-03-25

## 2022-04-13 RX ORDER — DICYCLOMINE HYDROCHLORIDE 10 MG/1
10 CAPSULE ORAL 4 TIMES DAILY PRN
COMMUNITY
Start: 2022-03-25

## 2022-04-13 RX ORDER — SOD SULF/POT CHLORIDE/MAG SULF 1.479 G
12 TABLET ORAL TAKE AS DIRECTED
Qty: 24 TABLET | Refills: 0 | Status: SHIPPED | OUTPATIENT
Start: 2022-04-13 | End: 2022-08-12 | Stop reason: SDUPTHER

## 2022-04-13 NOTE — PROGRESS NOTES
Patient Name: Carley Deng   Visit Date: 04/13/2022   Patient ID: [id number]  Provider: SRAVANTHI Urbina    Sex: female  Location:  Location Address:  Location Phone: [address] [place] [address] [phone]    YOB: 1960      Primary Care Provider Robb Davalos MD      Referring Provider: Robb Davalos MD        Chief Complaint  No chief complaint on file.    History of Present Illness  Carley Deng is a 61 y.o. who presents to [facility] GASTROENTEROLOGY on referral from Robb Davalos MD for a gastroenterology evaluation of No chief complaint on file.     Ms. Deng reports she had a heart attack last August. During that time she had been having a lot of epigastric pain that would radiate to her RUQ and abdominal bloating.  After she was diagnosed with a heart attack she felt that maybe her symptoms had been cardiac related however she did continue to have the symptoms several months after stent placement.  Sensitivity to lactose.  Recently  has changed her diet due to starting weight watchers and she has noticed a decrease in symptoms however still concerned that something else may be going on.  Recalls being told in the past that she had a hiatal hernia however has not had a  EGD in over 10 years.  Denies any heartburn, nausea, vomiting, or dysphagia.  Currently taking a PPI daily.  Appetite and weight stable.    Patient also reports diarrhea as she has a bowel movement 1-3 times daily.  Stool alternates formed to loose stool.  Stress increases episodes of diarrhea.  Reports history of IBS-D diagnosis many years ago.  Last colonoscopy was in 1990, unsure of results.  Denies any hematochezia or melena.    Currently taking Plavix due to recent cardiac infarction and stent placement.    Labs Result Review Imaging    Past Medical History:   Diagnosis Date   • Abnormal ECG    • Anxiety    • Benign essential tremor    • CHF (congestive heart  failure) (HCC)    • Coronary artery disease    • Depression    • Hyperlipemia    • Hypertension    • Limb swelling    • Lumbago    • Myocardial infarction (HCC)    • Seasonal allergies        Past Surgical History:   Procedure Laterality Date   • CARDIAC CATHETERIZATION Left 2021    Procedure: Left Heart Cath with coronary angiography and possible right heart cath. Possible PTCA/stent Insertion.;  Surgeon: Dc Mckeon MD;  Location: [facility] INVASIVE LOCATION;  Service: Cardiovascular;  Laterality: Left;   •  SECTION     • CHOLECYSTECTOMY     • COLONOSCOPY     • CORONARY STENT PLACEMENT     • ENDOSCOPY     • GALLBLADDER SURGERY     • HYSTERECTOMY     • HYSTERECTOMY           Current Outpatient Medications:   •  lansoprazole (Prevacid) 30 MG capsule, , Disp: , Rfl:   •  simethicone (Gas-X Extra Strength) 125 MG capsule capsule, 125 mg., Disp: , Rfl:   •  atorvastatin (LIPITOR) 80 MG tablet, Take 1 tablet by mouth Every Night., Disp: 30 tablet, Rfl: 0  •  carvedilol (COREG) 6.25 MG tablet, Take 1 tablet by mouth 2 (Two) Times a Day With Meals for 30 days., Disp: 60 tablet, Rfl: 0  •  clopidogrel (PLAVIX) 75 MG tablet, Take 1 tablet by mouth Daily., Disp: 90 tablet, Rfl: 3  •  dicyclomine (BENTYL) 10 MG capsule, 10 mg., Disp: , Rfl:   •  irbesartan (AVAPRO) 300 MG tablet, Take 300 mg by mouth Every Night., Disp: , Rfl:   •  nitroglycerin (NITROSTAT) 0.4 MG SL tablet, 1 under the tongue as needed for angina, may repeat q5mins for up three doses, Disp: 100 tablet, Rfl: 11  •  Sodium Sulfate-Mag Sulfate-KCl (Sutab) 6801-425-330 MG tablet, Take 12 tablets by mouth Take As Directed. Take 12 tablets at 6 pm and 12 tablets 4 hours prior to procedure., Disp: 24 tablet, Rfl: 0  •  spironolactone (ALDACTONE) 25 MG tablet, Take 1 tablet by mouth Daily., Disp: 90 tablet, Rfl: 3  •  vitamin D (ERGOCALCIFEROL) 1.25 MG (02379 UT) capsule capsule, Take 50,000 Units by mouth 1 (One) Time Per Week., Disp:  ", Rfl:      Allergies   Allergen Reactions   • Eggs Or Egg-Derived Products Anaphylaxis   • Erythromycin Unknown - High Severity       Family History   Problem Relation Age of Onset   • Coronary artery disease Mother    • Coronary artery disease Father         Social History     Social History Narrative   • Not on file         Objective     Review of Systems   Constitutional: Negative for appetite change and unexpected weight loss.   Gastrointestinal: Positive for abdominal distention, abdominal pain and diarrhea.        Vital Signs:   /50   Ht 165.1 cm (65\")   Wt 94.3 kg (208 lb)   BMI 34.61 kg/m²       Physical Exam  Constitutional:       General: She is not in acute distress.     Appearance: Normal appearance. She is well-developed and normal weight.   HENT:      Head: Normocephalic and atraumatic.   Eyes:      Conjunctiva/sclera: Conjunctivae normal.      Pupils: Pupils are equal, round, and reactive to light.      Visual Fields: Right eye visual fields normal and left eye visual fields normal.   Cardiovascular:      Rate and Rhythm: Normal rate and regular rhythm.      Heart sounds: Normal heart sounds.   Pulmonary:      Effort: Pulmonary effort is normal. No retractions.      Breath sounds: Normal breath sounds and air entry.   Abdominal:      General: Bowel sounds are normal. There is no distension.      Palpations: Abdomen is soft.      Tenderness: There is no abdominal tenderness.      Comments: No appreciable hepatosplenomegaly or ascites   Musculoskeletal:         General: Normal range of motion.      Cervical back: Normal range of motion and neck supple.   Skin:     General: Skin is warm and dry.   Neurological:      Mental Status: She is alert and oriented to person, place, and time.   Psychiatric:         Mood and Affect: Mood and affect normal.         Behavior: Behavior normal.         Result Review :   The following data was reviewed by: SRAVANTHI Urbina on 04/13/2022:  CBC w/diff  "   CBC w/Diff 8/12/21 8/13/21 9/8/21   WBC 8.06 10.04 11.18 (A)   RBC 4.60 4.29 4.59   Hemoglobin 14.3 13.2 14.3   Hematocrit 41.2 39.7 41.9   MCV 89.6 92.5 91.3   MCH 31.1 30.8 31.2   MCHC 34.7 33.2 34.1   RDW 13.0 13.3 13.1   Platelets 238 217 282   Neutrophil Rel % 79.7 (A) 85.6 (A)    Immature Granulocyte Rel % 0.2 0.4    Lymphocyte Rel % 11.9 (A) 6.9 (A)    Monocyte Rel % 6.7 6.0    Eosinophil Rel % 1.1 0.7    Basophil Rel % 0.4 0.4    (A) Abnormal value            CMP    CMP 10/22/21 11/19/21 1/5/22   Glucose 143 (A) 140 (A) 118 (A)   BUN 15 13 13   Creatinine 0.88 0.97 0.96   eGFR Non  Am 65 58 (A) 59 (A)   Sodium 144 140 146 (A)   Potassium 4.1 4.0 4.3   Chloride 108 (A) 106 111 (A)   Calcium 9.2 9.5 9.1   Albumin   4.60   Total Bilirubin   0.4   Alkaline Phosphatase   119 (A)   AST (SGOT)   17   ALT (SGPT)   21   (A) Abnormal value            Protime   Date Value Ref Range Status   08/12/2021 10.6 9.4 - 12.0 Seconds Final     INR   Date Value Ref Range Status   08/12/2021 0.96 (L) 2.00 - 3.00 Final             Assessment and Plan    Diagnoses and all orders for this visit:    1. Diarrhea, unspecified type (Primary)  -     Case Request; Standing  -     Case Request  -     Clostridium Difficile Toxin - Stool, Per Rectum; Future  -     Enteric Bacterial Panel - Stool, Per Rectum; Future  -     Enteric Parasite Panel - Stool, Per Rectum; Future  -     Calprotectin, Fecal - Stool, Per Rectum; Future  -     Pancreatic Elastase, Fecal - Stool, Per Rectum; Future  -     Food Allergy Profile; Future    2. Irritable bowel syndrome with diarrhea  -     Case Request; Standing  -     Case Request    3. Abdominal bloating  -     Case Request; Standing  -     Case Request    4. Epigastric pain  -     Case Request; Standing  -     Case Request    5. Food sensitivity with gastrointestinal symptoms  -     Food Allergy Profile; Future    Other orders  -     Follow Anesthesia Guidelines / Protocol; Future  -     Obtain  Informed Consent; Future  -     Sodium Sulfate-Mag Sulfate-KCl (Sutab) 3960-724-592 MG tablet; Take 12 tablets by mouth Take As Directed. Take 12 tablets at 6 pm and 12 tablets 4 hours prior to procedure.  Dispense: 24 tablet; Refill: 0      ESOPHAGOGASTRODUODENOSCOPY (N/A), COLONOSCOPY (N/A)       Follow Up   Return for Follow up after procedure.  Patient was given instructions and counseling regarding her condition or for health maintenance advice. Please see specific information pulled into the AVS if appropriate.

## 2022-04-13 NOTE — TELEPHONE ENCOUNTER
Blood Thinner/Cardiac Clearance                                                                                                                 [facility]    4/13/2022    Dear Dr Mckeon,     Patient: Carley Deng   YOB: 1960        This patient is waiting to have a Colonoscopy and/or Esophagogastroduodenoscopy which I will perform at [facility].     Our records indicate this patient is currently taking PLAVIX. This procedure requires the patient to suspend their anticoagulant medication prior to surgery.     Please respond to this request noting your recommendations. You may contact our office at [phone] Option 3 with any questions. I appreciate your prompt response in this matter.     Please return this form to our office as soon as possible to [phone]    ____ I approve my patient to stop taking their Anticoagulant Therapy medication 5 days prior to the scheduled procedure.    ____ I do NOT approve my patient to stop taking their Anticoagulant Therapy medication at this time.    ____ I approve my patient from a cardiac standpoint.    ____ I do NOT approve my patient from a cardiac standpoint at this time.    Approving physician name (please print):     _____________________________________________    Approving physician signature:     ________________________________    Date:________________      Sincerely,  [facility]   Gastroenterology - Dr.Laura Morales

## 2022-04-13 NOTE — TELEPHONE ENCOUNTER
Procedure: Colonoscopy and/or EGD    Med Directive: Plavix    PMH: CAD s/p PCI 08/2021; HTN; HLD; CHF    Last Seen: 01/13/2022

## 2022-04-15 PROBLEM — R19.7 DIARRHEA: Status: ACTIVE | Noted: 2022-04-15

## 2022-04-15 PROBLEM — K58.0 IRRITABLE BOWEL SYNDROME WITH DIARRHEA: Status: ACTIVE | Noted: 2022-04-15

## 2022-04-15 PROBLEM — R14.0 ABDOMINAL BLOATING: Status: ACTIVE | Noted: 2022-04-15

## 2022-04-15 PROBLEM — R10.13 EPIGASTRIC PAIN: Status: ACTIVE | Noted: 2022-04-15

## 2022-04-18 ENCOUNTER — LAB (OUTPATIENT)
Dept: LAB | Facility: HOSPITAL | Age: 62
End: 2022-04-18

## 2022-04-18 DIAGNOSIS — R19.7 DIARRHEA, UNSPECIFIED TYPE: ICD-10-CM

## 2022-04-18 DIAGNOSIS — T78.1XXA FOOD SENSITIVITY WITH GASTROINTESTINAL SYMPTOMS: ICD-10-CM

## 2022-04-18 LAB
027 TOXIN: NORMAL
C DIFF TOX GENS STL QL NAA+PROBE: NEGATIVE

## 2022-04-18 PROCEDURE — 86003 ALLG SPEC IGE CRUDE XTRC EA: CPT

## 2022-04-18 PROCEDURE — 36415 COLL VENOUS BLD VENIPUNCTURE: CPT

## 2022-04-18 PROCEDURE — 87506 IADNA-DNA/RNA PROBE TQ 6-11: CPT

## 2022-04-18 PROCEDURE — 83993 ASSAY FOR CALPROTECTIN FECAL: CPT

## 2022-04-18 PROCEDURE — 82653 EL-1 FECAL QUANTITATIVE: CPT

## 2022-04-18 PROCEDURE — 87493 C DIFF AMPLIFIED PROBE: CPT

## 2022-04-19 ENCOUNTER — TELEPHONE (OUTPATIENT)
Dept: CARDIOLOGY | Facility: CLINIC | Age: 62
End: 2022-04-19

## 2022-04-19 DIAGNOSIS — E78.5 HYPERLIPIDEMIA LDL GOAL <70: Primary | ICD-10-CM

## 2022-04-19 LAB
C COLI+JEJ+UPSA DNA STL QL NAA+NON-PROBE: NOT DETECTED
CRYPTOSP DNA STL QL NAA+NON-PROBE: NOT DETECTED
E HISTOLYT DNA STL QL NAA+NON-PROBE: NOT DETECTED
EC STX1+STX2 GENES STL QL NAA+NON-PROBE: NOT DETECTED
G LAMBLIA DNA STL QL NAA+NON-PROBE: NOT DETECTED
S ENT+BONG DNA STL QL NAA+NON-PROBE: NOT DETECTED
SHIGELLA SP+EIEC IPAH ST NAA+NON-PROBE: NOT DETECTED

## 2022-04-21 LAB
CALPROTECTIN STL-MCNT: 25 UG/G (ref 0–120)
ELASTASE PANC STL-MCNT: 245 UG ELAST./G

## 2022-04-25 LAB
CLAM IGE QN: <0.1 KU/L
CODFISH IGE QN: <0.1 KU/L
CONV CLASS DESCRIPTION: NORMAL
CORN IGE QN: <0.1 KU/L
COW MILK IGE QN: <0.1 KU/L
EGG WHITE IGE QN: <0.1 KU/L
PEANUT IGE QN: <0.1 KU/L
SCALLOP IGE QN: <0.1 KU/L
SESAME SEED IGE QN: <0.1 KU/L
SHRIMP IGE QN: <0.1 KU/L
SOYBEAN IGE QN: <0.1 KU/L
WALNUT IGE QN: <0.1 KU/L
WHEAT IGE QN: <0.1 KU/L

## 2022-04-26 ENCOUNTER — TELEPHONE (OUTPATIENT)
Dept: CARDIOLOGY | Facility: CLINIC | Age: 62
End: 2022-04-26

## 2022-04-26 NOTE — TELEPHONE ENCOUNTER
Pt is wanting to have labs on Friday- she and her  have appts in May but said the lab stated that they weren't due until June. Could you update the start/ est dates?

## 2022-04-29 ENCOUNTER — LAB (OUTPATIENT)
Dept: LAB | Facility: HOSPITAL | Age: 62
End: 2022-04-29

## 2022-04-29 ENCOUNTER — APPOINTMENT (OUTPATIENT)
Dept: MAMMOGRAPHY | Facility: HOSPITAL | Age: 62
End: 2022-04-29

## 2022-04-29 DIAGNOSIS — E78.5 HYPERLIPIDEMIA LDL GOAL <70: ICD-10-CM

## 2022-04-29 LAB
ALBUMIN SERPL-MCNC: 3.9 G/DL (ref 3.5–5.2)
ALBUMIN/GLOB SERPL: 1.2 G/DL
ALP SERPL-CCNC: 128 U/L (ref 39–117)
ALT SERPL W P-5'-P-CCNC: 22 U/L (ref 1–33)
ANION GAP SERPL CALCULATED.3IONS-SCNC: 10.2 MMOL/L (ref 5–15)
AST SERPL-CCNC: 17 U/L (ref 1–32)
BILIRUB SERPL-MCNC: 0.5 MG/DL (ref 0–1.2)
BUN SERPL-MCNC: 15 MG/DL (ref 8–23)
BUN/CREAT SERPL: 13.5 (ref 7–25)
CALCIUM SPEC-SCNC: 9.6 MG/DL (ref 8.6–10.5)
CHLORIDE SERPL-SCNC: 105 MMOL/L (ref 98–107)
CHOLEST SERPL-MCNC: 151 MG/DL (ref 0–200)
CO2 SERPL-SCNC: 25.8 MMOL/L (ref 22–29)
CREAT SERPL-MCNC: 1.11 MG/DL (ref 0.57–1)
EGFRCR SERPLBLD CKD-EPI 2021: 56 ML/MIN/1.73
GLOBULIN UR ELPH-MCNC: 3.2 GM/DL
GLUCOSE SERPL-MCNC: 117 MG/DL (ref 65–99)
HDLC SERPL-MCNC: 51 MG/DL (ref 40–60)
LDLC SERPL CALC-MCNC: 80 MG/DL (ref 0–100)
LDLC/HDLC SERPL: 1.53 {RATIO}
POTASSIUM SERPL-SCNC: 4.2 MMOL/L (ref 3.5–5.2)
PROT SERPL-MCNC: 7.1 G/DL (ref 6–8.5)
SODIUM SERPL-SCNC: 141 MMOL/L (ref 136–145)
TRIGL SERPL-MCNC: 111 MG/DL (ref 0–150)
VLDLC SERPL-MCNC: 20 MG/DL (ref 5–40)

## 2022-04-29 PROCEDURE — 80053 COMPREHEN METABOLIC PANEL: CPT

## 2022-04-29 PROCEDURE — 36415 COLL VENOUS BLD VENIPUNCTURE: CPT

## 2022-04-29 PROCEDURE — 80061 LIPID PANEL: CPT

## 2022-05-02 ENCOUNTER — OFFICE VISIT (OUTPATIENT)
Dept: CARDIOLOGY | Facility: CLINIC | Age: 62
End: 2022-05-02

## 2022-05-02 VITALS
BODY MASS INDEX: 34.82 KG/M2 | HEART RATE: 65 BPM | DIASTOLIC BLOOD PRESSURE: 70 MMHG | WEIGHT: 209 LBS | HEIGHT: 65 IN | SYSTOLIC BLOOD PRESSURE: 128 MMHG

## 2022-05-02 DIAGNOSIS — I10 ESSENTIAL HYPERTENSION: ICD-10-CM

## 2022-05-02 DIAGNOSIS — I25.10 CORONARY ARTERY DISEASE INVOLVING NATIVE CORONARY ARTERY OF NATIVE HEART WITHOUT ANGINA PECTORIS: Primary | ICD-10-CM

## 2022-05-02 DIAGNOSIS — E78.5 HYPERLIPIDEMIA LDL GOAL <70: ICD-10-CM

## 2022-05-02 PROBLEM — I50.21 ACUTE SYSTOLIC CONGESTIVE HEART FAILURE: Status: RESOLVED | Noted: 2021-08-26 | Resolved: 2022-05-02

## 2022-05-02 PROCEDURE — 99214 OFFICE O/P EST MOD 30 MIN: CPT | Performed by: INTERNAL MEDICINE

## 2022-05-02 RX ORDER — IRBESARTAN 300 MG/1
300 TABLET ORAL NIGHTLY
Qty: 90 TABLET | Refills: 3 | Status: SHIPPED | OUTPATIENT
Start: 2022-05-02

## 2022-05-02 RX ORDER — CARVEDILOL 6.25 MG/1
6.25 TABLET ORAL 2 TIMES DAILY WITH MEALS
Qty: 180 TABLET | Refills: 3 | Status: SHIPPED | OUTPATIENT
Start: 2022-05-02 | End: 2022-12-05

## 2022-05-02 RX ORDER — ATORVASTATIN CALCIUM 80 MG/1
80 TABLET, FILM COATED ORAL NIGHTLY
Qty: 90 TABLET | Refills: 3 | Status: CANCELLED | OUTPATIENT
Start: 2022-05-02

## 2022-05-02 RX ORDER — ROSUVASTATIN CALCIUM 40 MG/1
40 TABLET, COATED ORAL DAILY
Qty: 90 TABLET | Refills: 3 | Status: SHIPPED | OUTPATIENT
Start: 2022-05-02

## 2022-05-02 NOTE — ASSESSMENT & PLAN NOTE
Her lipids are not at goal.  Her LDL has gone despite 80 mg of Lipitor.  I have suggested to her that she should try Crestor 40 mg and if that does not regulate her LDL then we will add Zetia or switch her to a PCSK9 inhibitor.  She is agreeable.

## 2022-05-02 NOTE — ASSESSMENT & PLAN NOTE
She has been stable from a cardiac coronary artery disease standpoint.  She has not had any anginal episodes.  She asked me about her other blockage and I explained to her that she has a 50% blockage in a nondominant right coronary artery that should not give her any trouble in the future.  She has been strongly urged to lose weight.  She states that she is stress eater.  She will continue clopidogrel for about 2 more years

## 2022-05-02 NOTE — ASSESSMENT & PLAN NOTE
Blood pressure is well regulated on irbesartan carvedilol and spironolactone.  She will continue all 3 in the current dosage

## 2022-05-02 NOTE — PROGRESS NOTES
Office Visit    Chief Complaint  Coronary Artery Disease, Hypertension, and Hyperlipidemia    Subjective            Carley Deng presents to [facility] CARDIOLOGY  Carley is a 61 years old female with coronary disease, s/p myocardial infarction and stent PCI who is doing well.  She denies chest pain shortness of breath paroxysmal nocturnal dyspnea orthopnea palpitations dizziness or syncope.  She has lost a few pounds.      Past Medical History:   Diagnosis Date   • Abnormal ECG    • Anxiety    • Benign essential tremor    • CHF (congestive heart failure) (Formerly [facility])    • Coronary artery disease    • Depression    • Hyperlipemia    • Hypertension    • Limb swelling    • Lumbago    • Myocardial infarction (HCC)    • Seasonal allergies        Allergies   Allergen Reactions   • Eggs Or Egg-Derived Products Anaphylaxis   • Erythromycin Unknown - High Severity        Past Surgical History:   Procedure Laterality Date   • CARDIAC CATHETERIZATION Left 2021    Procedure: Left Heart Cath with coronary angiography and possible right heart cath. Possible PTCA/stent Insertion.;  Surgeon: Dc Mckeon MD;  Location: [facility] INVASIVE LOCATION;  Service: Cardiovascular;  Laterality: Left;   •  SECTION     • CHOLECYSTECTOMY     • COLONOSCOPY     • CORONARY STENT PLACEMENT     • ENDOSCOPY     • GALLBLADDER SURGERY     • HYSTERECTOMY     • HYSTERECTOMY          Social History     Tobacco Use   • Smoking status: Never Smoker   • Smokeless tobacco: Never Used   Vaping Use   • Vaping Use: Never used   Substance Use Topics   • Alcohol use: Never   • Drug use: Never       Family History   Problem Relation Age of Onset   • Coronary artery disease Mother    • Coronary artery disease Father         Prior to Admission medications    Medication Sig Start Date End Date Taking? Authorizing Provider   atorvastatin (LIPITOR) 80 MG tablet Take 1 tablet by mouth Every Night. 21  Yes Indra Viramontes  "MD Zully   clopidogrel (PLAVIX) 75 MG tablet Take 1 tablet by mouth Daily. 10/25/21  Yes Dc Mckeon MD   dicyclomine (BENTYL) 10 MG capsule 10 mg. 3/25/22  Yes ProviderJosh MD   irbesartan (AVAPRO) 300 MG tablet Take 300 mg by mouth Every Night.   Yes Emergency, Nurse Pieter, RN   lansoprazole (PREVACID) 30 MG capsule  8/11/21  Yes ProviderJosh MD   nitroglycerin (NITROSTAT) 0.4 MG SL tablet 1 under the tongue as needed for angina, may repeat q5mins for up three doses 8/26/21  Yes Dc Mckeon MD   simethicone (MYLICON,GAS-X) 125 MG capsule capsule 125 mg. 12/27/21  Yes Josh Sherwood MD   Sodium Sulfate-Mag Sulfate-KCl (Sutab) 2956-124-298 MG tablet Take 12 tablets by mouth Take As Directed. Take 12 tablets at 6 pm and 12 tablets 4 hours prior to procedure. 4/13/22  Yes Iman Roe APRN   spironolactone (ALDACTONE) 25 MG tablet Take 1 tablet by mouth Daily. 10/25/21  Yes Dc Mckeon MD   vitamin D (ERGOCALCIFEROL) 1.25 MG (06837 UT) capsule capsule Take 50,000 Units by mouth 1 (One) Time Per Week. 3/25/22  Yes ProviderJosh MD   carvedilol (COREG) 6.25 MG tablet Take 1 tablet by mouth 2 (Two) Times a Day With Meals for 30 days. 9/15/21 10/15/21  Dc Mckeon MD        Review of Systems   Constitutional: Positive for fatigue.   Respiratory: Negative for cough and shortness of breath.    Cardiovascular: Negative for chest pain, palpitations and leg swelling.   Neurological: Negative for dizziness.        Objective     /70   Pulse 65   Ht 165.1 cm (65\")   Wt 94.8 kg (209 lb)   BMI 34.78 kg/m²       Physical Exam  Constitutional:       General: She is awake.      Appearance: Normal appearance.   Neck:      Thyroid: No thyromegaly.      Vascular: No carotid bruit or JVD.   Cardiovascular:      Rate and Rhythm: Normal rate and regular rhythm.      Chest Wall: PMI is not displaced.      Pulses: Normal pulses.      Heart sounds: Normal heart sounds, S1 normal " and S2 normal. No murmur heard.    No friction rub. No gallop. No S3 or S4 sounds.   Pulmonary:      Effort: Pulmonary effort is normal.      Breath sounds: Normal breath sounds and air entry. No wheezing, rhonchi or rales.   Abdominal:      General: Bowel sounds are normal.      Palpations: Abdomen is soft. There is no mass.      Tenderness: There is no abdominal tenderness.   Musculoskeletal:      Cervical back: Neck supple.      Right lower leg: No edema.      Left lower leg: No edema.   Neurological:      Mental Status: She is alert and oriented to person, place, and time.   Psychiatric:         Mood and Affect: Mood normal.         Behavior: Behavior is cooperative.           Result Review :                      Lab Results   Component Value Date    PROBNP 90.0 01/05/2022    PROBNP 148.1 10/22/2021    PROBNP 836.3 09/08/2021     CMP    CMP 11/19/21 1/5/22 4/29/22   Glucose 140 (A) 118 (A) 117 (A)   BUN 13 13 15   Creatinine 0.97 0.96 1.11 (A)   eGFR Non African Am 58 (A) 59 (A)    Sodium 140 146 (A) 141   Potassium 4.0 4.3 4.2   Chloride 106 111 (A) 105   Calcium 9.5 9.1 9.6   Albumin  4.60 3.90   Total Bilirubin  0.4 0.5   Alkaline Phosphatase  119 (A) 128 (A)   AST (SGOT)  17 17   ALT (SGPT)  21 22   (A) Abnormal value            CBC w/diff    CBC w/Diff 8/12/21 8/13/21 9/8/21   WBC 8.06 10.04 11.18 (A)   RBC 4.60 4.29 4.59   Hemoglobin 14.3 13.2 14.3   Hematocrit 41.2 39.7 41.9   MCV 89.6 92.5 91.3   MCH 31.1 30.8 31.2   MCHC 34.7 33.2 34.1   RDW 13.0 13.3 13.1   Platelets 238 217 282   Neutrophil Rel % 79.7 (A) 85.6 (A)    Immature Granulocyte Rel % 0.2 0.4    Lymphocyte Rel % 11.9 (A) 6.9 (A)    Monocyte Rel % 6.7 6.0    Eosinophil Rel % 1.1 0.7    Basophil Rel % 0.4 0.4    (A) Abnormal value             Lipid Panel    Lipid Panel 9/8/21 1/5/22 4/29/22   Total Cholesterol 146 141 151   Triglycerides 74 118 111   HDL Cholesterol 69 (A) 57 51   VLDL Cholesterol 14 21 20   LDL Cholesterol  63 63 80   LDL/HDL  Ratio 0.90 1.06 1.53   (A) Abnormal value             Lab Results   Component Value Date    TSH 1.960 08/12/2021      No results found for: FREET4   No results found for: DDIMERQUANT  Magnesium   Date Value Ref Range Status   01/05/2022 2.1 1.6 - 2.4 mg/dL Final      No results found for: DIGOXIN   A1C Last 3 Results    HGBA1C Last 3 Results 8/12/21   Hemoglobin A1C 6.07 (A)   (A) Abnormal value                      Assessment and Plan        Diagnoses and all orders for this visit:    1. Coronary artery disease involving native coronary artery of native heart without angina pectoris (Primary)  Assessment & Plan:  She has been stable from a cardiac coronary artery disease standpoint.  She has not had any anginal episodes.  She asked me about her other blockage and I explained to her that she has a 50% blockage in a nondominant right coronary artery that should not give her any trouble in the future.  She has been strongly urged to lose weight.  She states that she is stress eater.  She will continue clopidogrel for about 2 more years    Orders:  -     carvedilol (COREG) 6.25 MG tablet; Take 1 tablet by mouth 2 (Two) Times a Day With Meals for 30 days.  Dispense: 180 tablet; Refill: 3  -     irbesartan (AVAPRO) 300 MG tablet; Take 1 tablet by mouth Every Night.  Dispense: 90 tablet; Refill: 3  -     Lipid Panel; Future  -     Comprehensive Metabolic Panel; Future  -     Magnesium; Future  -     Lipid Panel; Future  -     Comprehensive Metabolic Panel; Future  -     Magnesium; Future    2. Essential hypertension  Assessment & Plan:  Blood pressure is well regulated on irbesartan carvedilol and spironolactone.  She will continue all 3 in the current dosage    Orders:  -     carvedilol (COREG) 6.25 MG tablet; Take 1 tablet by mouth 2 (Two) Times a Day With Meals for 30 days.  Dispense: 180 tablet; Refill: 3  -     irbesartan (AVAPRO) 300 MG tablet; Take 1 tablet by mouth Every Night.  Dispense: 90 tablet; Refill: 3  -      Lipid Panel; Future  -     Comprehensive Metabolic Panel; Future  -     Magnesium; Future  -     Lipid Panel; Future  -     Comprehensive Metabolic Panel; Future  -     Magnesium; Future    3. Hyperlipidemia LDL goal <70  Assessment & Plan:  Her lipids are not at goal.  Her LDL has gone despite 80 mg of Lipitor.  I have suggested to her that she should try Crestor 40 mg and if that does not regulate her LDL then we will add Zetia or switch her to a PCSK9 inhibitor.  She is agreeable.    Orders:  -     Lipid Panel; Future  -     Comprehensive Metabolic Panel; Future  -     Magnesium; Future  -     Lipid Panel; Future  -     Comprehensive Metabolic Panel; Future  -     Magnesium; Future    Other orders  -     rosuvastatin (CRESTOR) 40 MG tablet; Take 1 tablet by mouth Daily.  Dispense: 90 tablet; Refill: 3          Follow Up     Return in about 7 months (around 12/2/2022) for VALAYAM.    Patient was given instructions and counseling regarding her condition or for health maintenance advice. Please see specific information pulled into the AVS if appropriate.     Dc Mckeon MD  05/02/22 11:56 EDT

## 2022-05-06 ENCOUNTER — HOSPITAL ENCOUNTER (OUTPATIENT)
Dept: MAMMOGRAPHY | Facility: HOSPITAL | Age: 62
Discharge: HOME | End: 2022-05-06
Admitting: INTERNAL MEDICINE

## 2022-05-06 DIAGNOSIS — Z12.31 VISIT FOR SCREENING MAMMOGRAM: ICD-10-CM

## 2022-05-06 PROCEDURE — 77067 SCR MAMMO BI INCL CAD: CPT

## 2022-05-06 PROCEDURE — 77063 BREAST TOMOSYNTHESIS BI: CPT

## 2022-06-15 PROCEDURE — U0004 COV-19 TEST NON-CDC HGH THRU: HCPCS | Performed by: NURSE PRACTITIONER

## 2022-06-30 ENCOUNTER — TELEPHONE (OUTPATIENT)
Dept: CARDIOLOGY | Facility: CLINIC | Age: 62
End: 2022-06-30

## 2022-07-07 ENCOUNTER — TELEPHONE (OUTPATIENT)
Dept: CARDIOLOGY | Facility: CLINIC | Age: 62
End: 2022-07-07

## 2022-07-07 NOTE — TELEPHONE ENCOUNTER
Received Vm from patient    NOEMY patient. Patient with questions about labs ordered. All questions answered

## 2022-07-21 ENCOUNTER — LAB (OUTPATIENT)
Dept: LAB | Facility: HOSPITAL | Age: 62
End: 2022-07-21

## 2022-07-21 DIAGNOSIS — E78.5 HYPERLIPIDEMIA LDL GOAL <70: ICD-10-CM

## 2022-07-21 DIAGNOSIS — I25.10 CORONARY ARTERY DISEASE INVOLVING NATIVE CORONARY ARTERY OF NATIVE HEART WITHOUT ANGINA PECTORIS: ICD-10-CM

## 2022-07-21 DIAGNOSIS — I50.21 ACUTE SYSTOLIC CONGESTIVE HEART FAILURE: ICD-10-CM

## 2022-07-21 DIAGNOSIS — I10 ESSENTIAL HYPERTENSION: ICD-10-CM

## 2022-07-21 LAB
ALBUMIN SERPL-MCNC: 4.1 G/DL (ref 3.5–5.2)
ALBUMIN/GLOB SERPL: 1.3 G/DL
ALP SERPL-CCNC: 106 U/L (ref 39–117)
ALT SERPL W P-5'-P-CCNC: 12 U/L (ref 1–33)
ANION GAP SERPL CALCULATED.3IONS-SCNC: 11.2 MMOL/L (ref 5–15)
AST SERPL-CCNC: 14 U/L (ref 1–32)
BASOPHILS # BLD AUTO: 0.04 10*3/MM3 (ref 0–0.2)
BASOPHILS NFR BLD AUTO: 0.6 % (ref 0–1.5)
BILIRUB SERPL-MCNC: 0.4 MG/DL (ref 0–1.2)
BUN SERPL-MCNC: 16 MG/DL (ref 8–23)
BUN/CREAT SERPL: 14.8 (ref 7–25)
CALCIUM SPEC-SCNC: 9.1 MG/DL (ref 8.6–10.5)
CHLORIDE SERPL-SCNC: 103 MMOL/L (ref 98–107)
CHOLEST SERPL-MCNC: 155 MG/DL (ref 0–200)
CO2 SERPL-SCNC: 23.8 MMOL/L (ref 22–29)
CREAT SERPL-MCNC: 1.08 MG/DL (ref 0.57–1)
DEPRECATED RDW RBC AUTO: 41.6 FL (ref 37–54)
EGFRCR SERPLBLD CKD-EPI 2021: 57.8 ML/MIN/1.73
EOSINOPHIL # BLD AUTO: 0.2 10*3/MM3 (ref 0–0.4)
EOSINOPHIL NFR BLD AUTO: 2.9 % (ref 0.3–6.2)
ERYTHROCYTE [DISTWIDTH] IN BLOOD BY AUTOMATED COUNT: 12.5 % (ref 12.3–15.4)
GLOBULIN UR ELPH-MCNC: 3.1 GM/DL
GLUCOSE SERPL-MCNC: 115 MG/DL (ref 65–99)
HCT VFR BLD AUTO: 40.6 % (ref 34–46.6)
HDLC SERPL-MCNC: 64 MG/DL (ref 40–60)
HGB BLD-MCNC: 13.9 G/DL (ref 12–15.9)
IMM GRANULOCYTES # BLD AUTO: 0.02 10*3/MM3 (ref 0–0.05)
IMM GRANULOCYTES NFR BLD AUTO: 0.3 % (ref 0–0.5)
LDLC SERPL CALC-MCNC: 68 MG/DL (ref 0–100)
LDLC/HDLC SERPL: 1.01 {RATIO}
LYMPHOCYTES # BLD AUTO: 1.14 10*3/MM3 (ref 0.7–3.1)
LYMPHOCYTES NFR BLD AUTO: 16.7 % (ref 19.6–45.3)
MAGNESIUM SERPL-MCNC: 2.1 MG/DL (ref 1.6–2.4)
MCH RBC QN AUTO: 31.3 PG (ref 26.6–33)
MCHC RBC AUTO-ENTMCNC: 34.2 G/DL (ref 31.5–35.7)
MCV RBC AUTO: 91.4 FL (ref 79–97)
MONOCYTES # BLD AUTO: 0.53 10*3/MM3 (ref 0.1–0.9)
MONOCYTES NFR BLD AUTO: 7.8 % (ref 5–12)
NEUTROPHILS NFR BLD AUTO: 4.89 10*3/MM3 (ref 1.7–7)
NEUTROPHILS NFR BLD AUTO: 71.7 % (ref 42.7–76)
NRBC BLD AUTO-RTO: 0 /100 WBC (ref 0–0.2)
PLATELET # BLD AUTO: 222 10*3/MM3 (ref 140–450)
PMV BLD AUTO: 9.4 FL (ref 6–12)
POTASSIUM SERPL-SCNC: 4.2 MMOL/L (ref 3.5–5.2)
PROT SERPL-MCNC: 7.2 G/DL (ref 6–8.5)
RBC # BLD AUTO: 4.44 10*6/MM3 (ref 3.77–5.28)
SODIUM SERPL-SCNC: 138 MMOL/L (ref 136–145)
TRIGL SERPL-MCNC: 133 MG/DL (ref 0–150)
VLDLC SERPL-MCNC: 23 MG/DL (ref 5–40)
WBC NRBC COR # BLD: 6.82 10*3/MM3 (ref 3.4–10.8)

## 2022-07-21 PROCEDURE — 85025 COMPLETE CBC W/AUTO DIFF WBC: CPT

## 2022-07-21 PROCEDURE — 36415 COLL VENOUS BLD VENIPUNCTURE: CPT

## 2022-07-21 PROCEDURE — 80053 COMPREHEN METABOLIC PANEL: CPT

## 2022-07-21 PROCEDURE — 80061 LIPID PANEL: CPT

## 2022-07-21 PROCEDURE — 83735 ASSAY OF MAGNESIUM: CPT

## 2022-08-01 ENCOUNTER — TELEPHONE (OUTPATIENT)
Dept: GASTROENTEROLOGY | Facility: CLINIC | Age: 62
End: 2022-08-01

## 2022-08-01 NOTE — TELEPHONE ENCOUNTER
Patient aware to hold plavix 5 days prior to procedure. Instructions for colon prep sent via ABB.

## 2022-08-03 ENCOUNTER — TELEPHONE (OUTPATIENT)
Dept: GASTROENTEROLOGY | Facility: CLINIC | Age: 62
End: 2022-08-03

## 2022-08-12 RX ORDER — SOD SULF/POT CHLORIDE/MAG SULF 1.479 G
12 TABLET ORAL TAKE AS DIRECTED
Qty: 24 TABLET | Refills: 0 | Status: ON HOLD | OUTPATIENT
Start: 2022-08-12 | End: 2022-08-17

## 2022-08-15 ENCOUNTER — TELEPHONE (OUTPATIENT)
Dept: GASTROENTEROLOGY | Facility: CLINIC | Age: 62
End: 2022-08-15

## 2022-08-15 NOTE — TELEPHONE ENCOUNTER
Spoke with patient in regards to PA  For Sutab, Advised patient she can  sample from office due to procedure being 8/17/2022.

## 2022-08-16 RX ORDER — BUPROPION HYDROCHLORIDE 300 MG/1
300 TABLET ORAL DAILY
COMMUNITY

## 2022-08-17 ENCOUNTER — HOSPITAL ENCOUNTER (OUTPATIENT)
Facility: HOSPITAL | Age: 62
Setting detail: HOSPITAL OUTPATIENT SURGERY
Discharge: HOME | End: 2022-08-17
Attending: INTERNAL MEDICINE | Admitting: INTERNAL MEDICINE

## 2022-08-17 ENCOUNTER — ANESTHESIA (OUTPATIENT)
Dept: GASTROENTEROLOGY | Facility: HOSPITAL | Age: 62
End: 2022-08-17

## 2022-08-17 ENCOUNTER — ANESTHESIA EVENT (OUTPATIENT)
Dept: GASTROENTEROLOGY | Facility: HOSPITAL | Age: 62
End: 2022-08-17

## 2022-08-17 VITALS
OXYGEN SATURATION: 97 % | TEMPERATURE: 97.9 F | WEIGHT: 202.6 LBS | BODY MASS INDEX: 33.76 KG/M2 | HEIGHT: 65 IN | SYSTOLIC BLOOD PRESSURE: 100 MMHG | HEART RATE: 79 BPM | DIASTOLIC BLOOD PRESSURE: 67 MMHG | RESPIRATION RATE: 17 BRPM

## 2022-08-17 DIAGNOSIS — K58.0 IRRITABLE BOWEL SYNDROME WITH DIARRHEA: ICD-10-CM

## 2022-08-17 DIAGNOSIS — R19.7 DIARRHEA, UNSPECIFIED TYPE: ICD-10-CM

## 2022-08-17 DIAGNOSIS — R10.13 EPIGASTRIC PAIN: ICD-10-CM

## 2022-08-17 DIAGNOSIS — R14.0 ABDOMINAL BLOATING: ICD-10-CM

## 2022-08-17 PROCEDURE — 43239 EGD BIOPSY SINGLE/MULTIPLE: CPT | Performed by: INTERNAL MEDICINE

## 2022-08-17 PROCEDURE — 88305 TISSUE EXAM BY PATHOLOGIST: CPT | Performed by: INTERNAL MEDICINE

## 2022-08-17 PROCEDURE — 25010000002 PROPOFOL 10 MG/ML EMULSION: Performed by: NURSE ANESTHETIST, CERTIFIED REGISTERED

## 2022-08-17 PROCEDURE — 45380 COLONOSCOPY AND BIOPSY: CPT | Performed by: INTERNAL MEDICINE

## 2022-08-17 RX ORDER — LIDOCAINE HYDROCHLORIDE 20 MG/ML
INJECTION, SOLUTION EPIDURAL; INFILTRATION; INTRACAUDAL; PERINEURAL AS NEEDED
Status: DISCONTINUED | OUTPATIENT
Start: 2022-08-17 | End: 2022-08-17 | Stop reason: SURG

## 2022-08-17 RX ORDER — SODIUM CHLORIDE, SODIUM LACTATE, POTASSIUM CHLORIDE, CALCIUM CHLORIDE 600; 310; 30; 20 MG/100ML; MG/100ML; MG/100ML; MG/100ML
30 INJECTION, SOLUTION INTRAVENOUS CONTINUOUS
Status: DISCONTINUED | OUTPATIENT
Start: 2022-08-17 | End: 2022-08-17 | Stop reason: HOSPADM

## 2022-08-17 RX ORDER — PROPOFOL 10 MG/ML
VIAL (ML) INTRAVENOUS AS NEEDED
Status: DISCONTINUED | OUTPATIENT
Start: 2022-08-17 | End: 2022-08-17 | Stop reason: SURG

## 2022-08-17 RX ADMIN — PROPOFOL 50 MG: 10 INJECTION, EMULSION INTRAVENOUS at 12:23

## 2022-08-17 RX ADMIN — SODIUM CHLORIDE, POTASSIUM CHLORIDE, SODIUM LACTATE AND CALCIUM CHLORIDE 30 ML/HR: 600; 310; 30; 20 INJECTION, SOLUTION INTRAVENOUS at 12:11

## 2022-08-17 RX ADMIN — PROPOFOL 200 MCG/KG/MIN: 10 INJECTION, EMULSION INTRAVENOUS at 12:23

## 2022-08-17 RX ADMIN — LIDOCAINE HYDROCHLORIDE 100 MG: 20 INJECTION, SOLUTION EPIDURAL; INFILTRATION; INTRACAUDAL; PERINEURAL at 12:23

## 2022-08-17 NOTE — ANESTHESIA PREPROCEDURE EVALUATION
Anesthesia Evaluation     Patient summary reviewed and Nursing notes reviewed   no history of anesthetic complications:  NPO Solid Status: > 8 hours  NPO Liquid Status: > 2 hours           Airway   Mallampati: III  TM distance: >3 FB  Neck ROM: full  Possible difficult intubation and Small opening  Dental      Pulmonary - negative pulmonary ROS and normal exam    breath sounds clear to auscultation  Cardiovascular - normal exam  Exercise tolerance: good (4-7 METS)    ECG reviewed  Patient on routine beta blocker and Beta blocker given within 24 hours of surgery  Rhythm: regular  Rate: normal    (+) hypertension, past MI , CAD, CHF , hyperlipidemia,       Neuro/Psych  (+) psychiatric history,    GI/Hepatic/Renal/Endo - negative ROS     Musculoskeletal (-) negative ROS    Abdominal    Substance History - negative use     OB/GYN negative ob/gyn ROS         Other - negative ROS            ekg nsr    Interpretation Summary    · The patient reported shortness of breath during the stress test.  · Limited exercise capacity. No electrocardiographic evidence of exercise-induced ischemia    Pt with heart cath and pci in 08/21             Anesthesia Plan    ASA 3     general     (Total IV Anesthesia    Patient understands anesthesia not responsible for dental damage.    Last plavix dose friday  )  intravenous induction     Anesthetic plan, risks, benefits, and alternatives have been provided, discussed and informed consent has been obtained with: patient.    Plan discussed with CRNA.        CODE STATUS:

## 2022-08-17 NOTE — ANESTHESIA POSTPROCEDURE EVALUATION
Patient: Carley Deng    Procedure Summary     Date: 08/17/22 Room / Location: [facility] ENDOSCOPY 1 / [facility] ENDOSCOPY    Anesthesia Start: 1221 Anesthesia Stop: 1306    Procedures:       ESOPHAGOGASTRODUODENOSCOPY WITH BIOPSIES (N/A )      COLONOSCOPY WITH BIOPSIES (N/A ) Diagnosis:       Diarrhea, unspecified type      Irritable bowel syndrome with diarrhea      Abdominal bloating      Epigastric pain      (Diarrhea, unspecified type [R19.7])      (Irritable bowel syndrome with diarrhea [K58.0])      (Abdominal bloating [R14.0])      (Epigastric pain [R10.13])    Surgeons: Willow Paniagua MD Provider: Humza Mix MD    Anesthesia Type: general ASA Status: 3          Anesthesia Type: general    Vitals  Vitals Value Taken Time   BP 96/58 08/17/22 1305   Temp     Pulse 73 08/17/22 1307   Resp     SpO2 97 % 08/17/22 1307   Vitals shown include unvalidated device data.        Post Anesthesia Care and Evaluation    Patient location during evaluation: bedside  Patient participation: complete - patient participated  Level of consciousness: awake  Pain score: 0  Pain management: adequate    Airway patency: patent  Anesthetic complications: No anesthetic complications  PONV Status: none  Cardiovascular status: acceptable and stable  Respiratory status: acceptable and room air  Hydration status: acceptable    Comments: An Anesthesiologist personally participated in the most demanding procedures (including induction and emergence if applicable) in the anesthesia plan, monitored the course of anesthesia administration at frequent intervals and remained physically present and available for immediate diagnosis and treatment of emergencies.

## 2022-08-17 NOTE — H&P
Pre Procedure History & Physical    Chief Complaint:   Epigastric pain, bloating, diarrhea    Subjective     HPI:   63 yo F here for eval of epigastric pain, bloating, diarrhea.    Past Medical History:   Past Medical History:   Diagnosis Date   • Abnormal ECG    • Anxiety    • Benign essential tremor    • CHF (congestive heart failure) (HCC)    • Coronary artery disease    • Depression    • Hyperlipemia    • Hypertension    • Limb swelling    • Lumbago    • Myocardial infarction (HCC)     2021   • Seasonal allergies        Past Surgical History:  Past Surgical History:   Procedure Laterality Date   • CARDIAC CATHETERIZATION Left 2021    Procedure: Left Heart Cath with coronary angiography and possible right heart cath. Possible PTCA/stent Insertion.;  Surgeon: Dc Mckeon MD;  Location: [facility] CATH INVASIVE LOCATION;  Service: Cardiovascular;  Laterality: Left;   •  SECTION     • CHOLECYSTECTOMY     • COLONOSCOPY     • CORONARY STENT PLACEMENT     • ENDOSCOPY     • HYSTERECTOMY         Family History:  Family History   Problem Relation Age of Onset   • Coronary artery disease Mother    • Coronary artery disease Father    • Malig Hyperthermia Neg Hx        Social History:   reports that she has never smoked. She has never used smokeless tobacco. She reports that she does not drink alcohol and does not use drugs.    Medications:   Medications Prior to Admission   Medication Sig Dispense Refill Last Dose   • buPROPion XL (WELLBUTRIN XL) 300 MG 24 hr tablet Take 300 mg by mouth Daily.   2022 at Unknown time   • carvedilol (COREG) 6.25 MG tablet Take 1 tablet by mouth 2 (Two) Times a Day With Meals for 30 days. 180 tablet 3 2022 at 0815   • dicyclomine (BENTYL) 10 MG capsule Take 10 mg by mouth 4 (Four) Times a Day As Needed.   Past Month at Unknown time   • irbesartan (AVAPRO) 300 MG tablet Take 1 tablet by mouth Every Night. (Patient taking differently: Take 300 mg by mouth  "Daily.) 90 tablet 3 8/16/2022 at Unknown time   • lansoprazole (PREVACID) 30 MG capsule Take 30 mg by mouth Daily.   8/16/2022 at Unknown time   • rosuvastatin (CRESTOR) 40 MG tablet Take 1 tablet by mouth Daily. 90 tablet 3 8/16/2022 at Unknown time   • simethicone (MYLICON,GAS-X) 125 MG capsule capsule 125 mg.   8/16/2022 at Unknown time   • spironolactone (ALDACTONE) 25 MG tablet Take 1 tablet by mouth Daily. 90 tablet 3 8/16/2022 at Unknown time   • vitamin D (ERGOCALCIFEROL) 1.25 MG (90174 UT) capsule capsule Take 50,000 Units by mouth 1 (One) Time Per Week.   Past Week at Unknown time   • clopidogrel (PLAVIX) 75 MG tablet Take 1 tablet by mouth Daily. 90 tablet 3 8/12/2022   • nitroglycerin (NITROSTAT) 0.4 MG SL tablet 1 under the tongue as needed for angina, may repeat q5mins for up three doses 100 tablet 11 More than a month at Unknown time       Allergies:  Eggs or egg-derived products and Erythromycin    ROS:    Pertinent items are noted in HPI     Objective     Blood pressure 135/83, pulse 82, temperature 98.4 °F (36.9 °C), temperature source Temporal, resp. rate 16, height 165.5 cm (65.16\"), weight 91.9 kg (202 lb 9.6 oz), SpO2 95 %, not currently breastfeeding.    Physical Exam   Constitutional: Pt is oriented to person, place, and time and well-developed, well-nourished, and in no distress.   Mouth/Throat: Oropharynx is clear and moist.   Neck: Normal range of motion.   Cardiovascular: Normal rate, regular rhythm and normal heart sounds.    Pulmonary/Chest: Effort normal and breath sounds normal.   Abdominal: Soft. Nontender  Skin: Skin is warm and dry.   Psychiatric: Mood, memory, affect and judgment normal.     Assessment & Plan     Diagnosis:  Epigastric pain, bloating, diarrhea    Anticipated Surgical Procedure:  EGD/colonoscopy    The risks, benefits, and alternatives of this procedure have been discussed with the patient or the responsible party- the patient understands and agrees to " proceed.

## 2022-08-19 LAB
CYTO UR: NORMAL
LAB AP CASE REPORT: NORMAL
LAB AP CLINICAL INFORMATION: NORMAL
PATH REPORT.FINAL DX SPEC: NORMAL
PATH REPORT.GROSS SPEC: NORMAL

## 2022-08-22 ENCOUNTER — TELEPHONE (OUTPATIENT)
Dept: GASTROENTEROLOGY | Facility: CLINIC | Age: 62
End: 2022-08-22

## 2022-08-22 NOTE — TELEPHONE ENCOUNTER
Patient notified of results. Patient states that she is having pain and doesn't believe her current ppi is working.     Letter mailed to pt and pcp. 10 year recall placed. Care gap updated.

## 2022-08-23 ENCOUNTER — TELEPHONE (OUTPATIENT)
Dept: GASTROENTEROLOGY | Facility: CLINIC | Age: 62
End: 2022-08-23

## 2022-08-23 RX ORDER — PANTOPRAZOLE SODIUM 40 MG/1
40 TABLET, DELAYED RELEASE ORAL DAILY
Qty: 90 TABLET | Refills: 1 | Status: SHIPPED | OUTPATIENT
Start: 2022-08-23 | End: 2022-11-21 | Stop reason: SDUPTHER

## 2022-08-23 NOTE — TELEPHONE ENCOUNTER
Okay lets discontinue the Prevacid and I will send in Protonix 40 mg for patient to take once daily, before breakfast.

## 2022-10-03 RX ORDER — CLOPIDOGREL BISULFATE 75 MG/1
TABLET ORAL
Qty: 90 TABLET | Refills: 0 | Status: SHIPPED | OUTPATIENT
Start: 2022-10-03 | End: 2022-12-28

## 2022-11-09 ENCOUNTER — TELEPHONE (OUTPATIENT)
Dept: GASTROENTEROLOGY | Facility: CLINIC | Age: 62
End: 2022-11-09

## 2022-11-09 NOTE — TELEPHONE ENCOUNTER
Called patient and left a message attempting to offer the patient an earlier appointment with Iman Roe as she will no longer be seeing patients on original appointment date.  Left a message for a call back.

## 2022-11-09 NOTE — TELEPHONE ENCOUNTER
Pt called and left a voicemail returning my call. I called and spoke with patient regarding needing to reschedule their follow up appointment with Johnny Roe as she will not be in office on original appointment date. Patient verbalized understanding and was moved to 11/21 at 3 pm

## 2022-11-21 ENCOUNTER — OFFICE VISIT (OUTPATIENT)
Dept: GASTROENTEROLOGY | Facility: CLINIC | Age: 62
End: 2022-11-21

## 2022-11-21 VITALS
BODY MASS INDEX: 34.52 KG/M2 | WEIGHT: 207.2 LBS | HEIGHT: 65 IN | SYSTOLIC BLOOD PRESSURE: 140 MMHG | DIASTOLIC BLOOD PRESSURE: 81 MMHG | HEART RATE: 88 BPM

## 2022-11-21 DIAGNOSIS — K57.90 DIVERTICULOSIS: ICD-10-CM

## 2022-11-21 DIAGNOSIS — K22.70 BARRETT'S ESOPHAGUS WITHOUT DYSPLASIA: ICD-10-CM

## 2022-11-21 DIAGNOSIS — K29.70 GASTRITIS WITHOUT BLEEDING, UNSPECIFIED CHRONICITY, UNSPECIFIED GASTRITIS TYPE: Primary | ICD-10-CM

## 2022-11-21 DIAGNOSIS — K58.2 IRRITABLE BOWEL SYNDROME WITH BOTH CONSTIPATION AND DIARRHEA: ICD-10-CM

## 2022-11-21 DIAGNOSIS — K64.9 HEMORRHOIDS, UNSPECIFIED HEMORRHOID TYPE: ICD-10-CM

## 2022-11-21 PROCEDURE — 99214 OFFICE O/P EST MOD 30 MIN: CPT | Performed by: NURSE PRACTITIONER

## 2022-11-21 RX ORDER — L.ACIDOPH/B.ANIMALIS/B.LONGUM 15B CELL
1 CAPSULE ORAL DAILY
Qty: 30 CAPSULE | Refills: 5 | Status: SHIPPED | OUTPATIENT
Start: 2022-11-21 | End: 2022-12-21

## 2022-11-21 RX ORDER — PANTOPRAZOLE SODIUM 40 MG/1
40 TABLET, DELAYED RELEASE ORAL DAILY
Qty: 90 TABLET | Refills: 1 | Status: SHIPPED | OUTPATIENT
Start: 2022-11-21

## 2022-11-21 RX ORDER — PSYLLIUM HUSK 0.4 G
0.52 CAPSULE ORAL DAILY
Qty: 30 CAPSULE | Refills: 5 | Status: SHIPPED | OUTPATIENT
Start: 2022-11-21 | End: 2023-11-21

## 2022-11-21 NOTE — PROGRESS NOTES
"  Chief Complaint  Follow-up (EGD and Colonoscopy, alternating bowel habits)    History of Present Illness  Carley Deng is a 62 y.o. who presents to [facility] GASTROENTEROLOGY for follow up of Follow-up (EGD and Colonoscopy, alternating bowel habits).    Ms. Deng reports that she is having constipation followed by diarrhea from time to time. In a weeks average she normally does have a BM at least once daily. Stool is hard and \"piecy after that clears out\". Admits that she has not been drinking the best. Denies any hematochezia or melena.     Has noticed some post nasal drip w/ burning sensation but that has resolved (last about 1-2 weeks). Continues to take Protonix 40mg in the AM. A lot of gas and bloating but it has decreased since starting PPI. Denies any nausea, vomiting, or dyspahgia. Appetite has decreased in the last week but weight stable. Reports she is under a significant amount of stress    Labs Result Review Imaging    Past Medical History:   Diagnosis Date   • Abnormal ECG    • Anxiety    • Guzmán esophagus    • Benign essential tremor    • CHF (congestive heart failure) (HCC)    • Cholelithiasis     Had Gallbladder Removed   • Coronary artery disease    • Depression    • Diverticulitis of colon ?    diverticuLOSIS   • GERD (gastroesophageal reflux disease)    • Hyperlipemia    • Hypertension    • Irritable bowel syndrome    • Lactose intolerance    • Limb swelling    • Lumbago    • Myocardial infarction (HCC)     2021   • Seasonal allergies        Past Surgical History:   Procedure Laterality Date   • ABDOMINAL SURGERY  ?!    Gallbladder   • CARDIAC CATHETERIZATION Left 2021    Procedure: Left Heart Cath with coronary angiography and possible right heart cath. Possible PTCA/stent Insertion.;  Surgeon: Dc Mckeon MD;  Location: [facility] INVASIVE LOCATION;  Service: Cardiovascular;  Laterality: Left;   •  SECTION     • " CHOLECYSTECTOMY     • COLONOSCOPY  2000   • COLONOSCOPY N/A 08/17/2022    Procedure: COLONOSCOPY WITH BIOPSIES;  Surgeon: Willow Paniagua MD;  Location: [facility] ENDOSCOPY;  Service: Gastroenterology;  Laterality: N/A;  HEMORRHOIDS  DIVERTICULOSIS   • CORONARY STENT PLACEMENT     • ENDOSCOPY  2010   • ENDOSCOPY N/A 08/17/2022    Procedure: ESOPHAGOGASTRODUODENOSCOPY WITH BIOPSIES;  Surgeon: Willow Paniagua MD;  Location: [facility] ENDOSCOPY;  Service: Gastroenterology;  Laterality: N/A;  GASTRITIS   • HYSTERECTOMY  1990   • TUBAL ABDOMINAL LIGATION  1984   • UPPER GASTROINTESTINAL ENDOSCOPY  2022       Current Outpatient Medications on File Prior to Visit   Medication Sig Dispense Refill   • buPROPion XL (WELLBUTRIN XL) 300 MG 24 hr tablet Take 300 mg by mouth Daily.     • clopidogrel (PLAVIX) 75 MG tablet TAKE ONE TABLET BY MOUTH DAILY 90 tablet 0   • dicyclomine (BENTYL) 10 MG capsule Take 10 mg by mouth 4 (Four) Times a Day As Needed.     • irbesartan (AVAPRO) 300 MG tablet Take 1 tablet by mouth Every Night. (Patient taking differently: Take 300 mg by mouth Daily.) 90 tablet 3   • nitroglycerin (NITROSTAT) 0.4 MG SL tablet 1 under the tongue as needed for angina, may repeat q5mins for up three doses 100 tablet 11   • rosuvastatin (CRESTOR) 40 MG tablet Take 1 tablet by mouth Daily. 90 tablet 3   • simethicone (MYLICON,GAS-X) 125 MG capsule capsule 125 mg.     • spironolactone (ALDACTONE) 25 MG tablet Take 1 tablet by mouth Daily. 90 tablet 3   • vitamin D (ERGOCALCIFEROL) 1.25 MG (64482 UT) capsule capsule Take 50,000 Units by mouth 1 (One) Time Per Week.     • [DISCONTINUED] pantoprazole (PROTONIX) 40 MG EC tablet Take 1 tablet by mouth Daily. 90 tablet 1   • carvedilol (COREG) 6.25 MG tablet Take 1 tablet by mouth 2 (Two) Times a Day With Meals for 30 days. 180 tablet 3     No current facility-administered medications on file prior to visit.       Social History     Social History Narrative   • Not  "on file         Objective     Review of Systems   Gastrointestinal: Positive for constipation and GERD.        Vital Signs:   /81 (BP Location: Left arm, Patient Position: Sitting, Cuff Size: Large Adult)   Pulse 88   Ht 165.1 cm (65\")   Wt 94 kg (207 lb 3.2 oz)   BMI 34.48 kg/m²       Physical Exam  Constitutional:       General: She is not in acute distress.     Appearance: Normal appearance. She is well-developed. She is obese.   HENT:      Head: Normocephalic and atraumatic.   Eyes:      Conjunctiva/sclera: Conjunctivae normal.      Pupils: Pupils are equal, round, and reactive to light.      Visual Fields: Right eye visual fields normal and left eye visual fields normal.   Cardiovascular:      Rate and Rhythm: Normal rate and regular rhythm.      Heart sounds: Normal heart sounds.   Pulmonary:      Effort: Pulmonary effort is normal. No retractions.      Breath sounds: Normal breath sounds and air entry.   Abdominal:      General: Bowel sounds are normal. There is no distension.      Palpations: Abdomen is soft.      Tenderness: There is no abdominal tenderness.      Comments: No appreciable hepatosplenomegaly or ascites   Musculoskeletal:         General: Normal range of motion.      Cervical back: Normal range of motion and neck supple.   Skin:     General: Skin is warm and dry.   Neurological:      Mental Status: She is alert and oriented to person, place, and time.   Psychiatric:         Mood and Affect: Mood and affect normal.         Behavior: Behavior normal.         Result Review :   The following data was reviewed by: SRAVANTHI Urbina on 11/21/2022:  CBC w/diff    CBC w/Diff 7/21/22   WBC 6.82   RBC 4.44   Hemoglobin 13.9   Hematocrit 40.6   MCV 91.4   MCH 31.3   MCHC 34.2   RDW 12.5   Platelets 222   Neutrophil Rel % 71.7   Immature Granulocyte Rel % 0.3   Lymphocyte Rel % 16.7 (A)   Monocyte Rel % 7.8   Eosinophil Rel % 2.9   Basophil Rel % 0.6   (A) Abnormal value            CMP  "   CMP 1/5/22 4/29/22 7/21/22   Glucose 118 (A) 117 (A) 115 (A)   BUN 13 15 16   Creatinine 0.96 1.11 (A) 1.08 (A)   eGFR Non African Am 59 (A)     Sodium 146 (A) 141 138   Potassium 4.3 4.2 4.2   Chloride 111 (A) 105 103   Calcium 9.1 9.6 9.1   Albumin 4.60 3.90 4.10   Total Bilirubin 0.4 0.5 0.4   Alkaline Phosphatase 119 (A) 128 (A) 106   AST (SGOT) 17 17 14   ALT (SGPT) 21 22 12   (A) Abnormal value            Protime   Date Value Ref Range Status   08/12/2021 10.6 9.4 - 12.0 Seconds Final     INR   Date Value Ref Range Status   08/12/2021 0.96 (L) 2.00 - 3.00 Final        EGD 8/17/2022: Irregular Z-line at GE junction.  Gastritis.  Colonoscopy 8/17/2022: Hemorrhoids.  7 mm area of protrusion in cecum.  Mild diverticulosis in sigmoid colon.  Cecum biopsy-prominent lymphoid aggregate.  Random colon biopsy-no significant pathologic change.  Duodenal biopsy-no significant pathologic change.  Antrum biopsy-mild chronic inactive gastritis.  Gastroesophageal junction biopsy-squamocolumnar mucosa with focal intestinal metaplasia.     Assessment and Plan    Diagnoses and all orders for this visit:    1. Gastritis without bleeding, unspecified chronicity, unspecified gastritis type (Primary)    2. Hemorrhoids, unspecified hemorrhoid type    3. Diverticulosis    4. Guzmán's esophagus without dysplasia    5. Irritable bowel syndrome with both constipation and diarrhea    Other orders  -     psyllium (Metamucil) 0.52 g capsule; Take 1 capsule by mouth Daily.  Dispense: 30 capsule; Refill: 5  -     Probiotic Product (Florajen Digestion) capsule; Take 1 capsule by mouth Daily for 30 days.  Dispense: 30 capsule; Refill: 5  -     pantoprazole (PROTONIX) 40 MG EC tablet; Take 1 tablet by mouth Daily.  Dispense: 90 tablet; Refill: 1      * Surgery not found *       Follow Up   Return in about 6 months (around 5/21/2023).  Patient was given instructions and counseling regarding her condition or for health maintenance advice.  Please see specific information pulled into the AVS if appropriate.

## 2022-12-01 ENCOUNTER — LAB (OUTPATIENT)
Dept: LAB | Facility: HOSPITAL | Age: 62
End: 2022-12-01

## 2022-12-01 DIAGNOSIS — E78.5 HYPERLIPIDEMIA LDL GOAL <70: ICD-10-CM

## 2022-12-01 DIAGNOSIS — I25.10 CORONARY ARTERY DISEASE INVOLVING NATIVE CORONARY ARTERY OF NATIVE HEART WITHOUT ANGINA PECTORIS: ICD-10-CM

## 2022-12-01 DIAGNOSIS — I10 ESSENTIAL HYPERTENSION: ICD-10-CM

## 2022-12-01 LAB
ALBUMIN SERPL-MCNC: 4 G/DL (ref 3.5–5.2)
ALBUMIN/GLOB SERPL: 1.5 G/DL
ALP SERPL-CCNC: 89 U/L (ref 39–117)
ALT SERPL W P-5'-P-CCNC: 13 U/L (ref 1–33)
ANION GAP SERPL CALCULATED.3IONS-SCNC: 9.1 MMOL/L (ref 5–15)
AST SERPL-CCNC: 13 U/L (ref 1–32)
BILIRUB SERPL-MCNC: 0.6 MG/DL (ref 0–1.2)
BUN SERPL-MCNC: 15 MG/DL (ref 8–23)
BUN/CREAT SERPL: 12.9 (ref 7–25)
CALCIUM SPEC-SCNC: 10 MG/DL (ref 8.6–10.5)
CHLORIDE SERPL-SCNC: 103 MMOL/L (ref 98–107)
CHOLEST SERPL-MCNC: 153 MG/DL (ref 0–200)
CO2 SERPL-SCNC: 26.9 MMOL/L (ref 22–29)
CREAT SERPL-MCNC: 1.16 MG/DL (ref 0.57–1)
EGFRCR SERPLBLD CKD-EPI 2021: 52.8 ML/MIN/1.73
GLOBULIN UR ELPH-MCNC: 2.7 GM/DL
GLUCOSE SERPL-MCNC: 113 MG/DL (ref 65–99)
HDLC SERPL-MCNC: 69 MG/DL (ref 40–60)
LDLC SERPL CALC-MCNC: 62 MG/DL (ref 0–100)
LDLC/HDLC SERPL: 0.85 {RATIO}
MAGNESIUM SERPL-MCNC: 2 MG/DL (ref 1.6–2.4)
POTASSIUM SERPL-SCNC: 4 MMOL/L (ref 3.5–5.2)
PROT SERPL-MCNC: 6.7 G/DL (ref 6–8.5)
SODIUM SERPL-SCNC: 139 MMOL/L (ref 136–145)
TRIGL SERPL-MCNC: 126 MG/DL (ref 0–150)
VLDLC SERPL-MCNC: 22 MG/DL (ref 5–40)

## 2022-12-01 PROCEDURE — 83735 ASSAY OF MAGNESIUM: CPT

## 2022-12-01 PROCEDURE — 80061 LIPID PANEL: CPT

## 2022-12-01 PROCEDURE — 80053 COMPREHEN METABOLIC PANEL: CPT

## 2022-12-01 PROCEDURE — 36415 COLL VENOUS BLD VENIPUNCTURE: CPT

## 2022-12-05 ENCOUNTER — OFFICE VISIT (OUTPATIENT)
Dept: CARDIOLOGY | Facility: CLINIC | Age: 62
End: 2022-12-05

## 2022-12-05 VITALS
WEIGHT: 206 LBS | HEART RATE: 78 BPM | DIASTOLIC BLOOD PRESSURE: 74 MMHG | HEIGHT: 65 IN | BODY MASS INDEX: 34.32 KG/M2 | SYSTOLIC BLOOD PRESSURE: 126 MMHG

## 2022-12-05 DIAGNOSIS — I25.10 CORONARY ARTERY DISEASE INVOLVING NATIVE CORONARY ARTERY OF NATIVE HEART WITHOUT ANGINA PECTORIS: Primary | ICD-10-CM

## 2022-12-05 DIAGNOSIS — I10 ESSENTIAL HYPERTENSION: ICD-10-CM

## 2022-12-05 DIAGNOSIS — E78.5 HYPERLIPIDEMIA LDL GOAL <70: ICD-10-CM

## 2022-12-05 PROBLEM — R14.0 ABDOMINAL BLOATING: Status: RESOLVED | Noted: 2022-04-15 | Resolved: 2022-12-05

## 2022-12-05 PROBLEM — R19.7 DIARRHEA: Status: RESOLVED | Noted: 2022-04-15 | Resolved: 2022-12-05

## 2022-12-05 PROBLEM — M54.50 LOW BACK PAIN: Status: ACTIVE | Noted: 2022-12-05

## 2022-12-05 PROBLEM — R10.13 EPIGASTRIC PAIN: Status: RESOLVED | Noted: 2022-04-15 | Resolved: 2022-12-05

## 2022-12-05 PROCEDURE — 99214 OFFICE O/P EST MOD 30 MIN: CPT | Performed by: INTERNAL MEDICINE

## 2022-12-05 RX ORDER — ESZOPICLONE 3 MG/1
3 TABLET, FILM COATED ORAL AS NEEDED
COMMUNITY

## 2022-12-05 RX ORDER — ASPIRIN 81 MG/1
81 TABLET ORAL DAILY
COMMUNITY

## 2022-12-05 RX ORDER — SPIRONOLACTONE 25 MG/1
25 TABLET ORAL DAILY
Qty: 90 TABLET | Refills: 3 | Status: SHIPPED | OUTPATIENT
Start: 2022-12-05

## 2022-12-05 NOTE — ASSESSMENT & PLAN NOTE
Blood pressure very well controlled.  Continue carvedilol, irbesartan and spironolactone at the current dose.  Recent labs showed creatinine 1.1 which is close to her baseline.  Potassium is within normal limits.

## 2022-12-05 NOTE — ASSESSMENT & PLAN NOTE
She is currently stable with no angina-like symptoms.  LV systolic function is preserved.  Continue aspirin, Plavix, statin and beta-blocker at the current dose.

## 2022-12-05 NOTE — ASSESSMENT & PLAN NOTE
LDL is 62, which is at goal.  Continue rosuvastatin 40 mg daily.  We will repeat lipid panel before next follow-up visit.

## 2022-12-05 NOTE — PROGRESS NOTES
CARDIOLOGY FOLLOW-UP PROGRESS NOTE        Chief Complaint  Follow-up, Hypertension, Hyperlipidemia, and Coronary Artery Disease    Subjective            Carley Deng presents to [facility] CARDIOLOGY  History of Present Illness      Ms Deng is here for routine 6-month follow-up visit for coronary artery disease.  She was previously following up with Dr. Mckeon, who recently moved to heart failure clinic.    Today patient reports feeling fine.  She has not had any recent episodes of chest pain.  She occasionally get spells, which she described as panic attacks.  There is no chest pain associated with the spells.  They usually last for few minutes.  Denies any major shortness of breath, palpitations or dizziness.  She is taking all the medicines as prescribed.  Shortness of breath significantly improved after discontinuing Brilinta.       Past History:    Coronary artery disease : Index presentation is non-STEMI on 8/12/2021, s/p angioplasty and stent placement to mid LAD artery.  RCA had a 40 to 50% proximal long stenosis.    Essential hypertension  Mixed hyperlipidemia  Irritable bowel syndrome    Medical History:  Past Medical History:   Diagnosis Date   • Abdominal bloating 4/15/2022    Added automatically from request for surgery [id number]   • Anxiety    • Guzmán esophagus 2022   • Benign essential tremor    • CHF (congestive heart failure) ([facility])    • Cholelithiasis 1984    Had Gallbladder Removed   • Depression    • Diarrhea 4/15/2022    Added automatically from request for surgery [id number]   • Diverticulitis of colon 2018?    diverticuLOSIS   • Epigastric pain 4/15/2022    Added automatically from request for surgery [id number]   • GERD (gastroesophageal reflux disease) 2021   • Hyperlipemia    • Hypertension    • Irritable bowel syndrome 1990   • Lactose intolerance 2022   • Limb swelling    • Lumbago    • Myocardial infarction (HCC)     8/2021   • Seasonal allergies        Surgical  History: has a past surgical history that includes Cholecystectomy;  section; Cardiac catheterization (Left, 2021); Colonoscopy (); Esophagogastroduodenoscopy (); Hysterectomy (); Coronary stent placement; Esophagogastroduodenoscopy (N/A, 2022); Colonoscopy (N/A, 2022); Abdominal surgery (?!); Tubal ligation (); and Upper gastrointestinal endoscopy ().     Family History: family history includes Coronary artery disease in her father and mother.     Social History: reports that she has never smoked. She has never used smokeless tobacco. She reports that she does not drink alcohol and does not use drugs.    Allergies: Eggs or egg-derived products and Erythromycin    Current Outpatient Medications on File Prior to Visit   Medication Sig   • aspirin 81 MG EC tablet Take 81 mg by mouth Daily.   • buPROPion XL (WELLBUTRIN XL) 300 MG 24 hr tablet Take 300 mg by mouth Daily.   • carvedilol (COREG) 6.25 MG tablet Take 1 tablet by mouth 2 (Two) Times a Day With Meals for 30 days.   • clopidogrel (PLAVIX) 75 MG tablet TAKE ONE TABLET BY MOUTH DAILY   • dicyclomine (BENTYL) 10 MG capsule Take 10 mg by mouth 4 (Four) Times a Day As Needed.   • eszopiclone (LUNESTA) 3 MG tablet Take 3 mg by mouth As Needed for Sleep.   • irbesartan (AVAPRO) 300 MG tablet Take 1 tablet by mouth Every Night. (Patient taking differently: Take 300 mg by mouth Daily.)   • nitroglycerin (NITROSTAT) 0.4 MG SL tablet 1 under the tongue as needed for angina, may repeat q5mins for up three doses   • pantoprazole (PROTONIX) 40 MG EC tablet Take 1 tablet by mouth Daily.   • Probiotic Product (Florajen Digestion) capsule Take 1 capsule by mouth Daily for 30 days.   • psyllium (Metamucil) 0.52 g capsule Take 1 capsule by mouth Daily.   • rosuvastatin (CRESTOR) 40 MG tablet Take 1 tablet by mouth Daily.   • simethicone (MYLICON,GAS-X) 125 MG capsule capsule 125 mg.   • vitamin D (ERGOCALCIFEROL) 1.25 MG (47521 UT)  "capsule capsule Take 50,000 Units by mouth 1 (One) Time Per Week.   •  spironolactone (ALDACTONE) 25 MG tablet Take 1 tablet by mouth Daily.         Review of Systems   Respiratory: Negative for cough, shortness of breath and wheezing.    Cardiovascular: Negative for chest pain, palpitations and leg swelling.   Gastrointestinal: Negative for nausea and vomiting.   Neurological: Negative for dizziness and syncope.   Psychiatric/Behavioral: The patient is nervous/anxious.         Objective     /74   Pulse 78   Ht 165.1 cm (65\")   Wt 93.4 kg (206 lb)   BMI 34.28 kg/m²       Physical Exam    General : Alert, awake, no acute distress  Neck : Supple, no carotid bruit, no jugular venous distention  CVS : Regular rate and rhythm, no murmur, rubs or gallops  Lungs: Clear to auscultation bilaterally, no crackles or rhonchi  Abdomen: Soft, nontender, bowel sounds heard in all 4 quadrants  Extremities: Warm, well-perfused, no pedal edema    Result Review :     The following data was reviewed by: David Cedeño MD on 12/05/2022:    CMP    CMP 4/29/22 7/21/22 12/1/22   Glucose 117 (A) 115 (A) 113 (A)   BUN 15 16 15   Creatinine 1.11 (A) 1.08 (A) 1.16 (A)   Sodium 141 138 139   Potassium 4.2 4.2 4.0   Chloride 105 103 103   Calcium 9.6 9.1 10.0   Albumin 3.90 4.10 4.00   Total Bilirubin 0.5 0.4 0.6   Alkaline Phosphatase 128 (A) 106 89   AST (SGOT) 17 14 13   ALT (SGPT) 22 12 13   (A) Abnormal value            CBC    CBC 7/21/22   WBC 6.82   RBC 4.44   Hemoglobin 13.9   Hematocrit 40.6   MCV 91.4   MCH 31.3   MCHC 34.2   RDW 12.5   Platelets 222             Lipid Panel    Lipid Panel 4/29/22 7/21/22 12/1/22   Total Cholesterol 151 155 153   Triglycerides 111 133 126   HDL Cholesterol 51 64 (A) 69 (A)   VLDL Cholesterol 20 23 22   LDL Cholesterol  80 68 62   LDL/HDL Ratio 1.53 1.01 0.85   (A) Abnormal value                 Data reviewed: Cardiology studies        Results for orders placed in visit on 09/15/21    Adult " Transthoracic Echo Complete W/ Cont if Necessary Per Protocol    Interpretation Summary  · The left ventricular cavity is borderline dilated.  · Estimated left ventricular EF was in agreement with the calculated left ventricular EF. Left ventricular ejection fraction appears to be 61 - 65%. Left ventricular systolic function is normal.  · Left ventricular diastolic function is consistent with (grade I) impaired relaxation.  · Estimated right ventricular systolic pressure from tricuspid regurgitation is normal (<35 mmHg).      Results for orders placed in visit on 09/15/21    Treadmill Stress Test    Interpretation Summary  · The patient reported shortness of breath during the stress test.  · Limited exercise capacity. No electrocardiographic evidence of exercise-induced ischemia               Assessment and Plan        Diagnoses and all orders for this visit:    1. Coronary artery disease involving native coronary artery of native heart without angina pectoris (Primary)  Assessment & Plan:  She is currently stable with no angina-like symptoms.  LV systolic function is preserved.  Continue aspirin, Plavix, statin and beta-blocker at the current dose.    Orders:  -     Lipid Panel; Future    2. Hyperlipidemia LDL goal <70  Assessment & Plan:  LDL is 62, which is at goal.  Continue rosuvastatin 40 mg daily.  We will repeat lipid panel before next follow-up visit.    Orders:  -     Lipid Panel; Future  -     Comprehensive Metabolic Panel; Future    3. Essential hypertension  Assessment & Plan:  Blood pressure very well controlled.  Continue carvedilol, irbesartan and spironolactone at the current dose.  Recent labs showed creatinine 1.1 which is close to her baseline.  Potassium is within normal limits.    Orders:  -     spironolactone (ALDACTONE) 25 MG tablet; Take 1 tablet by mouth Daily.  Dispense: 90 tablet; Refill: 3            Follow Up     Return in about 7 months (around 7/5/2023).    Patient was given  instructions and counseling regarding her condition or for health maintenance advice. Please see specific information pulled into the AVS if appropriate.

## 2022-12-28 RX ORDER — CLOPIDOGREL BISULFATE 75 MG/1
TABLET ORAL
Qty: 90 TABLET | Refills: 2 | Status: SHIPPED | OUTPATIENT
Start: 2022-12-28

## 2023-01-17 ENCOUNTER — TELEPHONE (OUTPATIENT)
Dept: GASTROENTEROLOGY | Facility: CLINIC | Age: 63
End: 2023-01-17
Payer: COMMERCIAL

## 2023-01-18 NOTE — TELEPHONE ENCOUNTER
Returned patient phone call, patient was wondering what she needed to do since Johnny Roe has left our office. I informed her that we are in the process of hiring a replacement. Right now to scheduled with the other APRN in our office the office visit would be July. Going to add patient to recall list.

## 2023-02-17 ENCOUNTER — TELEPHONE (OUTPATIENT)
Dept: GASTROENTEROLOGY | Facility: CLINIC | Age: 63
End: 2023-02-17
Payer: COMMERCIAL

## 2023-02-17 NOTE — TELEPHONE ENCOUNTER
Spoke with patient to make an appointment. The next available isn't until Aug. She was seeing Johnny. She is having a lot of pain with her Barretts/IBS/diarrhea. She went to her PCP and he gave her medication but it isn't working. She would like to see if Florence could see her sooner. Please advise.

## 2023-02-24 ENCOUNTER — TELEPHONE (OUTPATIENT)
Dept: GASTROENTEROLOGY | Facility: CLINIC | Age: 63
End: 2023-02-24
Payer: COMMERCIAL

## 2023-02-24 NOTE — TELEPHONE ENCOUNTER
Called Carley and spoke with patient regarding scheduling an appointment with Sallie Waller. Patient agreed to be scheduled and was scheduled for 04/25/2023. Patient verbalized understanding

## 2023-02-24 NOTE — TELEPHONE ENCOUNTER
Called Carley and spoke with patient regarding scheduling an appointment with Sallie Waller. Patient agreed to be scheduled and was scheduled for 04/25/2023. Patient verbalized understanding.

## 2023-03-10 ENCOUNTER — HOSPITAL ENCOUNTER (EMERGENCY)
Facility: HOSPITAL | Age: 63
Discharge: HOME | End: 2023-03-10
Attending: EMERGENCY MEDICINE | Admitting: EMERGENCY MEDICINE
Payer: COMMERCIAL

## 2023-03-10 ENCOUNTER — APPOINTMENT (OUTPATIENT)
Dept: GENERAL RADIOLOGY | Facility: HOSPITAL | Age: 63
End: 2023-03-10
Payer: COMMERCIAL

## 2023-03-10 ENCOUNTER — APPOINTMENT (OUTPATIENT)
Dept: CT IMAGING | Facility: HOSPITAL | Age: 63
End: 2023-03-10
Payer: COMMERCIAL

## 2023-03-10 VITALS
HEART RATE: 77 BPM | TEMPERATURE: 97.8 F | BODY MASS INDEX: 36.44 KG/M2 | DIASTOLIC BLOOD PRESSURE: 56 MMHG | WEIGHT: 218.7 LBS | OXYGEN SATURATION: 97 % | SYSTOLIC BLOOD PRESSURE: 117 MMHG | RESPIRATION RATE: 19 BRPM | HEIGHT: 65 IN

## 2023-03-10 DIAGNOSIS — H81.12 BENIGN PAROXYSMAL POSITIONAL VERTIGO OF LEFT EAR: Primary | ICD-10-CM

## 2023-03-10 DIAGNOSIS — R42 VERTIGO: ICD-10-CM

## 2023-03-10 LAB
ALBUMIN SERPL-MCNC: 4.1 G/DL (ref 3.5–5.2)
ALBUMIN/GLOB SERPL: 1.4 G/DL
ALP SERPL-CCNC: 97 U/L (ref 39–117)
ALT SERPL W P-5'-P-CCNC: 19 U/L (ref 1–33)
ANION GAP SERPL CALCULATED.3IONS-SCNC: 11.9 MMOL/L (ref 5–15)
AST SERPL-CCNC: 15 U/L (ref 1–32)
BACTERIA UR QL AUTO: ABNORMAL /HPF
BASOPHILS # BLD AUTO: 0.03 10*3/MM3 (ref 0–0.2)
BASOPHILS NFR BLD AUTO: 0.4 % (ref 0–1.5)
BILIRUB SERPL-MCNC: 0.6 MG/DL (ref 0–1.2)
BILIRUB UR QL STRIP: NEGATIVE
BUN SERPL-MCNC: 15 MG/DL (ref 8–23)
BUN/CREAT SERPL: 14.7 (ref 7–25)
CALCIUM SPEC-SCNC: 9.7 MG/DL (ref 8.6–10.5)
CHLORIDE SERPL-SCNC: 107 MMOL/L (ref 98–107)
CLARITY UR: CLEAR
CO2 SERPL-SCNC: 23.1 MMOL/L (ref 22–29)
COLOR UR: YELLOW
CREAT SERPL-MCNC: 1.02 MG/DL (ref 0.57–1)
DEPRECATED RDW RBC AUTO: 42.1 FL (ref 37–54)
EGFRCR SERPLBLD CKD-EPI 2021: 61.6 ML/MIN/1.73
EOSINOPHIL # BLD AUTO: 0.08 10*3/MM3 (ref 0–0.4)
EOSINOPHIL NFR BLD AUTO: 0.9 % (ref 0.3–6.2)
ERYTHROCYTE [DISTWIDTH] IN BLOOD BY AUTOMATED COUNT: 12.7 % (ref 12.3–15.4)
GLOBULIN UR ELPH-MCNC: 2.9 GM/DL
GLUCOSE SERPL-MCNC: 137 MG/DL (ref 65–99)
GLUCOSE UR STRIP-MCNC: NEGATIVE MG/DL
HCT VFR BLD AUTO: 40.5 % (ref 34–46.6)
HGB BLD-MCNC: 14.1 G/DL (ref 12–15.9)
HGB UR QL STRIP.AUTO: NEGATIVE
HOLD SPECIMEN: NORMAL
HOLD SPECIMEN: NORMAL
HYALINE CASTS UR QL AUTO: ABNORMAL /LPF
IMM GRANULOCYTES # BLD AUTO: 0.03 10*3/MM3 (ref 0–0.05)
IMM GRANULOCYTES NFR BLD AUTO: 0.4 % (ref 0–0.5)
KETONES UR QL STRIP: NEGATIVE
LEUKOCYTE ESTERASE UR QL STRIP.AUTO: ABNORMAL
LYMPHOCYTES # BLD AUTO: 0.73 10*3/MM3 (ref 0.7–3.1)
LYMPHOCYTES NFR BLD AUTO: 8.7 % (ref 19.6–45.3)
MAGNESIUM SERPL-MCNC: 1.8 MG/DL (ref 1.6–2.4)
MCH RBC QN AUTO: 31.7 PG (ref 26.6–33)
MCHC RBC AUTO-ENTMCNC: 34.8 G/DL (ref 31.5–35.7)
MCV RBC AUTO: 91 FL (ref 79–97)
MONOCYTES # BLD AUTO: 0.4 10*3/MM3 (ref 0.1–0.9)
MONOCYTES NFR BLD AUTO: 4.7 % (ref 5–12)
NEUTROPHILS NFR BLD AUTO: 7.16 10*3/MM3 (ref 1.7–7)
NEUTROPHILS NFR BLD AUTO: 84.9 % (ref 42.7–76)
NITRITE UR QL STRIP: NEGATIVE
NRBC BLD AUTO-RTO: 0 /100 WBC (ref 0–0.2)
PH UR STRIP.AUTO: 7.5 [PH] (ref 5–8)
PLATELET # BLD AUTO: 237 10*3/MM3 (ref 140–450)
PMV BLD AUTO: 9.5 FL (ref 6–12)
POTASSIUM SERPL-SCNC: 3.8 MMOL/L (ref 3.5–5.2)
PROT SERPL-MCNC: 7 G/DL (ref 6–8.5)
PROT UR QL STRIP: NEGATIVE
QT INTERVAL: 415 MS
RBC # BLD AUTO: 4.45 10*6/MM3 (ref 3.77–5.28)
RBC # UR STRIP: ABNORMAL /HPF
REF LAB TEST METHOD: ABNORMAL
SODIUM SERPL-SCNC: 142 MMOL/L (ref 136–145)
SP GR UR STRIP: 1.01 (ref 1–1.03)
SQUAMOUS #/AREA URNS HPF: ABNORMAL /HPF
TROPONIN T SERPL HS-MCNC: 10 NG/L
UROBILINOGEN UR QL STRIP: ABNORMAL
WBC # UR STRIP: ABNORMAL /HPF
WBC NRBC COR # BLD: 8.43 10*3/MM3 (ref 3.4–10.8)
WHOLE BLOOD HOLD COAG: NORMAL
WHOLE BLOOD HOLD SPECIMEN: NORMAL

## 2023-03-10 PROCEDURE — 85025 COMPLETE CBC W/AUTO DIFF WBC: CPT | Performed by: EMERGENCY MEDICINE

## 2023-03-10 PROCEDURE — 97161 PT EVAL LOW COMPLEX 20 MIN: CPT | Performed by: PHYSICAL THERAPIST

## 2023-03-10 PROCEDURE — 93005 ELECTROCARDIOGRAM TRACING: CPT

## 2023-03-10 PROCEDURE — 80053 COMPREHEN METABOLIC PANEL: CPT | Performed by: EMERGENCY MEDICINE

## 2023-03-10 PROCEDURE — 95992 CANALITH REPOSITIONING PROC: CPT | Performed by: PHYSICAL THERAPIST

## 2023-03-10 PROCEDURE — 81001 URINALYSIS AUTO W/SCOPE: CPT | Performed by: EMERGENCY MEDICINE

## 2023-03-10 PROCEDURE — 36415 COLL VENOUS BLD VENIPUNCTURE: CPT

## 2023-03-10 PROCEDURE — 70450 CT HEAD/BRAIN W/O DYE: CPT

## 2023-03-10 PROCEDURE — 83735 ASSAY OF MAGNESIUM: CPT | Performed by: EMERGENCY MEDICINE

## 2023-03-10 PROCEDURE — 96374 THER/PROPH/DIAG INJ IV PUSH: CPT

## 2023-03-10 PROCEDURE — 99284 EMERGENCY DEPT VISIT MOD MDM: CPT

## 2023-03-10 PROCEDURE — 71045 X-RAY EXAM CHEST 1 VIEW: CPT

## 2023-03-10 PROCEDURE — 25010000002 KETOROLAC TROMETHAMINE PER 15 MG: Performed by: EMERGENCY MEDICINE

## 2023-03-10 PROCEDURE — 84484 ASSAY OF TROPONIN QUANT: CPT | Performed by: EMERGENCY MEDICINE

## 2023-03-10 PROCEDURE — 93005 ELECTROCARDIOGRAM TRACING: CPT | Performed by: EMERGENCY MEDICINE

## 2023-03-10 RX ORDER — KETOROLAC TROMETHAMINE 30 MG/ML
30 INJECTION, SOLUTION INTRAMUSCULAR; INTRAVENOUS ONCE
Status: COMPLETED | OUTPATIENT
Start: 2023-03-10 | End: 2023-03-10

## 2023-03-10 RX ORDER — LORAZEPAM 0.5 MG/1
1 TABLET ORAL ONCE
Status: COMPLETED | OUTPATIENT
Start: 2023-03-10 | End: 2023-03-10

## 2023-03-10 RX ORDER — SODIUM CHLORIDE 0.9 % (FLUSH) 0.9 %
10 SYRINGE (ML) INJECTION AS NEEDED
Status: DISCONTINUED | OUTPATIENT
Start: 2023-03-10 | End: 2023-03-10 | Stop reason: HOSPADM

## 2023-03-10 RX ORDER — BUSPIRONE HYDROCHLORIDE 5 MG/1
5 TABLET ORAL 2 TIMES DAILY
COMMUNITY

## 2023-03-10 RX ORDER — MECLIZINE HYDROCHLORIDE 25 MG/1
25 TABLET ORAL ONCE
Status: COMPLETED | OUTPATIENT
Start: 2023-03-10 | End: 2023-03-10

## 2023-03-10 RX ORDER — LORAZEPAM 0.5 MG/1
1 TABLET ORAL EVERY 6 HOURS PRN
Status: DISCONTINUED | OUTPATIENT
Start: 2023-03-10 | End: 2023-03-10

## 2023-03-10 RX ORDER — KETOROLAC TROMETHAMINE 10 MG/1
10 TABLET, FILM COATED ORAL EVERY 6 HOURS PRN
Qty: 20 TABLET | Refills: 0 | Status: SHIPPED | OUTPATIENT
Start: 2023-03-10

## 2023-03-10 RX ORDER — MECLIZINE HYDROCHLORIDE 25 MG/1
25 TABLET ORAL 3 TIMES DAILY PRN
Qty: 30 TABLET | Refills: 0 | Status: SHIPPED | OUTPATIENT
Start: 2023-03-10

## 2023-03-10 RX ORDER — LORAZEPAM 2 MG/ML
1 INJECTION INTRAMUSCULAR ONCE
Status: DISCONTINUED | OUTPATIENT
Start: 2023-03-10 | End: 2023-03-10

## 2023-03-10 RX ORDER — NITROFURANTOIN 25; 75 MG/1; MG/1
100 CAPSULE ORAL 2 TIMES DAILY
Qty: 10 CAPSULE | Refills: 0 | Status: SHIPPED | OUTPATIENT
Start: 2023-03-10

## 2023-03-10 RX ORDER — KETOROLAC TROMETHAMINE 30 MG/ML
30 INJECTION, SOLUTION INTRAMUSCULAR; INTRAVENOUS ONCE
Status: DISCONTINUED | OUTPATIENT
Start: 2023-03-10 | End: 2023-03-10

## 2023-03-10 RX ORDER — FLUCONAZOLE 150 MG/1
150 TABLET ORAL ONCE
Qty: 1 TABLET | Refills: 0 | Status: SHIPPED | OUTPATIENT
Start: 2023-03-10 | End: 2023-03-10

## 2023-03-10 RX ADMIN — KETOROLAC TROMETHAMINE 30 MG: 30 INJECTION, SOLUTION INTRAMUSCULAR; INTRAVENOUS at 09:15

## 2023-03-10 RX ADMIN — MECLIZINE HYDROCHLORIDE 25 MG: 25 TABLET ORAL at 08:42

## 2023-03-10 RX ADMIN — LORAZEPAM 1 MG: 0.5 TABLET ORAL at 09:11

## 2023-03-10 NOTE — THERAPY EVALUATION
Patient Name: Carley Deng  : 1960    MRN: [id number]                              Today's Date: 3/10/2023       Admit Date: 3/10/2023    Visit Dx:     ICD-10-CM ICD-9-CM   1. Benign paroxysmal positional vertigo of left ear  H81.12 386.11     Patient Active Problem List   Diagnosis   • Hyperglycemia   • Essential hypertension   • Hyperlipidemia LDL goal <70   • Coronary artery disease involving native coronary artery of native heart   • Irritable bowel syndrome with diarrhea   • Low back pain     Past Medical History:   Diagnosis Date   • Abdominal bloating 4/15/2022    Added automatically from request for surgery [id number]   • Anxiety    • Guzmán esophagus    • Benign essential tremor    • CHF (congestive heart failure) ([facility])    • Cholelithiasis     Had Gallbladder Removed   • Depression    • Diarrhea 4/15/2022    Added automatically from request for surgery [id number]   • Diverticulitis of colon ?    diverticuLOSIS   • Epigastric pain 4/15/2022    Added automatically from request for surgery [id number]   • GERD (gastroesophageal reflux disease)    • Hyperlipemia    • Hypertension    • Irritable bowel syndrome    • Lactose intolerance    • Limb swelling    • Lumbago    • Myocardial infarction ([facility])     2021   • Seasonal allergies      Past Surgical History:   Procedure Laterality Date   • ABDOMINAL SURGERY  ?!    Gallbladder   • CARDIAC CATHETERIZATION Left 2021    Procedure: Left Heart Cath with coronary angiography and possible right heart cath. Possible PTCA/stent Insertion.;  Surgeon: Dc Mckeon MD;  Location: [facility] CATH INVASIVE LOCATION;  Service: Cardiovascular;  Laterality: Left;   •  SECTION     • CHOLECYSTECTOMY     • COLONOSCOPY     • COLONOSCOPY N/A 2022    Procedure: COLONOSCOPY WITH BIOPSIES;  Surgeon: Willow Paniagua MD;  Location: [facility] ENDOSCOPY;  Service: Gastroenterology;  Laterality: N/A;  HEMORRHOIDS  DIVERTICULOSIS   •  CORONARY STENT PLACEMENT     • ENDOSCOPY  2010   • ENDOSCOPY N/A 08/17/2022    Procedure: ESOPHAGOGASTRODUODENOSCOPY WITH BIOPSIES;  Surgeon: Willow Paniagua MD;  Location: [facility] ENDOSCOPY;  Service: Gastroenterology;  Laterality: N/A;  GASTRITIS   • HYSTERECTOMY  1990   • TUBAL ABDOMINAL LIGATION  1984   • UPPER GASTROINTESTINAL ENDOSCOPY  2022      General Information     Row Name 03/10/23 1007          Physical Therapy Time and Intention    Document Type evaluation  -LR     Mode of Treatment individual therapy  -LR     Row Name 03/10/23 1007          General Information    Patient Profile Reviewed yes  -LR     Prior Level of Function independent:  -LR           User Key  (r) = Recorded By, (t) = Taken By, (c) = Cosigned By    Initials Name Provider Type    LR Cindy Phipps, PT Physical Therapist              History: Pt reports she stood up this morning and the room started spinning and she fell back.  She laid there for a bit and then tried tos it up again but fell back.  EMS brought her to the ER.  Pt denies nausea, tinnitus, and imbalance.  She reports pressure in her left ear.  She also states she had some dental work done on the left side recently and has some facial swelling.    Objective:    Special Tests:  L Carpenter Halpike: no nystagmus noted but pt reports spinning  R Carpenter Halpike: NT  L horizontal canal test: NT  R horizontal canal test: NT    Assessment/Plan:   Pt presents with a diagnosis of dizziness and has L BPPV that are limiting her ability to sit, stand, and perform functional activities.  Pt is very anxious and fearful of moving throughout the evaluation.  The ayana halpike had to be performed very slowly due to pt's anxiety, which is likely why nystagmus was not noted during the test.  Pt was able to tolerate the epley maneuver to the left side one time.  Following the epley she did have improvements in her dizziness/spinning and was able to tolerate sitting up in the bed and  standing.    Goals:   LTG 1: The patient will be independent in HEP in order to improve ability to perform functional activities.  STATUS: Met    Interventions:   Canalith Repositioning Maneuver: performed one time to the left side    Discharge Recommendations: referral to outpatient physical therapy for treatment of vertigo   Outcome Measures     Row Name 03/10/23 1007          Optimal Instrument    Optimal Instrument Optimal - 3  -LR     Lying Flat 4  -LR     Rolling Over 3  -LR     Moving - lying to sitting 4  -LR     From the list, choose the 3 activities you would most like to be able to do without any difficulty Moving -lying to sitting;Lying flat;Rolling over  -LR     Total Score Optimal - 3 7  -LR     Row Name 03/10/23 1007          Functional Assessment    Outcome Measure Options Optimal Instrument  -LR           User Key  (r) = Recorded By, (t) = Taken By, (c) = Cosigned By    Initials Name Provider Type    LR Cindy Phipps, PT Physical Therapist                 03/10/23 1008   PT Evaluation Complexity   History, PT Evaluation Complexity 1-2 personal factors and/or comorbidities   Examination of Body Systems (PT Eval Complexity) 1-2 elements   Clinical Presentation (PT Evaluation Complexity) stable   Clinical Decision Making (PT Evaluation Complexity) low complexity   Overall Complexity (PT Evaluation Complexity) low complexity      Time Calculation:    PT Charges     Row Name 03/10/23 1008             Time Calculation    PT Received On 03/10/23  -LR         Time Calculation- PT    Total Timed Code Minutes- PT 50 minute(s)  -LR         Untimed Charges    PT Eval/Re-eval Minutes 32  -LR      PT Canalith Repositioning 18  -LR         Total Minutes    Untimed Charges Total Minutes 50  -LR       Total Minutes 50  -LR            User Key  (r) = Recorded By, (t) = Taken By, (c) = Cosigned By    Initials Name Provider Type    LR Cindy Phipps, PT Physical Therapist              Therapy Charges for Today      Code Description Service Date Service Provider Modifiers Qty    58718743129 HC PT EVAL LOW COMPLEXITY 3 3/10/2023 Cindy Phipps, PT GP 1    52137679443 HC PT CANALITH REPOSITIONING PER DAY 3/10/2023 Cindy Phipps, PT GP 1          PT G-Codes  Outcome Measure Options: Optimal Instrument       Cindy Phipps, PT  3/10/2023

## 2023-03-10 NOTE — ED PROVIDER NOTES
Time: 7:33 AM EST  Date of encounter:  3/10/2023  Independent Historian/Clinical History and Information was obtained by:   Patient  Chief Complaint: dizziness    History is limited by: N/A    History of Present Illness:  Patient is a 62 y.o. year old female who presents to the emergency department for evaluation of dizziness that started this morning when she got up to go to the bathroom.  She said when she stood she felt like a hamster in a ball and fell backwards onto her bed.  She says she has had vertigo off and on for the past 6 years, but it has never happened like that before.  She says usually it just feels like the room is spinning.  She has been seen by an ear nose and throat doctor and was told that her left eustachian tube does not drain properly.  She says she never lays on her left side because of it.  She had a left upper molar root canal years ago that improved her symptoms and she got immediate relief of the pressure.  She says she recently had another root canal and her symptoms have returned.  She does not take meclizine or any other medication for her vertigo.      Patient Care Team  Primary Care Provider: Robb Davalos MD    Past Medical History:     Allergies   Allergen Reactions   • Eggs Or Egg-Derived Products Anaphylaxis   • Erythromycin Unknown - High Severity     Past Medical History:   Diagnosis Date   • Abdominal bloating 4/15/2022    Added automatically from request for surgery [id number]   • Anxiety    • Guzmán esophagus 2022   • Benign essential tremor    • CHF (congestive heart failure) ([facility])    • Cholelithiasis 1984    Had Gallbladder Removed   • Depression    • Diarrhea 4/15/2022    Added automatically from request for surgery [id number]   • Diverticulitis of colon 2018?    diverticuLOSIS   • Epigastric pain 4/15/2022    Added automatically from request for surgery [id number]   • GERD (gastroesophageal reflux disease) 2021   • Hyperlipemia    • Hypertension    • Irritable bowel  syndrome    • Lactose intolerance    • Limb swelling    • Lumbago    • Myocardial infarction (HCC)     2021   • Seasonal allergies      Past Surgical History:   Procedure Laterality Date   • ABDOMINAL SURGERY  ?!    Gallbladder   • CARDIAC CATHETERIZATION Left 2021    Procedure: Left Heart Cath with coronary angiography and possible right heart cath. Possible PTCA/stent Insertion.;  Surgeon: Dc Mckeon MD;  Location: [facility] CATH INVASIVE LOCATION;  Service: Cardiovascular;  Laterality: Left;   •  SECTION     • CHOLECYSTECTOMY     • COLONOSCOPY     • COLONOSCOPY N/A 2022    Procedure: COLONOSCOPY WITH BIOPSIES;  Surgeon: Willow Paniagua MD;  Location: [facility] ENDOSCOPY;  Service: Gastroenterology;  Laterality: N/A;  HEMORRHOIDS  DIVERTICULOSIS   • CORONARY STENT PLACEMENT     • ENDOSCOPY     • ENDOSCOPY N/A 2022    Procedure: ESOPHAGOGASTRODUODENOSCOPY WITH BIOPSIES;  Surgeon: Willow Paniagua MD;  Location: [facility] ENDOSCOPY;  Service: Gastroenterology;  Laterality: N/A;  GASTRITIS   • HYSTERECTOMY     • TUBAL ABDOMINAL LIGATION     • UPPER GASTROINTESTINAL ENDOSCOPY       Family History   Problem Relation Age of Onset   • Coronary artery disease Mother    • Coronary artery disease Father    • Malig Hyperthermia Neg Hx        Home Medications:  Prior to Admission medications    Medication Sig Start Date End Date Taking? Authorizing Provider   aspirin 81 MG EC tablet Take 81 mg by mouth Daily.    Provider, MD Josh   buPROPion XL (WELLBUTRIN XL) 300 MG 24 hr tablet Take 300 mg by mouth Daily.    Provider, MD Josh   carvedilol (COREG) 6.25 MG tablet Take 1 tablet by mouth 2 (Two) Times a Day With Meals for 30 days. 22  Dc Mckeon MD   clopidogrel (PLAVIX) 75 MG tablet TAKE ONE TABLET BY MOUTH DAILY 22   Dc Mckeon MD   dicyclomine (BENTYL) 10 MG capsule Take 10 mg by mouth 4 (Four) Times a Day As Needed.  "3/25/22   Josh Sherwood MD   eszopiclone (LUNESTA) 3 MG tablet Take 3 mg by mouth As Needed for Sleep.    Josh Sherwood MD   irbesartan (AVAPRO) 300 MG tablet Take 1 tablet by mouth Every Night.  Patient taking differently: Take 300 mg by mouth Daily. 5/2/22   Dc Mckeon MD   nitroglycerin (NITROSTAT) 0.4 MG SL tablet 1 under the tongue as needed for angina, may repeat q5mins for up three doses 8/26/21   Dc Mckeon MD   pantoprazole (PROTONIX) 40 MG EC tablet Take 1 tablet by mouth Daily. 11/21/22   Iman Roe APRN   psyllium (Metamucil) 0.52 g capsule Take 1 capsule by mouth Daily. 11/21/22 11/21/23  Iman Roe APRN   rosuvastatin (CRESTOR) 40 MG tablet Take 1 tablet by mouth Daily. 5/2/22   Dc Mckeon MD   simethicone (MYLICON,GAS-X) 125 MG capsule capsule 125 mg. 12/27/21   Josh Sherwood MD   spironolactone (ALDACTONE) 25 MG tablet Take 1 tablet by mouth Daily. 12/5/22   David Cedeño MD   vitamin D (ERGOCALCIFEROL) 1.25 MG (53367 UT) capsule capsule Take 50,000 Units by mouth 1 (One) Time Per Week. 3/25/22   Josh Sherwood MD        Social History:   Social History     Tobacco Use   • Smoking status: Never   • Smokeless tobacco: Never   Vaping Use   • Vaping Use: Never used   Substance Use Topics   • Alcohol use: Never   • Drug use: Never         Review of Systems:  Review of Systems   Gastrointestinal: Negative for nausea and vomiting.   Musculoskeletal: Positive for gait problem.   Neurological: Positive for dizziness. Negative for headaches.        Physical Exam:  /56   Pulse 77   Temp 97.8 °F (36.6 °C) (Oral)   Resp 19   Ht 165.1 cm (65\")   Wt 99.2 kg (218 lb 11.1 oz)   LMP  (LMP Unknown)   SpO2 97%   BMI 36.39 kg/m²     Physical Exam  Constitutional:       General: She is not in acute distress.     Appearance: She is obese. She is not ill-appearing or toxic-appearing.   Cardiovascular:      Rate and Rhythm: Normal rate. "   Pulmonary:      Effort: Pulmonary effort is normal. No respiratory distress.   Musculoskeletal:         General: No tenderness.   Neurological:      Mental Status: She is alert and oriented to person, place, and time.                  Procedures:  Procedures      Medical Decision Making:      Comorbidities that affect care:    Benign essential tremor, anxiety, depression, high cholesterol, Guzmán's esophagus, GERD, IBS, lactose intolerance, Congestive Heart Failure, Hypertension, Obesity    External Notes reviewed:    None      The following orders were placed and all results were independently analyzed by me:  Orders Placed This Encounter   Procedures   • XR Chest 1 View   • CT Head Without Contrast   • [place] Draw   • Comprehensive Metabolic Panel   • Single High Sensitivity Troponin T   • Magnesium   • Urinalysis With Culture If Indicated -   • CBC Auto Differential   • Urinalysis, Microscopic Only - Urine, Clean Catch   • Ambulatory Referral to Physical Therapy Evaluate and treat   • Undress & Gown   • Continuous Pulse Oximetry   • Vital Signs   • Ambulate patient   • PT Plan of Care Cert / Re-Cert   • ECG 12 Lead ED Triage Standing Order; Weak / Dizzy / AMS   • CBC & Differential   • Green Top (Gel)   • Lavender Top   • Gold Top - SST   • Light Blue Top       Medications Given in the Emergency Department:  Medications   meclizine (ANTIVERT) tablet 25 mg (25 mg Oral Given 3/10/23 0842)   ketorolac (TORADOL) injection 30 mg (30 mg Intravenous Given 3/10/23 0915)   LORazepam (ATIVAN) tablet 1 mg (1 mg Oral Given 3/10/23 0911)        ED Course:         Labs:    Lab Results (last 24 hours)     ** No results found for the last 24 hours. **           Imaging:    No Radiology Exams Resulted Within Past 24 Hours      Differential Diagnosis and Discussion:    Dizziness: Based on the patient's history, signs, and symptoms, the diffential diagnosis includes but is not limited to meningitis, stroke, sepsis, subarachnoid  hemorrhage, intracranial bleeding, encephalitis, vertigo, electrolyte imbalance, and metabolic disorders.    All labs were reviewed and interpreted by me.  All X-rays were independently reviewed by me.  EKG was interpreted by me.  EKG was interpreted by supervising attending.  CT scan radiology interpretation was reviewed by me.    [facility]         Patient Care Considerations:    CONSULT: I considered consulting Neurology, however It was not indicated.      Consultants/Shared Management Plan:    None    Social Determinants of Health:    Patient is independent, reliable, and has access to care.       Disposition and Care Coordination:    Discharged: The patient is suitable and stable for discharge with no need for consideration of observation or admission.    I have explained the patient´s condition, diagnoses and treatment plan based on the information available to me at this time. I have answered questions and addressed any concerns. The patient has a good  understanding of the patient´s diagnosis, condition, and treatment plan as can be expected at this point. The vital signs have been stable. The patient´s condition is stable and appropriate for discharge from the emergency department.      The patient will pursue further outpatient evaluation with the primary care physician or other designated or consulting physician as outlined in the discharge instructions. They are agreeable to this plan of care and follow-up instructions have been explained in detail. The patient has received these instructions in written format and have expressed an understanding of the discharge instructions. The patient is aware that any significant change in condition or worsening of symptoms should prompt an immediate return to this or the closest emergency department or call to 911.    Final diagnoses:   Vertigo        ED Disposition     ED Disposition   Discharge    Condition   Stable    Comment   --             This medical record created  using voice recognition software.           Natali Parada, SRAVANTHI  03/12/23 0816

## 2023-03-10 NOTE — ED TRIAGE NOTES
"Pt states she woke up at 0500 and stood up and the room was spinning so bad that she fell back in bed, then attempted to stand up again 5 minutes later and fell back into bed again. Pt says she feels like she is \"rolling in a hamster ball\" when she stands up or changes position. Pt is laying on her right side and this position is the most comfortable for her.  "

## 2023-03-20 ENCOUNTER — LAB (OUTPATIENT)
Dept: LAB | Facility: HOSPITAL | Age: 63
End: 2023-03-20
Payer: COMMERCIAL

## 2023-03-20 ENCOUNTER — TRANSCRIBE ORDERS (OUTPATIENT)
Dept: LAB | Facility: HOSPITAL | Age: 63
End: 2023-03-20
Payer: COMMERCIAL

## 2023-03-20 DIAGNOSIS — I25.2 OLD MYOCARDIAL INFARCTION: ICD-10-CM

## 2023-03-20 DIAGNOSIS — F34.1 DYSTHYMIC DISORDER: ICD-10-CM

## 2023-03-20 DIAGNOSIS — M51.37 DEGENERATION OF LUMBAR OR LUMBOSACRAL INTERVERTEBRAL DISC: ICD-10-CM

## 2023-03-20 DIAGNOSIS — F33.1 MAJOR DEPRESSIVE DISORDER, RECURRENT EPISODE, MODERATE: ICD-10-CM

## 2023-03-20 DIAGNOSIS — E55.9 VITAMIN D DEFICIENCY: ICD-10-CM

## 2023-03-20 DIAGNOSIS — K58.2 IRRITABLE BOWEL SYNDROME WITH CONSTIPATION AND DIARRHEA: ICD-10-CM

## 2023-03-20 DIAGNOSIS — I25.10 DISEASE OF CARDIOVASCULAR SYSTEM: Primary | ICD-10-CM

## 2023-03-20 DIAGNOSIS — E78.5 HYPERLIPIDEMIA, UNSPECIFIED HYPERLIPIDEMIA TYPE: ICD-10-CM

## 2023-03-20 DIAGNOSIS — I25.10 DISEASE OF CARDIOVASCULAR SYSTEM: ICD-10-CM

## 2023-03-20 LAB
25(OH)D3 SERPL-MCNC: 27.1 NG/ML (ref 30–100)
ALBUMIN SERPL-MCNC: 4 G/DL (ref 3.5–5.2)
ALBUMIN/GLOB SERPL: 1.5 G/DL
ALP SERPL-CCNC: 145 U/L (ref 39–117)
ALT SERPL W P-5'-P-CCNC: 220 U/L (ref 1–33)
ANION GAP SERPL CALCULATED.3IONS-SCNC: 8 MMOL/L (ref 5–15)
AST SERPL-CCNC: 70 U/L (ref 1–32)
BILIRUB SERPL-MCNC: 0.6 MG/DL (ref 0–1.2)
BUN SERPL-MCNC: 16 MG/DL (ref 8–23)
BUN/CREAT SERPL: 15.2 (ref 7–25)
CALCIUM SPEC-SCNC: 8.9 MG/DL (ref 8.6–10.5)
CHLORIDE SERPL-SCNC: 108 MMOL/L (ref 98–107)
CHOLEST SERPL-MCNC: 148 MG/DL (ref 0–200)
CK SERPL-CCNC: 54 U/L (ref 20–180)
CO2 SERPL-SCNC: 26 MMOL/L (ref 22–29)
CREAT SERPL-MCNC: 1.05 MG/DL (ref 0.57–1)
CRP SERPL-MCNC: 0.45 MG/DL (ref 0–0.5)
EGFRCR SERPLBLD CKD-EPI 2021: 59.5 ML/MIN/1.73
FOLATE SERPL-MCNC: 14.7 NG/ML (ref 4.78–24.2)
GLOBULIN UR ELPH-MCNC: 2.7 GM/DL
GLUCOSE SERPL-MCNC: 129 MG/DL (ref 65–99)
HDLC SERPL-MCNC: 74 MG/DL (ref 40–60)
LDLC SERPL CALC-MCNC: 55 MG/DL (ref 0–100)
LDLC/HDLC SERPL: 0.7 {RATIO}
POTASSIUM SERPL-SCNC: 4.3 MMOL/L (ref 3.5–5.2)
PROT SERPL-MCNC: 6.7 G/DL (ref 6–8.5)
SODIUM SERPL-SCNC: 142 MMOL/L (ref 136–145)
TRIGL SERPL-MCNC: 110 MG/DL (ref 0–150)
VIT B12 BLD-MCNC: 397 PG/ML (ref 211–946)
VLDLC SERPL-MCNC: 19 MG/DL (ref 5–40)

## 2023-03-20 PROCEDURE — 82306 VITAMIN D 25 HYDROXY: CPT

## 2023-03-20 PROCEDURE — 82550 ASSAY OF CK (CPK): CPT

## 2023-03-20 PROCEDURE — 80061 LIPID PANEL: CPT

## 2023-03-20 PROCEDURE — 80053 COMPREHEN METABOLIC PANEL: CPT

## 2023-03-20 PROCEDURE — 86140 C-REACTIVE PROTEIN: CPT

## 2023-03-20 PROCEDURE — 36415 COLL VENOUS BLD VENIPUNCTURE: CPT

## 2023-03-20 PROCEDURE — 82607 VITAMIN B-12: CPT

## 2023-03-20 PROCEDURE — 82746 ASSAY OF FOLIC ACID SERUM: CPT

## 2023-04-07 RX ORDER — ROSUVASTATIN CALCIUM 40 MG/1
TABLET, COATED ORAL
Qty: 90 TABLET | Refills: 3 | Status: SHIPPED | OUTPATIENT
Start: 2023-04-07

## 2023-04-25 ENCOUNTER — TELEPHONE (OUTPATIENT)
Dept: GASTROENTEROLOGY | Facility: CLINIC | Age: 63
End: 2023-04-25
Payer: COMMERCIAL

## 2023-04-25 ENCOUNTER — OFFICE VISIT (OUTPATIENT)
Dept: GASTROENTEROLOGY | Facility: CLINIC | Age: 63
End: 2023-04-25
Payer: COMMERCIAL

## 2023-04-25 ENCOUNTER — LAB (OUTPATIENT)
Dept: LAB | Facility: HOSPITAL | Age: 63
End: 2023-04-25
Payer: COMMERCIAL

## 2023-04-25 VITALS
BODY MASS INDEX: 35.69 KG/M2 | HEIGHT: 65 IN | DIASTOLIC BLOOD PRESSURE: 71 MMHG | HEART RATE: 87 BPM | SYSTOLIC BLOOD PRESSURE: 128 MMHG | WEIGHT: 214.2 LBS

## 2023-04-25 DIAGNOSIS — K21.9 GASTROESOPHAGEAL REFLUX DISEASE WITHOUT ESOPHAGITIS: ICD-10-CM

## 2023-04-25 DIAGNOSIS — K22.70 BARRETT'S ESOPHAGUS WITHOUT DYSPLASIA: Primary | ICD-10-CM

## 2023-04-25 DIAGNOSIS — K58.2 IRRITABLE BOWEL SYNDROME WITH BOTH CONSTIPATION AND DIARRHEA: ICD-10-CM

## 2023-04-25 DIAGNOSIS — R79.89 ELEVATED LIVER FUNCTION TESTS: ICD-10-CM

## 2023-04-25 LAB
ALBUMIN SERPL-MCNC: 4.4 G/DL (ref 3.5–5.2)
ALBUMIN/GLOB SERPL: 1.6 G/DL
ALP SERPL-CCNC: 94 U/L (ref 39–117)
ALT SERPL W P-5'-P-CCNC: 18 U/L (ref 1–33)
ANION GAP SERPL CALCULATED.3IONS-SCNC: 9 MMOL/L (ref 5–15)
AST SERPL-CCNC: 14 U/L (ref 1–32)
BILIRUB SERPL-MCNC: 0.8 MG/DL (ref 0–1.2)
BUN SERPL-MCNC: 19 MG/DL (ref 8–23)
BUN/CREAT SERPL: 13.8 (ref 7–25)
CALCIUM SPEC-SCNC: 10 MG/DL (ref 8.6–10.5)
CHLORIDE SERPL-SCNC: 109 MMOL/L (ref 98–107)
CO2 SERPL-SCNC: 24 MMOL/L (ref 22–29)
CREAT SERPL-MCNC: 1.38 MG/DL (ref 0.57–1)
EGFRCR SERPLBLD CKD-EPI 2021: 42.8 ML/MIN/1.73
GLOBULIN UR ELPH-MCNC: 2.7 GM/DL
GLUCOSE SERPL-MCNC: 145 MG/DL (ref 65–99)
POTASSIUM SERPL-SCNC: 4.4 MMOL/L (ref 3.5–5.2)
PROT SERPL-MCNC: 7.1 G/DL (ref 6–8.5)
SODIUM SERPL-SCNC: 142 MMOL/L (ref 136–145)

## 2023-04-25 PROCEDURE — 80053 COMPREHEN METABOLIC PANEL: CPT | Performed by: NURSE PRACTITIONER

## 2023-04-25 PROCEDURE — 99214 OFFICE O/P EST MOD 30 MIN: CPT | Performed by: NURSE PRACTITIONER

## 2023-04-25 PROCEDURE — 36415 COLL VENOUS BLD VENIPUNCTURE: CPT | Performed by: NURSE PRACTITIONER

## 2023-04-25 RX ORDER — LINACLOTIDE 72 UG/1
1 CAPSULE, GELATIN COATED ORAL DAILY
COMMUNITY
Start: 2023-01-24 | End: 2023-04-25

## 2023-04-25 RX ORDER — LIDOCAINE HYDROCHLORIDE 20 MG/ML
SOLUTION OROPHARYNGEAL
COMMUNITY
Start: 2023-02-27

## 2023-04-25 RX ORDER — PANTOPRAZOLE SODIUM 40 MG/1
40 TABLET, DELAYED RELEASE ORAL DAILY
Qty: 90 TABLET | Refills: 3 | Status: SHIPPED | OUTPATIENT
Start: 2023-04-25

## 2023-04-25 NOTE — TELEPHONE ENCOUNTER
4/25/2023    Dear Dr. Cedeño,     Patient: Carley Deng   YOB: 1960        This patient is waiting to have an Esophagogastroduodenoscopy which I will perform at [facility] on 06/23/2023.     Our records indicate this patient is currently taking Plavix. This procedure requires the patient to suspend their anticoagulant medication prior to surgery.     Please respond to this request noting your recommendations. You may contact our office at [phone] Option 3 with any questions. I appreciate your prompt response in this matter.     Please return this form to our office as soon as possible to [phone]    ____ I approve my patient to stop taking their Anticoagulant Therapy medication 5 days prior to the scheduled procedure.    ____ I do NOT approve my patient to stop taking their Anticoagulant Therapy medication at this time.    ____ I approve my patient from a cardiac standpoint.    ____ I do NOT approve my patient from a cardiac standpoint at this time.    Please specify clearance expiration date:_____________________________    Approving physician name (please print):     _____________________________________________    Approving physician signature:     ________________________________    Date:________________        Sincerely,  [facility]   Gastroenterology -

## 2023-04-25 NOTE — PROGRESS NOTES
Chief Complaint   Irritable Bowel Syndrome    History of Present Illness       Carley Deng is a 62 y.o. female who presents to [facility] GASTROENTEROLOGY for follow-up For constipation.  She is new to me today.  She was last seen in the office by nurse practitioner Johnny Roe on 11/21/2022.      Alternates between constipation and diarrhea. Tried taking Linzess but it caused diarrhea so she quit taking it. Has been taking metamucil but admits she doesn't take it faithfully. Will take bentyl PRN. Hasn't needed it lately.  She admits her IBS is worse when stress is worse.  She leads a very stressful job.     Has a history of GERD/gastritis/Guzmán's esophagus without dysplasia---has been doing better with protonix 40 mg daily. Denies any dysphagia, abd pain, N&V, rectal bleeding or melena. Appetite is good. Weight is up 8 lbs since December.     Her last EGD and colonoscopy done by Dr. Paniagua was on 8/17/2022.  recall colonoscopy in 10 years.  Surveillance EGD in 1 year.    Has a history of MI/CAD with cardiac stents and is on Plavix.    Denies any significant GI family history--    Hx elevated liver enzymes---newly dx this past March after taking multiple ABX     Has history of cholecystectomy    Results       Result Review :       CMP        12/1/2022    07:07 3/10/2023    09:34 3/20/2023    07:17   CMP   Glucose 113   137   129     BUN 15   15   16     Creatinine 1.16   1.02   1.05     EGFR 52.8   61.6   59.5     Sodium 139   142   142     Potassium 4.0   3.8   4.3     Chloride 103   107   108     Calcium 10.0   9.7   8.9     Total Protein 6.7   7.0   6.7     Albumin 4.00   4.1   4.0     Globulin 2.7   2.9   2.7     Total Bilirubin 0.6   0.6   0.6     Alkaline Phosphatase 89   97   145     AST (SGOT) 13   15   70     ALT (SGPT) 13   19   220     Albumin/Globulin Ratio 1.5   1.4   1.5     BUN/Creatinine Ratio 12.9   14.7   15.2     Anion Gap 9.1   11.9   8.0       CBC        7/21/2022     07:33 3/10/2023    09:34   CBC   WBC 6.82   8.43     RBC 4.44   4.45     Hemoglobin 13.9   14.1     Hematocrit 40.6   40.5     MCV 91.4   91.0     MCH 31.3   31.7     MCHC 34.2   34.8     RDW 12.5   12.7     Platelets 222   237                   Her last EGD and colonoscopy done by Dr. Paniagua was on 2022.  EGD showed an irregular Z-line with gastritis.  Colonoscopy showed mild diverticulosis with a 7 mm area of protrusion in the cecum.  Biopsy to evaluate for polyp tissue.  Nonthrombosed external hemorrhoids.  Otherwise normal exam.  Path positive for mild chronic inactive gastritis and Guzmán's esophagus without dysplasia.  Recall in 10 years.  Surveillance EGD in 1 year.    Past Medical History       Past Medical History:   Diagnosis Date   • Abdominal bloating 04/15/2022    Added automatically from request for surgery [id number]   • Anxiety    • Guzmán esophagus    • Benign essential tremor    • CHF (congestive heart failure)    • Cholelithiasis     Had Gallbladder Removed   • Coronary artery disease     Heart Attack - Aug. 2022   • Depression    • Diarrhea 04/15/2022    Added automatically from request for surgery [id number]   • Diverticulitis of colon ?    diverticuLOSIS   • Epigastric pain 04/15/2022    Added automatically from request for surgery [id number]   • GERD (gastroesophageal reflux disease)    • Hyperlipemia    • Hypertension    • Irritable bowel syndrome    • Lactose intolerance    • Limb swelling    • Lumbago    • Myocardial infarction     2021   • Seasonal allergies        Past Surgical History:   Procedure Laterality Date   • ABDOMINAL SURGERY  ?!    Gallbladder   • CARDIAC CATHETERIZATION Left 2021    Procedure: Left Heart Cath with coronary angiography and possible right heart cath. Possible PTCA/stent Insertion.;  Surgeon: Dc Mckeon MD;  Location: [facility] INVASIVE LOCATION;  Service: Cardiovascular;  Laterality: Left;   •  SECTION     •  CHOLECYSTECTOMY     • COLONOSCOPY  2000   • COLONOSCOPY N/A 08/17/2022    Procedure: COLONOSCOPY WITH BIOPSIES;  Surgeon: Willow Paniagua MD;  Location: [facility] ENDOSCOPY;  Service: Gastroenterology;  Laterality: N/A;  HEMORRHOIDS  DIVERTICULOSIS   • CORONARY STENT PLACEMENT     • ENDOSCOPY  2010   • ENDOSCOPY N/A 08/17/2022    Procedure: ESOPHAGOGASTRODUODENOSCOPY WITH BIOPSIES;  Surgeon: Willow Paniagua MD;  Location: [facility] ENDOSCOPY;  Service: Gastroenterology;  Laterality: N/A;  GASTRITIS   • HYSTERECTOMY  1990   • TUBAL ABDOMINAL LIGATION  1984   • UPPER GASTROINTESTINAL ENDOSCOPY  2022         Current Outpatient Medications:   •  aspirin 81 MG EC tablet, Take 1 tablet by mouth Daily., Disp: , Rfl:   •  buPROPion XL (WELLBUTRIN XL) 300 MG 24 hr tablet, Take 1 tablet by mouth Daily., Disp: , Rfl:   •  busPIRone (BUSPAR) 5 MG tablet, Take 1 tablet by mouth 2 (Two) Times a Day., Disp: , Rfl:   •  clopidogrel (PLAVIX) 75 MG tablet, TAKE ONE TABLET BY MOUTH DAILY, Disp: 90 tablet, Rfl: 2  •  dicyclomine (BENTYL) 10 MG capsule, Take 1 capsule by mouth 4 (Four) Times a Day As Needed., Disp: , Rfl:   •  fluconazole (DIFLUCAN) 150 MG tablet, Take 1 tablet for symptoms, can take second tablet 72 hours later if symptoms persist., Disp: 2 tablet, Rfl: 0  •  irbesartan (AVAPRO) 300 MG tablet, Take 1 tablet by mouth Every Night. (Patient taking differently: Take 1 tablet by mouth Daily.), Disp: 90 tablet, Rfl: 3  •  ketorolac (TORADOL) 10 MG tablet, Take 1 tablet by mouth Every 6 (Six) Hours As Needed for Moderate Pain., Disp: 20 tablet, Rfl: 0  •  Lidocaine Viscous HCl (XYLOCAINE) 2 % solution, use 5 ml EVERY 2 hours AS NEEDED, Disp: , Rfl:   •  meclizine (ANTIVERT) 25 MG tablet, Take 1 tablet by mouth 3 (Three) Times a Day As Needed for Dizziness., Disp: 30 tablet, Rfl: 0  •  nitroglycerin (NITROSTAT) 0.4 MG SL tablet, 1 under the tongue as needed for angina, may repeat q5mins for up three doses, Disp: 100  "tablet, Rfl: 11  •  pantoprazole (PROTONIX) 40 MG EC tablet, Take 1 tablet by mouth Daily., Disp: 90 tablet, Rfl: 3  •  psyllium (Metamucil) 0.52 g capsule, Take 1 capsule by mouth Daily., Disp: 30 capsule, Rfl: 5  •  rosuvastatin (CRESTOR) 40 MG tablet, TAKE ONE TABLET BY MOUTH EVERY DAY, Disp: 90 tablet, Rfl: 3  •  simethicone (MYLICON,GAS-X) 125 MG capsule capsule, 1 capsule., Disp: , Rfl:   •  spironolactone (ALDACTONE) 25 MG tablet, Take 1 tablet by mouth Daily., Disp: 90 tablet, Rfl: 3  •  carvedilol (COREG) 6.25 MG tablet, Take 1 tablet by mouth 2 (Two) Times a Day With Meals for 30 days., Disp: 180 tablet, Rfl: 3     Allergies   Allergen Reactions   • Eggs Or Egg-Derived Products Anaphylaxis   • Erythromycin Unknown - High Severity       Family History   Problem Relation Age of Onset   • Coronary artery disease Mother    • Coronary artery disease Father    • Malig Hyperthermia Neg Hx         Social History     Social History Narrative   • Not on file       Objective       Review of Systems   Constitutional: Negative for chills, fever, unexpected weight gain and unexpected weight loss.   HENT: Negative for trouble swallowing.    Respiratory: Negative for cough, choking, chest tightness, shortness of breath, wheezing and stridor.    Cardiovascular: Negative for chest pain, palpitations and leg swelling.   Gastrointestinal: Positive for abdominal distention, abdominal pain, constipation and diarrhea. Negative for anal bleeding, blood in stool, nausea, rectal pain, vomiting, GERD and indigestion.        Vital Signs:   /71 (BP Location: Right arm, Patient Position: Sitting, Cuff Size: Adult)   Pulse 87   Ht 165.1 cm (65\")   Wt 97.2 kg (214 lb 3.2 oz)   BMI 35.64 kg/m²       Physical Exam  Constitutional:       General: She is not in acute distress.     Appearance: She is well-developed. She is not ill-appearing.   HENT:      Head: Normocephalic.   Eyes:      Pupils: Pupils are equal, round, and reactive " to light.   Cardiovascular:      Rate and Rhythm: Normal rate and regular rhythm.      Heart sounds: Normal heart sounds.   Pulmonary:      Effort: Pulmonary effort is normal.      Breath sounds: Normal breath sounds.   Abdominal:      General: Bowel sounds are normal. There is distension.      Palpations: Abdomen is soft. There is no mass.      Tenderness: There is no abdominal tenderness. There is no guarding or rebound.      Hernia: No hernia is present.   Musculoskeletal:         General: Normal range of motion.   Skin:     General: Skin is warm and dry.   Neurological:      Mental Status: She is alert and oriented to person, place, and time.   Psychiatric:         Speech: Speech normal.         Behavior: Behavior normal.         Judgment: Judgment normal.           Assessment & Plan          Assessment and Plan    Diagnoses and all orders for this visit:    1. Guzmán's esophagus without dysplasia (Primary)  -     Case Request; Standing  -     Case Request    2. Gastroesophageal reflux disease without esophagitis  -     pantoprazole (PROTONIX) 40 MG EC tablet; Take 1 tablet by mouth Daily.  Dispense: 90 tablet; Refill: 3  -     Case Request; Standing  -     Case Request    3. Irritable bowel syndrome with both constipation and diarrhea    4. Elevated liver function tests  -     Comprehensive Metabolic Panel    Other orders  -     Follow Anesthesia Guidelines / Protocol; Future  -     Obtain Informed Consent; Future  -     Obtain Informed Consent; Standing  -     Verify NPO; Standing    Reviewed EGD and colonoscopy results with her today.  GERD seems pretty well controlled on Protonix 40 mg daily.  Continue GERD precautions.  She is due for surveillance EGD for Guzmán's esophagus.  Will get her on the schedule for that.  IBS-mixed is still not well controlled.  Linzess was too much and she had to stop taking it.  Recommend she start daily Metamucil.  Recommend a high-fiber diet.  Handout given today.  Not sure  what caused her spike in liver enzymes.  Will repeat labs today.  Hopefully they have come back down since she finished the antibiotics.  Patient to follow-up with PCP as planned.  Next screening colonoscopy due in 10 years.  Patient to call the office in 2 weeks with an update.  Patient to follow-up with me in 3 months.  Patient is agreeable to the plan.      The risk of the endoscopy were discussed in detail. Possible risks/complications, benefits, and alternatives to surgical or invasive procedure have been explained to patient and/or legal guardian; risks include bleeding, infection, and perforation. Patient has been evaluated and can tolerate anesthesia and/or sedation.             Follow Up       Follow Up   Return in about 3 months (around 7/25/2023) for BARRETTS.  Patient was given instructions and counseling regarding her condition or for health maintenance advice. Please see specific information pulled into the AVS if appropriate.

## 2023-04-25 NOTE — PATIENT INSTRUCTIONS
Start metamucil daily x 2 weeks    Continue protonix 40 mg daily    Continue high fiber diet

## 2023-04-26 ENCOUNTER — TELEPHONE (OUTPATIENT)
Dept: GASTROENTEROLOGY | Facility: CLINIC | Age: 63
End: 2023-04-26
Payer: COMMERCIAL

## 2023-04-26 NOTE — TELEPHONE ENCOUNTER
Pt called and left a vm requesting results of labs. Labs resulted but not yet reviewed by provider.

## 2023-04-26 NOTE — TELEPHONE ENCOUNTER
Procedure: Colonoscopy and/or EGD    Medication Directive: Plavix 5 days    PMH: CAD, HLD, HTN    Last Seen: 12/05/22    ECHO: 09/15/21  • The left ventricular cavity is borderline dilated.  • Estimated left ventricular EF was in agreement with the calculated left ventricular EF. Left ventricular ejection fraction appears to be 61 - 65%. Left ventricular systolic function is normal.  • Left ventricular diastolic function is consistent with (grade I) impaired relaxation.  • Estimated right ventricular systolic pressure from tricuspid regurgitation is normal (<35 mmHg).

## 2023-04-26 NOTE — ED NOTES
Initial EKG performed by Trauma tech Fernanda Snider and shown to Dr. Herrera. EKG order would not cross over according to trauma nurse and it was not charted. Dr. Herrera discontinued the STEMI using this EKG, but it was not found on chart.      Zoran Bonds, RN  08/11/21 1927     O-T Advancement Flap Text: Because of the full-thickness nature of the defect and location adjacent to important structure(s), a bilateral advancement flap (A to T) was planned. After prep and anesthesia, a Burow's triangle was excised adjacent to the defect.  Incisions were extended from the opposite side of the wound, and smaller Burow?s triangles at the end of each incision.  Two flaps were created by undermining in the subcutaneous plane. After hemostasis was obtained, the flaps were advanced and sutured in a layered fashion.

## 2023-04-27 NOTE — TELEPHONE ENCOUNTER
Patient was notified of her lab results. Patient requested to know if there was any concern for her kidney function. Please advise

## 2023-04-27 NOTE — PROGRESS NOTES
Please call the patient and let her know her liver enzymes are back to normal.  Labs look good.  Thanks

## 2023-04-28 NOTE — TELEPHONE ENCOUNTER
Ms. Rodriguezjuan may proceed with upcoming colonoscopy and/or EGD at moderate risk for perioperative cardiac events.  She may hold Plavix for 5 days prior to procedure if needed.

## 2023-05-11 DIAGNOSIS — K21.9 GASTROESOPHAGEAL REFLUX DISEASE WITHOUT ESOPHAGITIS: ICD-10-CM

## 2023-05-11 RX ORDER — PANTOPRAZOLE SODIUM 40 MG/1
40 TABLET, DELAYED RELEASE ORAL DAILY
Qty: 90 TABLET | Refills: 3 | Status: SHIPPED | OUTPATIENT
Start: 2023-05-11

## 2023-05-11 NOTE — TELEPHONE ENCOUNTER
Patient is requesting pantoprazole #90. Order pended.    Last ov: 4/25/23  Next ov: 7/25/23  Last refill: 4/25/23

## 2023-06-12 ENCOUNTER — TELEPHONE (OUTPATIENT)
Dept: GASTROENTEROLOGY | Facility: CLINIC | Age: 63
End: 2023-06-12
Payer: COMMERCIAL

## 2023-06-12 NOTE — TELEPHONE ENCOUNTER
Patient aware to suspend Plavix 5 days before procedure, pt to suspend Plavix starting 6/18/2023.

## 2023-07-21 NOTE — TELEPHONE ENCOUNTER
Pt is requesting sucralfate. Order pended.   Last ov: 6/23/23  Next ov: 7/25/23  Last refill: 6/23/23

## 2023-07-24 RX ORDER — SUCRALFATE 1 G/1
TABLET ORAL
Qty: 120 TABLET | Refills: 0 | OUTPATIENT
Start: 2023-07-24

## 2023-07-25 ENCOUNTER — OFFICE VISIT (OUTPATIENT)
Dept: GASTROENTEROLOGY | Facility: CLINIC | Age: 63
End: 2023-07-25
Payer: COMMERCIAL

## 2023-07-25 VITALS
HEIGHT: 65 IN | WEIGHT: 218 LBS | BODY MASS INDEX: 36.32 KG/M2 | SYSTOLIC BLOOD PRESSURE: 134 MMHG | HEART RATE: 80 BPM | DIASTOLIC BLOOD PRESSURE: 68 MMHG

## 2023-07-25 DIAGNOSIS — Z87.19 HISTORY OF BARRETT'S ESOPHAGUS: Primary | ICD-10-CM

## 2023-07-25 DIAGNOSIS — Z87.19 HISTORY OF GASTRITIS: ICD-10-CM

## 2023-07-25 DIAGNOSIS — K58.2 IRRITABLE BOWEL SYNDROME WITH BOTH CONSTIPATION AND DIARRHEA: ICD-10-CM

## 2023-07-25 DIAGNOSIS — K21.9 GASTROESOPHAGEAL REFLUX DISEASE WITHOUT ESOPHAGITIS: ICD-10-CM

## 2023-07-25 PROCEDURE — 99214 OFFICE O/P EST MOD 30 MIN: CPT | Performed by: NURSE PRACTITIONER

## 2023-07-25 RX ORDER — PANTOPRAZOLE SODIUM 20 MG/1
20 TABLET, DELAYED RELEASE ORAL DAILY
Qty: 90 TABLET | Refills: 3 | Status: SHIPPED | OUTPATIENT
Start: 2023-07-25

## 2023-07-25 NOTE — PROGRESS NOTES
Chief Complaint   Guzmán's Esophagus     History of Present Illness       Carley Deng is a 62 y.o. female who presents to [facility] for follow-up for GERD.  She was last seen in the office by me on 4/25/2023.    Alternates between constipation and diarrhea. Tried taking Linzess but it caused diarrhea so she quit taking it. Has been taking metamucil but admits she doesn't take it faithfully. Will take bentyl PRN. Hasn't needed it lately.  She admits her IBS is worse when stress is worse.  She leads a very stressful job.      Has a history of GERD/gastritis/Guzmán's esophagus without dysplasia---has been doing better with protonix 40 mg daily. Denies any dysphagia, abd pain, N&V, rectal bleeding or melena. Appetite is good. Weight is up 8 lbs since December.      Her last EGD and colonoscopy done by Dr. Paniagua was on 8/17/2022.  recall colonoscopy in 10 years.  Surveillance EGD in 1 year.     Has a history of MI/CAD with cardiac stents and is on Plavix.     Denies any significant GI family history--     Hx elevated liver enzymes---newly dx this past March after taking multiple ABX      Has history of cholecystectomy       Repeat LFTs are back to normal. She is reporting better Bms now. Taking metamucil OTC. Reflux is well controlled on protonix 40 mg daily. Denies any breakthrough reflux.  She does admit she has been struggling with her diet.  She is been trying to lose weight.  She would like to see a nutritionist for better advice on how she should be eating and how much and how often.  She does feel like her bowels are doing better when she does more of a high-fiber diet.  She does better when she sticks to her Metamucil.    She underwent EGD with Dr. Paniagua on 6/23/2023.  EGD showed irregular Z-line with a few gastric polyps.  Path positive for gastritis.  Stomach polyps benign.  No evidence of Guzmán's.  Results       Result Review :       [facility]          3/20/2023     07:17 4/25/2023    09:03 7/6/2023    07:11   CMP   Glucose 129  145  126    BUN 16  19  17    Creatinine 1.05  1.38  1.05    EGFR 59.5  42.8  59.5    Sodium 142  142  140    Potassium 4.3  4.4  4.6    Chloride 108  109  108    Calcium 8.9  10.0  9.5    Total Protein 6.7  7.1  7.1    Albumin 4.0  4.4  4.2    Globulin 2.7  2.7  2.9    Total Bilirubin 0.6  0.8  0.6    Alkaline Phosphatase 145  94  76    AST (SGOT) 70  14  20    ALT (SGPT) 220  18  22    Albumin/Globulin Ratio 1.5  1.6  1.4    BUN/Creatinine Ratio 15.2  13.8  16.2    Anion Gap 8.0  9.0  10.0      CBC          3/10/2023    09:34   CBC   WBC 8.43    RBC 4.45    Hemoglobin 14.1    Hematocrit 40.5    MCV 91.0    MCH 31.7    MCHC 34.8    RDW 12.7    Platelets 237      Lipid Panel          12/1/2022    07:07 3/20/2023    07:17 7/6/2023    07:11   Lipid Panel   Total Cholesterol 153  148  139    Triglycerides 126  110  117    HDL Cholesterol 69  74  58    VLDL Cholesterol 22  19  21    LDL Cholesterol  62  55  60    LDL/HDL Ratio 0.85  0.70  0.99                      Past Medical History       Past Medical History:   Diagnosis Date    Abdominal bloating 04/15/2022    Added automatically from request for surgery [id number]    Anxiety     Guzmán esophagus 2022    Benign essential tremor     CHF (congestive heart failure)     Cholelithiasis 1984    Had Gallbladder Removed    Coronary artery disease 2021    Heart Attack - Aug. 2022    Depression     Diarrhea 04/15/2022    Added automatically from request for surgery [id number]    Diverticulitis of colon 2018?    diverticuLOSIS    Epigastric pain 04/15/2022    Added automatically from request for surgery [id number]    GERD (gastroesophageal reflux disease) 2021    Hyperlipemia     Hypertension     Irritable bowel syndrome 1990    Lactose intolerance 2022    Limb swelling     Lumbago     Myocardial infarction     8/2021    Seasonal allergies        Past Surgical History:   Procedure Laterality Date    ABDOMINAL SURGERY  ?!     Gallbladder    CARDIAC CATHETERIZATION Left 2021    Procedure: Left Heart Cath with coronary angiography and possible right heart cath. Possible PTCA/stent Insertion.;  Surgeon: Dc Mckeon MD;  Location: [facility] CATH INVASIVE LOCATION;  Service: Cardiovascular;  Laterality: Left;     SECTION      CHOLECYSTECTOMY      COLONOSCOPY      COLONOSCOPY N/A 2022    Procedure: COLONOSCOPY WITH BIOPSIES;  Surgeon: Willow Paniagua MD;  Location: [facility] ENDOSCOPY;  Service: Gastroenterology;  Laterality: N/A;  HEMORRHOIDS  DIVERTICULOSIS    CORONARY STENT PLACEMENT      ENDOSCOPY      ENDOSCOPY N/A 2022    Procedure: ESOPHAGOGASTRODUODENOSCOPY WITH BIOPSIES;  Surgeon: Willow Paniagua MD;  Location: [facility] ENDOSCOPY;  Service: Gastroenterology;  Laterality: N/A;  GASTRITIS    ENDOSCOPY N/A 2023    Procedure: ESOPHAGOGASTRODUODENOSCOPY with biopsy;  Surgeon: Willow Paniagua MD;  Location: [facility] ENDOSCOPY;  Service: Gastroenterology;  Laterality: N/A;  gastric polyps    HYSTERECTOMY      TUBAL ABDOMINAL LIGATION      UPPER GASTROINTESTINAL ENDOSCOPY           Current Outpatient Medications:     aspirin 81 MG EC tablet, Take 1 tablet by mouth Daily., Disp: , Rfl:     buPROPion XL (WELLBUTRIN XL) 300 MG 24 hr tablet, Take 1 tablet by mouth Daily., Disp: , Rfl:     busPIRone (BUSPAR) 5 MG tablet, Take 1 tablet by mouth 2 (Two) Times a Day., Disp: , Rfl:     clopidogrel (PLAVIX) 75 MG tablet, TAKE ONE TABLET BY MOUTH DAILY, Disp: 90 tablet, Rfl: 2    cyclobenzaprine (FLEXERIL) 10 MG tablet, Take 1 tablet by mouth 3 (Three) Times a Day As Needed for Muscle Spasms., Disp: 30 tablet, Rfl: 0    dicyclomine (BENTYL) 10 MG capsule, Take 1 capsule by mouth 4 (Four) Times a Day As Needed., Disp: , Rfl:     gabapentin (NEURONTIN) 300 MG capsule, Take 1 capsule by mouth 3 (Three) Times a Day., Disp: , Rfl:     irbesartan (AVAPRO) 300 MG tablet, Take 1 tablet by  mouth Every Night. (Patient taking differently: Take 1 tablet by mouth Daily.), Disp: 90 tablet, Rfl: 3    meclizine (ANTIVERT) 25 MG tablet, Take 1 tablet by mouth 3 (Three) Times a Day As Needed for Dizziness., Disp: 30 tablet, Rfl: 0    nitroglycerin (NITROSTAT) 0.4 MG SL tablet, 1 under the tongue as needed for angina, may repeat q5mins for up three doses, Disp: 100 tablet, Rfl: 11    pantoprazole (PROTONIX) 20 MG EC tablet, Take 1 tablet by mouth Daily., Disp: 90 tablet, Rfl: 3    psyllium (Metamucil) 0.52 g capsule, Take 1 capsule by mouth Daily., Disp: 30 capsule, Rfl: 5    rosuvastatin (CRESTOR) 40 MG tablet, TAKE ONE TABLET BY MOUTH EVERY DAY, Disp: 90 tablet, Rfl: 3    simethicone (MYLICON,GAS-X) 125 MG capsule capsule, 1 capsule., Disp: , Rfl:     spironolactone (ALDACTONE) 25 MG tablet, Take 1 tablet by mouth Daily., Disp: 90 tablet, Rfl: 3    carvedilol (COREG) 6.25 MG tablet, Take 1 tablet by mouth 2 (Two) Times a Day With Meals for 30 days., Disp: 180 tablet, Rfl: 3     Allergies   Allergen Reactions    Eggs Or Egg-Derived Products Anaphylaxis    Erythromycin Unknown - High Severity       Family History   Problem Relation Age of Onset    Coronary artery disease Mother     Coronary artery disease Father     Malig Hyperthermia Neg Hx         Social History     Social History Narrative    Not on file       Objective       Review of Systems   Constitutional:  Negative for appetite change, fatigue, fever, unexpected weight gain and unexpected weight loss.   HENT:  Negative for trouble swallowing.    Respiratory:  Negative for cough, choking, chest tightness, shortness of breath, wheezing and stridor.    Cardiovascular:  Negative for chest pain, palpitations and leg swelling.   Gastrointestinal:  Positive for abdominal distention, constipation and diarrhea. Negative for abdominal pain, anal bleeding, blood in stool, nausea, rectal pain, vomiting, GERD and indigestion.      Vital Signs:   /68 (BP  "Location: Right arm, Patient Position: Sitting, Cuff Size: Adult)   Pulse 80   Ht 165.1 cm (65\")   Wt 98.9 kg (218 lb)   BMI 36.28 kg/m²       Physical Exam  Constitutional:       General: She is not in acute distress.     Appearance: She is well-developed. She is not ill-appearing.   HENT:      Head: Normocephalic.   Eyes:      Pupils: Pupils are equal, round, and reactive to light.   Cardiovascular:      Rate and Rhythm: Normal rate and regular rhythm.      Heart sounds: Normal heart sounds.   Pulmonary:      Effort: Pulmonary effort is normal.      Breath sounds: Normal breath sounds.   Abdominal:      General: Bowel sounds are normal. There is no distension.      Palpations: Abdomen is soft. There is no mass.      Tenderness: There is no abdominal tenderness. There is no guarding or rebound.      Hernia: No hernia is present.   Musculoskeletal:         General: Normal range of motion.   Skin:     General: Skin is warm and dry.   Neurological:      Mental Status: She is alert and oriented to person, place, and time.   Psychiatric:         Speech: Speech normal.         Behavior: Behavior normal.         Judgment: Judgment normal.         Assessment & Plan          Assessment and Plan    Diagnoses and all orders for this visit:    1. History of Guzmán's esophagus (Primary)  -     Ambulatory Referral to Nutrition Services    2. Gastroesophageal reflux disease without esophagitis  -     pantoprazole (PROTONIX) 20 MG EC tablet; Take 1 tablet by mouth Daily.  Dispense: 90 tablet; Refill: 3  -     Ambulatory Referral to Nutrition Services    3. Irritable bowel syndrome with both constipation and diarrhea  -     Ambulatory Referral to Nutrition Services    4. History of gastritis    Reviewed EGD results with her today.  No evidence of Guzmán's esophagus found.  GERD seems well controlled on Protonix 40 mg daily.  Since she is doing better and there was no evidence of Guzmán's this time okay to decrease Protonix " to 20 mg daily and see how she does.  Continue GERD precautions.  IBS-mixed is definitely better on daily Metamucil.  Continue high-fiber diet.  Will refer to nutrition services to help with dietary guidance.  Overall she seems to be doing well.  Patient to call the office with any issues.  Patient to follow-up with me in 6 months.  Patient is agreeable to the plan.            Follow Up       Follow Up   Return in about 6 months (around 1/25/2024) for BARRETTS, IBS.  Patient was given instructions and counseling regarding her condition or for health maintenance advice. Please see specific information pulled into the AVS if appropriate.

## 2023-08-31 DIAGNOSIS — I10 ESSENTIAL HYPERTENSION: ICD-10-CM

## 2023-08-31 DIAGNOSIS — I25.10 CORONARY ARTERY DISEASE INVOLVING NATIVE CORONARY ARTERY OF NATIVE HEART WITHOUT ANGINA PECTORIS: ICD-10-CM

## 2023-08-31 RX ORDER — IRBESARTAN 300 MG/1
300 TABLET ORAL NIGHTLY
Qty: 90 TABLET | Refills: 3 | Status: SHIPPED | OUTPATIENT
Start: 2023-08-31

## 2023-09-28 RX ORDER — CLOPIDOGREL BISULFATE 75 MG/1
TABLET ORAL
Qty: 90 TABLET | Refills: 3 | Status: SHIPPED | OUTPATIENT
Start: 2023-09-28

## 2023-11-06 ENCOUNTER — TELEPHONE (OUTPATIENT)
Dept: CARDIOLOGY | Facility: CLINIC | Age: 63
End: 2023-11-06
Payer: COMMERCIAL

## 2023-11-06 NOTE — TELEPHONE ENCOUNTER
I would not have objections to limited fasting (such as fasting during daytime hours, or limited timeframe such as a 24-hour fast), but  would not advise prolonged fasting.

## 2023-11-06 NOTE — TELEPHONE ENCOUNTER
Patient called asking if there is a medical reason she could not participate in a [employer] fasting. She would continue to take medications during her fast.     Patient wanted to go over her medication list as well and noted PCP Dr Davalos has discontinued several medications that were still on list. RN updated medication list appropriately.

## 2024-01-25 ENCOUNTER — TELEPHONE (OUTPATIENT)
Dept: GASTROENTEROLOGY | Facility: CLINIC | Age: 64
End: 2024-01-25

## 2024-01-25 NOTE — TELEPHONE ENCOUNTER
"Patient sent a message via [employer] on 01/24/24 at 11:01 pm and stated \"I need to cancel this appointment. I do apologize.\" I contacted the patient and verified her information and advised that I was calling due to the message she sent in, patient stated that she needed to call back since she wasn't by her calendar so I advised that I would go ahead and cancel her appointment since she send the message in prior to her appointment and to call the office back at her earliest convenience to get rescheduled.   "

## 2024-01-30 ENCOUNTER — TRANSCRIBE ORDERS (OUTPATIENT)
Dept: ADMINISTRATIVE | Facility: HOSPITAL | Age: 64
End: 2024-01-30
Payer: MEDICARE

## 2024-01-30 DIAGNOSIS — Z12.31 VISIT FOR SCREENING MAMMOGRAM: Primary | ICD-10-CM

## 2024-02-27 ENCOUNTER — OFFICE VISIT (OUTPATIENT)
Dept: CARDIOLOGY | Facility: CLINIC | Age: 64
End: 2024-02-27
Payer: MEDICARE

## 2024-02-27 VITALS
BODY MASS INDEX: 29.32 KG/M2 | SYSTOLIC BLOOD PRESSURE: 133 MMHG | WEIGHT: 176 LBS | DIASTOLIC BLOOD PRESSURE: 72 MMHG | HEART RATE: 60 BPM | HEIGHT: 65 IN

## 2024-02-27 DIAGNOSIS — I10 ESSENTIAL HYPERTENSION: ICD-10-CM

## 2024-02-27 DIAGNOSIS — E78.5 HYPERLIPIDEMIA LDL GOAL <70: ICD-10-CM

## 2024-02-27 DIAGNOSIS — I25.10 CORONARY ARTERY DISEASE INVOLVING NATIVE CORONARY ARTERY OF NATIVE HEART WITHOUT ANGINA PECTORIS: Primary | ICD-10-CM

## 2024-02-27 PROCEDURE — 99214 OFFICE O/P EST MOD 30 MIN: CPT | Performed by: INTERNAL MEDICINE

## 2024-02-27 PROCEDURE — 3078F DIAST BP <80 MM HG: CPT | Performed by: INTERNAL MEDICINE

## 2024-02-27 PROCEDURE — 1160F RVW MEDS BY RX/DR IN RCRD: CPT | Performed by: INTERNAL MEDICINE

## 2024-02-27 PROCEDURE — 1159F MED LIST DOCD IN RCRD: CPT | Performed by: INTERNAL MEDICINE

## 2024-02-27 PROCEDURE — 3075F SYST BP GE 130 - 139MM HG: CPT | Performed by: INTERNAL MEDICINE

## 2024-02-27 RX ORDER — SPIRONOLACTONE 25 MG/1
25 TABLET ORAL DAILY
Qty: 90 TABLET | Refills: 3 | Status: SHIPPED | OUTPATIENT
Start: 2024-02-27

## 2024-02-27 RX ORDER — CARVEDILOL 6.25 MG/1
6.25 TABLET ORAL 2 TIMES DAILY WITH MEALS
Qty: 180 TABLET | Refills: 3 | Status: SHIPPED | OUTPATIENT
Start: 2024-02-27

## 2024-02-27 RX ORDER — ROSUVASTATIN CALCIUM 40 MG/1
40 TABLET, COATED ORAL DAILY
Qty: 90 TABLET | Refills: 3 | Status: SHIPPED | OUTPATIENT
Start: 2024-02-27

## 2024-02-27 RX ORDER — IRBESARTAN 300 MG/1
1 TABLET ORAL DAILY
COMMUNITY
Start: 2024-01-30 | End: 2024-04-29

## 2024-02-27 NOTE — ASSESSMENT & PLAN NOTE
Most recent LDL is 84, which is above goal.  It was well-controlled for the past several years on rosuvastatin 40 mg daily.  Counseled regarding dietary changes and regular exercise.  We will continue the same medication.  Will repeat lipid panel before next visit.

## 2024-02-27 NOTE — ASSESSMENT & PLAN NOTE
She is currently stable with no angina.  With systolic function is preserved.  Recommend to continue aspirin, Plavix, statin and beta-blocker.  Plavix may be discontinued later this year, after completing 3 years of dual antiplatelet therapy.

## 2024-02-27 NOTE — PROGRESS NOTES
CARDIOLOGY FOLLOW-UP PROGRESS NOTE        Chief Complaint  Follow-up and Coronary Artery Disease    Subjective            Carley Deng presents to [facility] CARDIOLOGY  History of Present Illness    Ms Deng is here for routine 6-month follow-up visit.  She denies any recent episodes of chest pain, shortness of breath, palpitations or dizziness.  She is taking all the medications as prescribed.  She intentionally lost around 40 pounds during the past 6 months.      Past History:    Coronary artery disease : Index presentation is non-STEMI on 2021, s/p angioplasty and stent placement to mid LAD artery.  RCA had a 40 to 50% proximal long stenosis.     Essential hypertension  Mixed hyperlipidemia  Irritable bowel syndrome    Medical History:  Past Medical History:   Diagnosis Date    Abdominal bloating 04/15/2022    Added automatically from request for surgery [id number]    Anxiety     Guzmán esophagus     Benign essential tremor     CHF (congestive heart failure)     Cholelithiasis     Had Gallbladder Removed    Coronary artery disease     Heart Attack - Aug. 2022    Depression     Diarrhea 04/15/2022    Added automatically from request for surgery [id number]    Diverticulitis of colon ?    diverticuLOSIS    Epigastric pain 04/15/2022    Added automatically from request for surgery [id number]    GERD (gastroesophageal reflux disease)     Hyperlipemia     Hypertension     Irritable bowel syndrome     Lactose intolerance     Limb swelling     Lumbago     Myocardial infarction     2021    Seasonal allergies        Surgical History: has a past surgical history that includes Cholecystectomy;  section; Cardiac catheterization (Left, 2021); Colonoscopy (); Esophagogastroduodenoscopy (); Hysterectomy (); Coronary stent placement; Esophagogastroduodenoscopy (N/A, 2022); Colonoscopy (N/A, 2022); Abdominal surgery (?!); Tubal ligation  "(1984); Upper gastrointestinal endoscopy (2022); and Esophagogastroduodenoscopy (N/A, 6/23/2023).     Family History: family history includes Coronary artery disease in her father and mother.     Social History: reports that she has never smoked. She has never been exposed to tobacco smoke. She has never used smokeless tobacco. She reports that she does not drink alcohol and does not use drugs.    Allergies: Eggs or egg-derived products and Erythromycin    Current Outpatient Medications on File Prior to Visit   Medication Sig    irbesartan (AVAPRO) 300 MG tablet Take 1 tablet by mouth Daily.    aspirin 81 MG EC tablet Take 1 tablet by mouth Daily.    Cholecalciferol 25 MCG (1000 UT) tablet Take 1 tablet by mouth Daily.    clopidogrel (PLAVIX) 75 MG tablet TAKE ONE TABLET BY MOUTH EVERY DAY    nitroglycerin (NITROSTAT) 0.4 MG SL tablet 1 under the tongue as needed for angina, may repeat q5mins for up three doses    simethicone (MYLICON,GAS-X) 125 MG capsule capsule 1 capsule.    carvedilol (COREG) 6.25 MG tablet Take 1 tablet by mouth 2 (Two) Times a Day With Meals for 30 days.    rosuvastatin (CRESTOR) 40 MG tablet TAKE ONE TABLET BY MOUTH EVERY DAY    spironolactone (ALDACTONE) 25 MG tablet Take 1 tablet by mouth Daily.         Review of Systems   Respiratory:  Negative for cough, shortness of breath and wheezing.    Cardiovascular:  Negative for chest pain, palpitations and leg swelling.   Gastrointestinal:  Negative for nausea and vomiting.   Neurological:  Negative for dizziness and syncope.        Objective     /72   Pulse 60   Ht 165.1 cm (65\")   Wt 79.8 kg (176 lb)   BMI 29.29 kg/m²       Physical Exam    General : Alert, awake, no acute distress  Neck : Supple, no carotid bruit, no jugular venous distention  CVS : Regular rate and rhythm, no murmur, rubs or gallops  Lungs: Clear to auscultation bilaterally, no crackles or rhonchi  Abdomen: Soft, nontender, bowel sounds heard in all 4 " quadrants  Extremities: Warm, well-perfused, no pedal edema    Result Review :     The following data was reviewed by: David Cedeño MD on 02/27/2024:                          Data reviewed: Cardiology studies        Results for orders placed in visit on 09/15/21    Adult Transthoracic Echo Complete W/ Cont if Necessary Per Protocol    Interpretation Summary  · The left ventricular cavity is borderline dilated.  · Estimated left ventricular EF was in agreement with the calculated left ventricular EF. Left ventricular ejection fraction appears to be 61 - 65%. Left ventricular systolic function is normal.  · Left ventricular diastolic function is consistent with (grade I) impaired relaxation.  · Estimated right ventricular systolic pressure from tricuspid regurgitation is normal (<35 mmHg).      Results for orders placed in visit on 09/15/21    Treadmill Stress Test    Interpretation Summary  · The patient reported shortness of breath during the stress test.  · Limited exercise capacity. No electrocardiographic evidence of exercise-induced ischemia               Assessment and Plan        Diagnoses and all orders for this visit:    1. Coronary artery disease involving native coronary artery of native heart without angina pectoris (Primary)  Assessment & Plan:  She is currently stable with no angina.  With systolic function is preserved.  Recommend to continue aspirin, Plavix, statin and beta-blocker.  Plavix may be discontinued later this year, after completing 3 years of dual antiplatelet therapy.    Orders:  -     carvedilol (COREG) 6.25 MG tablet; Take 1 tablet by mouth 2 (Two) Times a Day With Meals.  Dispense: 180 tablet; Refill: 3    2. Essential hypertension  Assessment & Plan:  Well-controlled.  Continue current regimen.     Orders:  -     carvedilol (COREG) 6.25 MG tablet; Take 1 tablet by mouth 2 (Two) Times a Day With Meals.  Dispense: 180 tablet; Refill: 3  -     spironolactone (ALDACTONE) 25 MG  tablet; Take 1 tablet by mouth Daily.  Dispense: 90 tablet; Refill: 3    3. Hyperlipidemia LDL goal <70  Assessment & Plan:  Most recent LDL is 84, which is above goal.  It was well-controlled for the past several years on rosuvastatin 40 mg daily.  Counseled regarding dietary changes and regular exercise.  We will continue the same medication.  Will repeat lipid panel before next visit.    Orders:  -     rosuvastatin (CRESTOR) 40 MG tablet; Take 1 tablet by mouth Daily.  Dispense: 90 tablet; Refill: 3              Follow Up     Return in about 6 months (around 8/27/2024) for Next scheduled follow up, 6 - 7 months . OK to double book if needed .    Patient was given instructions and counseling regarding her condition or for health maintenance advice. Please see specific information pulled into the AVS if appropriate.

## 2024-08-02 ENCOUNTER — HOSPITAL ENCOUNTER (OUTPATIENT)
Dept: MAMMOGRAPHY | Facility: HOSPITAL | Age: 64
Discharge: HOME | End: 2024-08-02
Admitting: INTERNAL MEDICINE
Payer: MEDICARE

## 2024-08-02 DIAGNOSIS — Z12.31 VISIT FOR SCREENING MAMMOGRAM: ICD-10-CM

## 2024-08-02 PROCEDURE — 77063 BREAST TOMOSYNTHESIS BI: CPT

## 2024-08-02 PROCEDURE — 77067 SCR MAMMO BI INCL CAD: CPT

## 2024-08-11 DIAGNOSIS — I25.10 CORONARY ARTERY DISEASE INVOLVING NATIVE CORONARY ARTERY OF NATIVE HEART WITHOUT ANGINA PECTORIS: ICD-10-CM

## 2024-08-11 DIAGNOSIS — I10 ESSENTIAL HYPERTENSION: ICD-10-CM

## 2024-08-12 RX ORDER — IRBESARTAN 300 MG/1
300 TABLET ORAL
Qty: 90 TABLET | Refills: 3 | Status: SHIPPED | OUTPATIENT
Start: 2024-08-12

## 2024-08-12 NOTE — TELEPHONE ENCOUNTER
NOEMY PT.  PT STATED SHE DOES TAKE IRBESARTAN 300 MG DAILY AND HER PCP HAS BEEN REFILLING IT FOR HER BUT SHE HAS NO REFILLS.  [facility] [facility]

## 2024-09-06 PROBLEM — J30.2 SEASONAL ALLERGIC RHINITIS: Status: ACTIVE | Noted: 2024-09-06

## 2024-09-06 PROBLEM — M79.89 LIMB SWELLING: Status: ACTIVE | Noted: 2024-09-06

## 2024-09-16 RX ORDER — CLOPIDOGREL BISULFATE 75 MG/1
TABLET ORAL
Qty: 90 TABLET | Refills: 3 | Status: SHIPPED | OUTPATIENT
Start: 2024-09-16

## 2024-09-26 ENCOUNTER — OFFICE VISIT (OUTPATIENT)
Dept: CARDIOLOGY | Facility: CLINIC | Age: 64
End: 2024-09-26
Payer: MEDICARE

## 2024-09-26 VITALS
BODY MASS INDEX: 26.82 KG/M2 | WEIGHT: 161 LBS | HEART RATE: 84 BPM | DIASTOLIC BLOOD PRESSURE: 73 MMHG | HEIGHT: 65 IN | SYSTOLIC BLOOD PRESSURE: 125 MMHG

## 2024-09-26 DIAGNOSIS — I10 PRIMARY HYPERTENSION: ICD-10-CM

## 2024-09-26 DIAGNOSIS — I25.10 CORONARY ARTERY DISEASE INVOLVING NATIVE CORONARY ARTERY OF NATIVE HEART WITHOUT ANGINA PECTORIS: Primary | ICD-10-CM

## 2024-09-26 DIAGNOSIS — E78.2 MIXED HYPERLIPIDEMIA: ICD-10-CM

## 2024-09-26 PROCEDURE — 3074F SYST BP LT 130 MM HG: CPT | Performed by: INTERNAL MEDICINE

## 2024-09-26 PROCEDURE — 99214 OFFICE O/P EST MOD 30 MIN: CPT | Performed by: INTERNAL MEDICINE

## 2024-09-26 PROCEDURE — 1160F RVW MEDS BY RX/DR IN RCRD: CPT | Performed by: INTERNAL MEDICINE

## 2024-09-26 PROCEDURE — 1159F MED LIST DOCD IN RCRD: CPT | Performed by: INTERNAL MEDICINE

## 2024-09-26 PROCEDURE — 3078F DIAST BP <80 MM HG: CPT | Performed by: INTERNAL MEDICINE

## 2024-09-26 RX ORDER — CLONAZEPAM 0.5 MG/1
TABLET ORAL
COMMUNITY
Start: 2024-09-17

## 2024-12-13 ENCOUNTER — OFFICE VISIT (OUTPATIENT)
Dept: ORTHOPEDIC SURGERY | Facility: CLINIC | Age: 64
End: 2024-12-13
Payer: MEDICARE

## 2024-12-13 VITALS
HEART RATE: 84 BPM | DIASTOLIC BLOOD PRESSURE: 85 MMHG | OXYGEN SATURATION: 95 % | BODY MASS INDEX: 27.49 KG/M2 | WEIGHT: 165 LBS | HEIGHT: 65 IN | SYSTOLIC BLOOD PRESSURE: 126 MMHG

## 2024-12-13 DIAGNOSIS — S46.011A TRAUMATIC COMPLETE TEAR OF RIGHT ROTATOR CUFF, INITIAL ENCOUNTER: Primary | ICD-10-CM

## 2024-12-13 NOTE — PROGRESS NOTES
"Chief Complaint  Initial Evaluation of the Right Shoulder     Subjective      Carley Deng presents to [facility] for an evaluation  of her right shoulder. She was at her sisters house when she was reaching to to lift a child and the chair broke and landed on her right shoulder. She went to urgent care on 11/12/24 where she had x-rays done. She has pain with overhead lifting and is not able to lift her arm. She has pain at nighttime and is not able to lay on her right shoulder.     Allergies   Allergen Reactions    Egg-Derived Products Anaphylaxis    Erythromycin Unknown - High Severity        Social History     Socioeconomic History    Marital status:    Tobacco Use    Smoking status: Never     Passive exposure: Never    Smokeless tobacco: Never   Vaping Use    Vaping status: Never Used   Substance and Sexual Activity    Alcohol use: Never    Drug use: Never    Sexual activity: Yes     Partners: Male     Birth control/protection: Post-menopausal        I reviewed the patient's chief complaint, history of present illness, review of systems, past medical history, surgical history, family history, social history, medications, and allergy list.     Review of Systems     Constitutional: Denies fevers, chills, weight loss  Cardiovascular: Denies chest pain, shortness of breath  Skin: Denies rashes, acute skin changes  Neurologic: Denies headache, loss of consciousness  MSK: Right shoulder pain       Vital Signs:   /85   Pulse 84   Ht 165.1 cm (65\")   Wt 74.8 kg (165 lb)   SpO2 95%   BMI 27.46 kg/m²            Ortho Exam    Physical Exam  General:Alert. No acute distress   Right upper extremity: non tender, active forward elevation to 45 degrees, passive forward elevation  to 80 degrees, abduction to 70 degrees, external rotation  to 45 degrees, external rotation  at the side to 40 degrees, internal rotation to L5, positive  impingement, negative  cross arm, 4 minus " supraspinatus strength, 4 plus infraspinatus and subscap, neurovascularly intact, sensation intact to the medial, radial and ulnar nerve       Procedures    [facility]   Right shoulder MRI performed on 11/25/24.   Impression   1. Motion degraded limited exam.   2. Full-thickness tears of the entire supraspinatus and infraspinatus.  Subscapularis tendinosis.  3. Glenohumeral joint effusion and glenoid cartilage loss.   4. Acromioclavicular osteoarthritis with type 3  acromion. Bony spurring of the greater tuberosity of the humerus as well. Underlying bursitis. Please correlate for symptoms of  impingement.    Imaging Results (Most Recent)       None             Result Review :       No results found.           Assessment and Plan     Diagnoses and all orders for this visit:    1. Traumatic complete tear of right rotator cuff, initial encounter (Primary)        The patient presents here today for an evaluation  of her right shoulder. MRI results were discussed and reviewed with the patient today in the office.     Discussed operative treatment options regarding a right shoulder arthroscopy with rotator cuff repair, subacromial decompression versus a right total reverse shoulder arthroplasty versus non operative treatment options regarding injections, medications and therapy.     Patient wishes to proceed with operative treatment options. Risks and benefits were discussed regarding a right reverse total shoulder arthroplasty. Patient expressed understanding and wishes to proceed.      Discussed surgery., Discussed with patient the implant type being used during surgery and patient understands., Surgery pamphlet given., Call or return if worsening symptoms., DME order for a 3 in 1 given today due to patient will be confined to one room/level of the home that does not offer a toilet during postop recovery. , I am ordering the use of the Nice1 cold therapy machine for 60 days post-op as part of an opioid-sparing  approach to help manage pain and edema.  I feel this is medically necessary for the best care for this patient.   , and Patient does not have any metal allergies.     Follow Up     2 weeks post-operatively      Will obtain X-Rays of right shoulder at next visit.       Patient was given instructions and counseling regarding her condition or for health maintenance advice. Please see specific information pulled into the AVS if appropriate.     Scribed for Gaurang Downing MD by Zoya Roberson.  12/13/24   08:18 EST    I have personally performed the services described in this document as scribed by the above individual and it is both accurate and complete. Gaurang Downing MD 12/13/24

## 2024-12-16 ENCOUNTER — TELEPHONE (OUTPATIENT)
Dept: ORTHOPEDIC SURGERY | Facility: CLINIC | Age: 64
End: 2024-12-16
Payer: MEDICARE

## 2024-12-16 NOTE — TELEPHONE ENCOUNTER
Caller: Carley Deng    Relationship to patient: Self    Best call back number: [phone]    Chief complaint: RIGHT SHOULDER     Type of visit: INJECTION     Requested date: AFTER CHRISTMAS      Additional notes:THE PROVIDER INFORMED THE PATIENT SHE CAN RECEIVE AN INJECTION BEFORE SURGERY- THE INJECTION IS NOT SPECIFIED IN THE PROGRESS NOTE

## 2024-12-26 ENCOUNTER — TELEPHONE (OUTPATIENT)
Dept: CARDIOLOGY | Facility: CLINIC | Age: 64
End: 2024-12-26
Payer: MEDICARE

## 2024-12-26 ENCOUNTER — PREP FOR SURGERY (OUTPATIENT)
Dept: OTHER | Facility: HOSPITAL | Age: 64
End: 2024-12-26
Payer: MEDICARE

## 2024-12-26 ENCOUNTER — OFFICE VISIT (OUTPATIENT)
Dept: ORTHOPEDIC SURGERY | Facility: CLINIC | Age: 64
End: 2024-12-26
Payer: MEDICARE

## 2024-12-26 VITALS
OXYGEN SATURATION: 96 % | SYSTOLIC BLOOD PRESSURE: 125 MMHG | HEIGHT: 65 IN | HEART RATE: 71 BPM | WEIGHT: 165 LBS | DIASTOLIC BLOOD PRESSURE: 75 MMHG | BODY MASS INDEX: 27.49 KG/M2

## 2024-12-26 DIAGNOSIS — S46.011D TRAUMATIC COMPLETE TEAR OF RIGHT ROTATOR CUFF, SUBSEQUENT ENCOUNTER: Primary | ICD-10-CM

## 2024-12-26 DIAGNOSIS — M25.511 RIGHT SHOULDER PAIN, UNSPECIFIED CHRONICITY: ICD-10-CM

## 2024-12-26 PROBLEM — S46.011A TRAUMATIC COMPLETE TEAR OF RIGHT ROTATOR CUFF: Status: ACTIVE | Noted: 2024-12-26

## 2024-12-26 PROBLEM — M19.011 ARTHRITIS OF RIGHT SHOULDER REGION: Status: ACTIVE | Noted: 2024-12-26

## 2024-12-26 RX ORDER — BUPROPION HYDROCHLORIDE 150 MG/1
TABLET ORAL
COMMUNITY
Start: 2024-10-04

## 2024-12-26 RX ORDER — TRANEXAMIC ACID 10 MG/ML
1000 INJECTION, SOLUTION INTRAVENOUS ONCE
OUTPATIENT
Start: 2024-12-26 | End: 2024-12-26

## 2024-12-26 RX ADMIN — TRIAMCINOLONE ACETONIDE 40 MG: 40 INJECTION, SUSPENSION INTRA-ARTICULAR; INTRAMUSCULAR at 15:10

## 2024-12-26 RX ADMIN — LIDOCAINE HYDROCHLORIDE 5 ML: 10 INJECTION, SOLUTION INFILTRATION; PERINEURAL at 15:10

## 2024-12-26 NOTE — TELEPHONE ENCOUNTER
[facility] received a fax that requires your attention. The document has been indexed to the patient’s chart for your review.      Reason for sending: EXTERNAL MEDICAL RECORD NOTIFICATION     Documents Description: CARDIAC CLEARANCE REQ-NEFTALY ASIF-12.26.24    Name of Sender: [employer]    Date Indexed: 12.26.24

## 2024-12-26 NOTE — PROGRESS NOTES
"Chief Complaint  Follow-up and Pain of the Right Shoulder (Wants injection )     Subjective      Carley Deng presents to [facility] for follow up of the right shoulder.  She has had pain for a few months.  She had an injury and went to  on 11/12/24.  She was seen on 12/13/24 and decided to have a right shoulder arthroscopy.  She is here today with change of mind on surgery and wants to try conservative measures.      Allergies   Allergen Reactions    Egg-Derived Products Anaphylaxis    Erythromycin Unknown - High Severity        Social History     Socioeconomic History    Marital status:    Tobacco Use    Smoking status: Never     Passive exposure: Never    Smokeless tobacco: Never   Vaping Use    Vaping status: Never Used   Substance and Sexual Activity    Alcohol use: Never    Drug use: Never    Sexual activity: Yes     Partners: Male     Birth control/protection: Post-menopausal        I reviewed the patient's chief complaint, history of present illness, review of systems, past medical history, surgical history, family history, social history, medications, and allergy list.     Review of Systems     Constitutional: Denies fevers, chills, weight loss  Cardiovascular: Denies chest pain, shortness of breath  Skin: Denies rashes, acute skin changes  Neurologic: Denies headache, loss of consciousness        Vital Signs:   /75   Pulse 71   Ht 165.1 cm (65\")   Wt 74.8 kg (165 lb)   SpO2 96%   BMI 27.46 kg/m²            Ortho Exam    Physical Exam  General:Alert. No acute distress     Right upper extremity: non tender, active forward elevation to 90 degrees, passive forward elevation to 90 degrees, abduction to 70 degrees, external rotation to 45 degrees, external rotation at the side to 40 degrees, internal rotation to L5, positive impingement, negative cross arm, 4 minus supraspinatus strength, 4 plus infraspinatus and subscap, neurovascularly intact, sensation " intact to the medial, radial and ulnar nerve     Right shoulder  Date/Time: 12/26/2024 3:10 PM  Consent given by: patient  Site marked: site marked  Timeout: Immediately prior to procedure a time out was called to verify the correct patient, procedure, equipment, support staff and site/side marked as required   Supporting Documentation  Indications: pain   Procedure Details  Location: shoulder - Shoulder joint: right.  Needle gauge: 21 G.  Medications administered: 40 mg triamcinolone acetonide 40 MG/ML; 5 mL lidocaine 1 %  Patient tolerance: patient tolerated the procedure well with no immediate complications      This injection documentation was Scribed for Gaurang Downing MD by Sofía Pabon MA.  12/26/24   15:10 EST          Imaging Results (Most Recent)       None             Result Review :        [facility]   Right shoulder MRI performed on 11/25/24.   Impression   1. Motion degraded limited exam.   2. Full-thickness tears of the entire supraspinatus and infraspinatus.  Subscapularis tendinosis.  3. Glenohumeral joint effusion and glenoid cartilage loss.   4. Acromioclavicular osteoarthritis with type 3  acromion. Bony spurring of the greater tuberosity of the humerus as well. Underlying bursitis. Please correlate for symptoms of  impingement.             Assessment and Plan     Diagnoses and all orders for this visit:    1. Traumatic complete tear of right rotator cuff, subsequent encounter (Primary)    2. Right shoulder pain, unspecified chronicity        Discussed the treatment plan with the patient.     Discussed the risks and benefits of conservative measures. The patient expressed understanding and wished to proceed with a right shoulder steroid injection.  She tolerated the injection well.     Discussed the treatment options with the patient, operative vs non-operative. The patient expressed understanding and wished to proceed with a right reverse total shoulder.      Discussed  surgery., Risks/benefits discussed with patient including, but not limited to: infection, bleeding, neurovascular damage, re-rupture, aesthetic deformity, need for further surgery, and death., Discussed with patient the implant type being used during surgery and patient understands., Surgery pamphlet given., Call or return if worsening symptoms., DME order for a 3 in 1 given today due to patient will be confined to one room/level of the home that does not offer a toilet during postop recovery. , and Patient does not have any metal allergies.     Follow Up     2 weeks postoperatively         Patient was given instructions and counseling regarding her condition or for health maintenance advice. Please see specific information pulled into the AVS if appropriate.     Scribed for Gaurang Downing MD by Bonnie Hale MA.  12/26/24   14:59 EST    I have personally performed the services described in this document as scribed by the above individual and it is both accurate and complete. Gaurang Downing MD 12/27/24

## 2024-12-27 RX ORDER — TRIAMCINOLONE ACETONIDE 40 MG/ML
40 INJECTION, SUSPENSION INTRA-ARTICULAR; INTRAMUSCULAR
Status: COMPLETED | OUTPATIENT
Start: 2024-12-26 | End: 2024-12-26

## 2024-12-27 RX ORDER — LIDOCAINE HYDROCHLORIDE 10 MG/ML
5 INJECTION, SOLUTION INFILTRATION; PERINEURAL
Status: COMPLETED | OUTPATIENT
Start: 2024-12-26 | End: 2024-12-26

## 2024-12-27 NOTE — TELEPHONE ENCOUNTER
Procedure: Right Total Shoulder Reverse Arthroplasty    Medication Directive: NA    PMH: CAD, HTN, HLD    Last Seen: 09/26/24

## 2025-01-16 DIAGNOSIS — Z47.1 AFTERCARE FOLLOWING RIGHT SHOULDER JOINT REPLACEMENT SURGERY: Primary | ICD-10-CM

## 2025-01-16 DIAGNOSIS — Z96.611 AFTERCARE FOLLOWING RIGHT SHOULDER JOINT REPLACEMENT SURGERY: Primary | ICD-10-CM

## 2025-02-10 ENCOUNTER — PRE-ADMISSION TESTING (OUTPATIENT)
Dept: PREADMISSION TESTING | Facility: HOSPITAL | Age: 65
End: 2025-02-10
Payer: MEDICARE

## 2025-02-10 VITALS
RESPIRATION RATE: 16 BRPM | OXYGEN SATURATION: 97 % | HEART RATE: 64 BPM | TEMPERATURE: 97.9 F | SYSTOLIC BLOOD PRESSURE: 131 MMHG | HEIGHT: 64 IN | BODY MASS INDEX: 30.49 KG/M2 | DIASTOLIC BLOOD PRESSURE: 76 MMHG | WEIGHT: 178.57 LBS

## 2025-02-10 DIAGNOSIS — S46.011D TRAUMATIC COMPLETE TEAR OF RIGHT ROTATOR CUFF, SUBSEQUENT ENCOUNTER: ICD-10-CM

## 2025-02-10 LAB
ALBUMIN SERPL-MCNC: 4 G/DL (ref 3.5–5.2)
ALBUMIN/GLOB SERPL: 1.5 G/DL
ALP SERPL-CCNC: 90 U/L (ref 39–117)
ALT SERPL W P-5'-P-CCNC: 12 U/L (ref 1–33)
ANION GAP SERPL CALCULATED.3IONS-SCNC: 13 MMOL/L (ref 5–15)
AST SERPL-CCNC: 14 U/L (ref 1–32)
BASOPHILS # BLD AUTO: 0.04 10*3/MM3 (ref 0–0.2)
BASOPHILS NFR BLD AUTO: 0.7 % (ref 0–1.5)
BILIRUB SERPL-MCNC: 0.9 MG/DL (ref 0–1.2)
BUN SERPL-MCNC: 15 MG/DL (ref 8–23)
BUN/CREAT SERPL: 17.4 (ref 7–25)
CALCIUM SPEC-SCNC: 9 MG/DL (ref 8.6–10.5)
CHLORIDE SERPL-SCNC: 107 MMOL/L (ref 98–107)
CO2 SERPL-SCNC: 21 MMOL/L (ref 22–29)
CREAT SERPL-MCNC: 0.86 MG/DL (ref 0.57–1)
DEPRECATED RDW RBC AUTO: 44 FL (ref 37–54)
EGFRCR SERPLBLD CKD-EPI 2021: 74.6 ML/MIN/1.73
EOSINOPHIL # BLD AUTO: 0.11 10*3/MM3 (ref 0–0.4)
EOSINOPHIL NFR BLD AUTO: 1.8 % (ref 0.3–6.2)
ERYTHROCYTE [DISTWIDTH] IN BLOOD BY AUTOMATED COUNT: 12.7 % (ref 12.3–15.4)
GLOBULIN UR ELPH-MCNC: 2.7 GM/DL
GLUCOSE SERPL-MCNC: 124 MG/DL (ref 65–99)
HBA1C MFR BLD: 5.2 % (ref 4.8–5.6)
HCT VFR BLD AUTO: 41.6 % (ref 34–46.6)
HGB BLD-MCNC: 14.1 G/DL (ref 12–15.9)
IMM GRANULOCYTES # BLD AUTO: 0.03 10*3/MM3 (ref 0–0.05)
IMM GRANULOCYTES NFR BLD AUTO: 0.5 % (ref 0–0.5)
LYMPHOCYTES # BLD AUTO: 0.67 10*3/MM3 (ref 0.7–3.1)
LYMPHOCYTES NFR BLD AUTO: 10.9 % (ref 19.6–45.3)
MCH RBC QN AUTO: 32.2 PG (ref 26.6–33)
MCHC RBC AUTO-ENTMCNC: 33.9 G/DL (ref 31.5–35.7)
MCV RBC AUTO: 95 FL (ref 79–97)
MONOCYTES # BLD AUTO: 0.44 10*3/MM3 (ref 0.1–0.9)
MONOCYTES NFR BLD AUTO: 7.2 % (ref 5–12)
NEUTROPHILS NFR BLD AUTO: 4.86 10*3/MM3 (ref 1.7–7)
NEUTROPHILS NFR BLD AUTO: 78.9 % (ref 42.7–76)
NRBC BLD AUTO-RTO: 0 /100 WBC (ref 0–0.2)
PLATELET # BLD AUTO: 195 10*3/MM3 (ref 140–450)
PMV BLD AUTO: 9.3 FL (ref 6–12)
POTASSIUM SERPL-SCNC: 3.9 MMOL/L (ref 3.5–5.2)
PROT SERPL-MCNC: 6.7 G/DL (ref 6–8.5)
RBC # BLD AUTO: 4.38 10*6/MM3 (ref 3.77–5.28)
SODIUM SERPL-SCNC: 141 MMOL/L (ref 136–145)
WBC NRBC COR # BLD AUTO: 6.15 10*3/MM3 (ref 3.4–10.8)

## 2025-02-10 PROCEDURE — 36415 COLL VENOUS BLD VENIPUNCTURE: CPT

## 2025-02-10 PROCEDURE — 85025 COMPLETE CBC W/AUTO DIFF WBC: CPT

## 2025-02-10 PROCEDURE — 83036 HEMOGLOBIN GLYCOSYLATED A1C: CPT

## 2025-02-10 PROCEDURE — 93005 ELECTROCARDIOGRAM TRACING: CPT

## 2025-02-10 PROCEDURE — 80053 COMPREHEN METABOLIC PANEL: CPT

## 2025-02-10 RX ORDER — LORAZEPAM 0.5 MG/1
0.5 TABLET ORAL 2 TIMES DAILY PRN
COMMUNITY

## 2025-02-10 RX ORDER — VORTIOXETINE 20 MG/1
20 TABLET, FILM COATED ORAL
COMMUNITY

## 2025-02-10 NOTE — PAT
HOA AT [facility] MESSAGED AT 0959  TO VERIFY IF AND WHEN DR BAEZ WOULD WANT 81 MG ASA STOPPED. RECEIVED DIRECTIVE FROM DR BAEZ  THAT COULD STOP ASA 5-7 DAYS PRIOR TO PROCEDURE. HOA CALLED PT AND LEFT VOICEMAIL FOR PT TO CALL HER SO COULD GIVE HER MEDICATION DIRECTIVE.

## 2025-02-10 NOTE — PAT
TOTAL JOINT PACKET REVIEWED WITH PT ALONG WITH VIDEO ON COLD THERAPY USE AND SLING WITH ABDUCTION DEVICE. MADE SURE PT AWARE OF LINKS WHERE VIDEOS CAN BE REVIEWED AT HOME IF NEEDED.

## 2025-02-10 NOTE — DISCHARGE INSTRUCTIONS
IMPORTANT INSTRUCTIONS - PRE-ADMISSION TESTING  DO NOT EAT OR CHEW anything after midnight the night before your procedure.    DRINK 20 OZ GATORADE OR POWERADE NO RED 3 HOURS PRIOR TO ARRIVAL DAY OF PROCEDURE. TAKE MEDICATIONS LISTED BELOW WITH SIP OF WATER  Take the following medications the morning of your procedure with JUST A SIP OF WATER:  __  CARVEDILOL, LORAZEPAM IF NEEDED, TRINTELLIX_____________________________________________________________________________________________________________________________________________________________________________________    DO NOT BRING your medications to the hospital with you, UNLESS something has changed since your PRE-Admission Testing appointment.  AFTER 2/11/25 Hold all vitamins, supplements, and NSAIDS (Non- steroidal anti-inflammatory meds) for one week prior to surgery (you MAY take Tylenol or Acetaminophen).  If you are diabetic, check your blood sugar the morning of your procedure. If it is less than 70 or if you are feeling symptomatic, call the following number for further instructions: [phone]  Use your inhalers/nebulizers as usual, the morning of your procedure. BRING YOUR INHALERS with you.   Bring your CPAP or BIPAP to hospital, ONLY IF YOU WILL BE SPENDING THE NIGHT.   Make sure you have a ride home and have someone who will stay with you the day of your procedure after you go home.  If you have any questions, please call your Pre-Admission Testing Nurse, ___PEREZ_____________ at [phone]____________.   Per anesthesia request, do not smoke for 24 hours before your procedure or as instructed by your surgeon.    WILL CALL ON  2/18/25 NORMALLY BETWEEN 1 AND 4 PM TO GIVE OFFICIAL ARRIVAL TIME FOR DAY OF PROCEDURE  REFER TO BATHING SHEET FOR BATHING INSTRUCTIONS . NO JEWELRY OF ANY TYPE OR NAIL POLISH UPPER OR LOWER EXT DAY OF PROCEDURE  COME TO [facility] [address] DAY OF PROCEDURE. GET ON ELEVATOR AND COME TO FIRST FLOOR TAKE LEFT  OFF OF ELEVATOR.   CASH,  OR CARD FOR MEDS TO BED IF INDICATED AT DISCHARGE  CHECKING WITH HOA [facility] IN REGARDS TO YOUR 81 MG ASA. AS SOON AS KNOW SOMETHING WILL LET YOU KNOW

## 2025-02-11 ENCOUNTER — ANESTHESIA EVENT (OUTPATIENT)
Dept: PERIOP | Facility: HOSPITAL | Age: 65
End: 2025-02-11
Payer: MEDICARE

## 2025-02-12 LAB
QT INTERVAL: 432 MS
QTC INTERVAL: 435 MS

## 2025-02-14 ENCOUNTER — TELEPHONE (OUTPATIENT)
Dept: ORTHOPEDIC SURGERY | Facility: CLINIC | Age: 65
End: 2025-02-14
Payer: MEDICARE

## 2025-02-14 NOTE — TELEPHONE ENCOUNTER
Caller: CARLY     Relationship to patient: SELF    Best call back number: [phone]    Patient is needing:RECEIVED CALL FROM  [facility] SOME TYPE OF HEAT EQUIPMENT PRESCRIBED BY DR. LYNN FOR AFTER SURGERY ? IS THIS LEGITIMATE PLEASE REACH OUT AND ADVISE

## 2025-02-17 NOTE — TELEPHONE ENCOUNTER
LVM FOR PATIENT REQUESTING RETURN CALL TO DISCUSS. LET HER KNOW IN MESSAGE THAT UNIT IS LEGIT BUT OPTIONAL.

## 2025-02-19 ENCOUNTER — ANESTHESIA EVENT CONVERTED (OUTPATIENT)
Dept: ANESTHESIOLOGY | Facility: HOSPITAL | Age: 65
End: 2025-02-19
Payer: MEDICARE

## 2025-02-19 ENCOUNTER — HOSPITAL ENCOUNTER (OUTPATIENT)
Facility: HOSPITAL | Age: 65
Discharge: HOME | End: 2025-02-20
Attending: ORTHOPAEDIC SURGERY | Admitting: ORTHOPAEDIC SURGERY
Payer: MEDICARE

## 2025-02-19 ENCOUNTER — ANESTHESIA (OUTPATIENT)
Dept: PERIOP | Facility: HOSPITAL | Age: 65
End: 2025-02-19
Payer: MEDICARE

## 2025-02-19 ENCOUNTER — APPOINTMENT (OUTPATIENT)
Dept: GENERAL RADIOLOGY | Facility: HOSPITAL | Age: 65
End: 2025-02-19
Payer: MEDICARE

## 2025-02-19 DIAGNOSIS — S46.011D TRAUMATIC COMPLETE TEAR OF RIGHT ROTATOR CUFF, SUBSEQUENT ENCOUNTER: ICD-10-CM

## 2025-02-19 DIAGNOSIS — R26.2 DIFFICULTY IN WALKING: Primary | ICD-10-CM

## 2025-02-19 LAB
ANION GAP SERPL CALCULATED.3IONS-SCNC: 12.1 MMOL/L (ref 5–15)
BUN SERPL-MCNC: 12 MG/DL (ref 8–23)
BUN/CREAT SERPL: 12.8 (ref 7–25)
CALCIUM SPEC-SCNC: 9.1 MG/DL (ref 8.6–10.5)
CHLORIDE SERPL-SCNC: 104 MMOL/L (ref 98–107)
CO2 SERPL-SCNC: 19.9 MMOL/L (ref 22–29)
CREAT SERPL-MCNC: 0.94 MG/DL (ref 0.57–1)
DEPRECATED RDW RBC AUTO: 43.9 FL (ref 37–54)
EGFRCR SERPLBLD CKD-EPI 2021: 67.1 ML/MIN/1.73
ERYTHROCYTE [DISTWIDTH] IN BLOOD BY AUTOMATED COUNT: 12.9 % (ref 12.3–15.4)
GLUCOSE SERPL-MCNC: 150 MG/DL (ref 65–99)
HCT VFR BLD AUTO: 39.4 % (ref 34–46.6)
HGB BLD-MCNC: 13.6 G/DL (ref 12–15.9)
MCH RBC QN AUTO: 32.4 PG (ref 26.6–33)
MCHC RBC AUTO-ENTMCNC: 34.5 G/DL (ref 31.5–35.7)
MCV RBC AUTO: 93.8 FL (ref 79–97)
PLATELET # BLD AUTO: 179 10*3/MM3 (ref 140–450)
PMV BLD AUTO: 9.4 FL (ref 6–12)
POTASSIUM SERPL-SCNC: 4.1 MMOL/L (ref 3.5–5.2)
RBC # BLD AUTO: 4.2 10*6/MM3 (ref 3.77–5.28)
SODIUM SERPL-SCNC: 136 MMOL/L (ref 136–145)
WBC NRBC COR # BLD AUTO: 7.67 10*3/MM3 (ref 3.4–10.8)

## 2025-02-19 PROCEDURE — 25810000003 LACTATED RINGERS PER 1000 ML: Performed by: ANESTHESIOLOGY

## 2025-02-19 PROCEDURE — 73020 X-RAY EXAM OF SHOULDER: CPT

## 2025-02-19 PROCEDURE — 25010000002 ONDANSETRON PER 1 MG: Performed by: NURSE ANESTHETIST, CERTIFIED REGISTERED

## 2025-02-19 PROCEDURE — 25010000002 SUGAMMADEX 200 MG/2ML SOLUTION: Performed by: NURSE ANESTHETIST, CERTIFIED REGISTERED

## 2025-02-19 PROCEDURE — 25010000002 FENTANYL CITRATE (PF) 50 MCG/ML SOLUTION: Performed by: NURSE ANESTHETIST, CERTIFIED REGISTERED

## 2025-02-19 PROCEDURE — 94799 UNLISTED PULMONARY SVC/PX: CPT

## 2025-02-19 PROCEDURE — 25010000002 CEFAZOLIN PER 500 MG: Performed by: ORTHOPAEDIC SURGERY

## 2025-02-19 PROCEDURE — 25010000002 LIDOCAINE PF 2% 2 % SOLUTION: Performed by: NURSE ANESTHETIST, CERTIFIED REGISTERED

## 2025-02-19 PROCEDURE — 23472 RECONSTRUCT SHOULDER JOINT: CPT | Performed by: PHYSICIAN ASSISTANT

## 2025-02-19 PROCEDURE — 23472 RECONSTRUCT SHOULDER JOINT: CPT | Performed by: ORTHOPAEDIC SURGERY

## 2025-02-19 PROCEDURE — 85027 COMPLETE CBC AUTOMATED: CPT | Performed by: STUDENT IN AN ORGANIZED HEALTH CARE EDUCATION/TRAINING PROGRAM

## 2025-02-19 PROCEDURE — 94761 N-INVAS EAR/PLS OXIMETRY MLT: CPT

## 2025-02-19 PROCEDURE — 25010000002 PROPOFOL 10 MG/ML EMULSION: Performed by: NURSE ANESTHETIST, CERTIFIED REGISTERED

## 2025-02-19 PROCEDURE — 25010000002 DEXAMETHASONE PER 1 MG: Performed by: NURSE ANESTHETIST, CERTIFIED REGISTERED

## 2025-02-19 PROCEDURE — C1776 JOINT DEVICE (IMPLANTABLE): HCPCS | Performed by: ORTHOPAEDIC SURGERY

## 2025-02-19 PROCEDURE — 25010000002 MIDAZOLAM PER 1MG: Performed by: ANESTHESIOLOGY

## 2025-02-19 PROCEDURE — 25010000002 ROPIVACAINE PER 1 MG: Performed by: ANESTHESIOLOGY

## 2025-02-19 PROCEDURE — L3670 SO ACRO/CLAV CAN WEB PRE OTS: HCPCS | Performed by: ORTHOPAEDIC SURGERY

## 2025-02-19 PROCEDURE — 25810000003 LACTATED RINGERS PER 1000 ML: Performed by: ORTHOPAEDIC SURGERY

## 2025-02-19 PROCEDURE — 80048 BASIC METABOLIC PNL TOTAL CA: CPT | Performed by: STUDENT IN AN ORGANIZED HEALTH CARE EDUCATION/TRAINING PROGRAM

## 2025-02-19 PROCEDURE — 63710000001 PROMETHAZINE PER 25 MG: Performed by: ORTHOPAEDIC SURGERY

## 2025-02-19 PROCEDURE — 99213 OFFICE O/P EST LOW 20 MIN: CPT | Performed by: STUDENT IN AN ORGANIZED HEALTH CARE EDUCATION/TRAINING PROGRAM

## 2025-02-19 DEVICE — SCRW FIX LK HEX 4.75X30MM: Type: IMPLANTABLE DEVICE | Site: SHOULDER | Status: FUNCTIONAL

## 2025-02-19 DEVICE — SCRW COMPRNSV CNTRL 6.5X25MM REUS: Type: IMPLANTABLE DEVICE | Site: SHOULDER | Status: FUNCTIONAL

## 2025-02-19 DEVICE — SUT FW #2 W/TPR NDL 1/2 CIR 38IN 97CM 26.5MM BLU: Type: IMPLANTABLE DEVICE | Site: SHOULDER | Status: FUNCTIONAL

## 2025-02-19 DEVICE — TOTL SHLDER REV: Type: IMPLANTABLE DEVICE | Site: SHOULDER | Status: FUNCTIONAL

## 2025-02-19 DEVICE — BEAR HUM PROLNG STD 36MM: Type: IMPLANTABLE DEVICE | Site: SHOULDER | Status: FUNCTIONAL

## 2025-02-19 DEVICE — BASEPLT GLEN COMPR MINI W TPR ADAPTR 25: Type: IMPLANTABLE DEVICE | Site: SHOULDER | Status: FUNCTIONAL

## 2025-02-19 DEVICE — GLENOSPHERE VERSA DIAL FIX STD 36MM: Type: IMPLANTABLE DEVICE | Site: SHOULDER | Status: FUNCTIONAL

## 2025-02-19 DEVICE — TRY HUM/SHLDR COMPREHENSIVE/REVERSE MINI COCR STD 40MM: Type: IMPLANTABLE DEVICE | Site: SHOULDER | Status: FUNCTIONAL

## 2025-02-19 DEVICE — STEM HUM/SHLDR COMPREHENSIVE MINI 11X83MM: Type: IMPLANTABLE DEVICE | Site: SHOULDER | Status: FUNCTIONAL

## 2025-02-19 DEVICE — SCRW FIX LK HEX 4.75X15MM: Type: IMPLANTABLE DEVICE | Site: SHOULDER | Status: FUNCTIONAL

## 2025-02-19 RX ORDER — FERROUS SULFATE 325(65) MG
325 TABLET ORAL
Status: DISCONTINUED | OUTPATIENT
Start: 2025-02-20 | End: 2025-02-20 | Stop reason: HOSPADM

## 2025-02-19 RX ORDER — PROMETHAZINE HYDROCHLORIDE 12.5 MG/1
12.5 SUPPOSITORY RECTAL EVERY 6 HOURS PRN
Status: DISCONTINUED | OUTPATIENT
Start: 2025-02-19 | End: 2025-02-20 | Stop reason: HOSPADM

## 2025-02-19 RX ORDER — HYDROCODONE BITARTRATE AND ACETAMINOPHEN 7.5; 325 MG/1; MG/1
2 TABLET ORAL EVERY 4 HOURS PRN
Status: DISCONTINUED | OUTPATIENT
Start: 2025-02-19 | End: 2025-02-20 | Stop reason: HOSPADM

## 2025-02-19 RX ORDER — ROSUVASTATIN CALCIUM 20 MG/1
40 TABLET, COATED ORAL NIGHTLY
Status: DISCONTINUED | OUTPATIENT
Start: 2025-02-19 | End: 2025-02-20 | Stop reason: HOSPADM

## 2025-02-19 RX ORDER — NALOXONE HCL 0.4 MG/ML
0.4 VIAL (ML) INJECTION
Status: DISCONTINUED | OUTPATIENT
Start: 2025-02-19 | End: 2025-02-20 | Stop reason: HOSPADM

## 2025-02-19 RX ORDER — LIDOCAINE HYDROCHLORIDE 20 MG/ML
INJECTION, SOLUTION EPIDURAL; INFILTRATION; INTRACAUDAL; PERINEURAL AS NEEDED
Status: DISCONTINUED | OUTPATIENT
Start: 2025-02-19 | End: 2025-02-19 | Stop reason: SURG

## 2025-02-19 RX ORDER — SODIUM CHLORIDE 9 MG/ML
40 INJECTION, SOLUTION INTRAVENOUS AS NEEDED
Status: DISCONTINUED | OUTPATIENT
Start: 2025-02-19 | End: 2025-02-19 | Stop reason: HOSPADM

## 2025-02-19 RX ORDER — LOSARTAN POTASSIUM 25 MG/1
25 TABLET ORAL
Status: DISCONTINUED | OUTPATIENT
Start: 2025-02-19 | End: 2025-02-20 | Stop reason: HOSPADM

## 2025-02-19 RX ORDER — SODIUM CHLORIDE 0.9 % (FLUSH) 0.9 %
10 SYRINGE (ML) INJECTION AS NEEDED
Status: DISCONTINUED | OUTPATIENT
Start: 2025-02-19 | End: 2025-02-19 | Stop reason: HOSPADM

## 2025-02-19 RX ORDER — ACETAMINOPHEN 650 MG/1
650 SUPPOSITORY RECTAL EVERY 4 HOURS PRN
Status: DISCONTINUED | OUTPATIENT
Start: 2025-02-19 | End: 2025-02-20 | Stop reason: HOSPADM

## 2025-02-19 RX ORDER — HYDROCODONE BITARTRATE AND ACETAMINOPHEN 7.5; 325 MG/1; MG/1
1 TABLET ORAL EVERY 4 HOURS PRN
Status: DISCONTINUED | OUTPATIENT
Start: 2025-02-19 | End: 2025-02-20 | Stop reason: HOSPADM

## 2025-02-19 RX ORDER — CELECOXIB 100 MG/1
200 CAPSULE ORAL ONCE
Status: COMPLETED | OUTPATIENT
Start: 2025-02-19 | End: 2025-02-19

## 2025-02-19 RX ORDER — CARVEDILOL 6.25 MG/1
6.25 TABLET ORAL 2 TIMES DAILY WITH MEALS
Status: DISCONTINUED | OUTPATIENT
Start: 2025-02-19 | End: 2025-02-20 | Stop reason: HOSPADM

## 2025-02-19 RX ORDER — MIDAZOLAM HYDROCHLORIDE 2 MG/2ML
INJECTION, SOLUTION INTRAMUSCULAR; INTRAVENOUS AS NEEDED
Status: DISCONTINUED | OUTPATIENT
Start: 2025-02-19 | End: 2025-02-19 | Stop reason: SURG

## 2025-02-19 RX ORDER — FAMOTIDINE 20 MG/1
40 TABLET, FILM COATED ORAL DAILY
Status: DISCONTINUED | OUTPATIENT
Start: 2025-02-19 | End: 2025-02-20 | Stop reason: HOSPADM

## 2025-02-19 RX ORDER — TRANEXAMIC ACID 10 MG/ML
1000 INJECTION, SOLUTION INTRAVENOUS ONCE
Status: DISCONTINUED | OUTPATIENT
Start: 2025-02-19 | End: 2025-02-19 | Stop reason: HOSPADM

## 2025-02-19 RX ORDER — SODIUM CHLORIDE 0.9 % (FLUSH) 0.9 %
10 SYRINGE (ML) INJECTION AS NEEDED
Status: DISCONTINUED | OUTPATIENT
Start: 2025-02-19 | End: 2025-02-20 | Stop reason: HOSPADM

## 2025-02-19 RX ORDER — ROPIVACAINE HYDROCHLORIDE 5 MG/ML
INJECTION, SOLUTION EPIDURAL; INFILTRATION; PERINEURAL
Status: COMPLETED | OUTPATIENT
Start: 2025-02-19 | End: 2025-02-19

## 2025-02-19 RX ORDER — EPHEDRINE SULFATE 50 MG/ML
INJECTION INTRAVENOUS AS NEEDED
Status: DISCONTINUED | OUTPATIENT
Start: 2025-02-19 | End: 2025-02-19 | Stop reason: SURG

## 2025-02-19 RX ORDER — TRANEXAMIC ACID 10 MG/ML
1000 INJECTION, SOLUTION INTRAVENOUS ONCE
Status: COMPLETED | OUTPATIENT
Start: 2025-02-19 | End: 2025-02-19

## 2025-02-19 RX ORDER — DOCUSATE SODIUM 100 MG/1
100 CAPSULE, LIQUID FILLED ORAL 2 TIMES DAILY PRN
Status: DISCONTINUED | OUTPATIENT
Start: 2025-02-19 | End: 2025-02-20 | Stop reason: HOSPADM

## 2025-02-19 RX ORDER — SODIUM CHLORIDE 9 MG/ML
40 INJECTION, SOLUTION INTRAVENOUS AS NEEDED
Status: DISCONTINUED | OUTPATIENT
Start: 2025-02-19 | End: 2025-02-20 | Stop reason: HOSPADM

## 2025-02-19 RX ORDER — ONDANSETRON 2 MG/ML
INJECTION INTRAMUSCULAR; INTRAVENOUS AS NEEDED
Status: DISCONTINUED | OUTPATIENT
Start: 2025-02-19 | End: 2025-02-19 | Stop reason: SURG

## 2025-02-19 RX ORDER — ACETAMINOPHEN 325 MG/1
650 TABLET ORAL EVERY 4 HOURS PRN
Status: DISCONTINUED | OUTPATIENT
Start: 2025-02-19 | End: 2025-02-20 | Stop reason: HOSPADM

## 2025-02-19 RX ORDER — FENTANYL CITRATE 50 UG/ML
INJECTION, SOLUTION INTRAMUSCULAR; INTRAVENOUS AS NEEDED
Status: DISCONTINUED | OUTPATIENT
Start: 2025-02-19 | End: 2025-02-19 | Stop reason: SURG

## 2025-02-19 RX ORDER — SODIUM CHLORIDE, SODIUM LACTATE, POTASSIUM CHLORIDE, CALCIUM CHLORIDE 600; 310; 30; 20 MG/100ML; MG/100ML; MG/100ML; MG/100ML
100 INJECTION, SOLUTION INTRAVENOUS CONTINUOUS
Status: ACTIVE | OUTPATIENT
Start: 2025-02-19 | End: 2025-02-19

## 2025-02-19 RX ORDER — ONDANSETRON 2 MG/ML
4 INJECTION INTRAMUSCULAR; INTRAVENOUS ONCE AS NEEDED
Status: DISCONTINUED | OUTPATIENT
Start: 2025-02-19 | End: 2025-02-19 | Stop reason: HOSPADM

## 2025-02-19 RX ORDER — MAGNESIUM HYDROXIDE 1200 MG/15ML
LIQUID ORAL AS NEEDED
Status: DISCONTINUED | OUTPATIENT
Start: 2025-02-19 | End: 2025-02-19 | Stop reason: HOSPADM

## 2025-02-19 RX ORDER — SODIUM CHLORIDE 0.9 % (FLUSH) 0.9 %
3 SYRINGE (ML) INJECTION EVERY 12 HOURS SCHEDULED
Status: DISCONTINUED | OUTPATIENT
Start: 2025-02-19 | End: 2025-02-20 | Stop reason: HOSPADM

## 2025-02-19 RX ORDER — OXYCODONE HYDROCHLORIDE 5 MG/1
5 TABLET ORAL
Status: DISCONTINUED | OUTPATIENT
Start: 2025-02-19 | End: 2025-02-19 | Stop reason: HOSPADM

## 2025-02-19 RX ORDER — SPIRONOLACTONE 25 MG/1
25 TABLET ORAL DAILY
Status: DISCONTINUED | OUTPATIENT
Start: 2025-02-19 | End: 2025-02-20 | Stop reason: HOSPADM

## 2025-02-19 RX ORDER — ASPIRIN 81 MG/1
81 TABLET ORAL DAILY
Status: DISCONTINUED | OUTPATIENT
Start: 2025-02-19 | End: 2025-02-20 | Stop reason: HOSPADM

## 2025-02-19 RX ORDER — ACETAMINOPHEN 500 MG
1000 TABLET ORAL ONCE
Status: COMPLETED | OUTPATIENT
Start: 2025-02-19 | End: 2025-02-19

## 2025-02-19 RX ORDER — DEXAMETHASONE SODIUM PHOSPHATE 4 MG/ML
INJECTION, SOLUTION INTRA-ARTICULAR; INTRALESIONAL; INTRAMUSCULAR; INTRAVENOUS; SOFT TISSUE AS NEEDED
Status: DISCONTINUED | OUTPATIENT
Start: 2025-02-19 | End: 2025-02-19 | Stop reason: SURG

## 2025-02-19 RX ORDER — PROPOFOL 10 MG/ML
VIAL (ML) INTRAVENOUS AS NEEDED
Status: DISCONTINUED | OUTPATIENT
Start: 2025-02-19 | End: 2025-02-19 | Stop reason: SURG

## 2025-02-19 RX ORDER — ACETAMINOPHEN 325 MG/1
325 TABLET ORAL EVERY 4 HOURS PRN
Status: DISCONTINUED | OUTPATIENT
Start: 2025-02-19 | End: 2025-02-20 | Stop reason: HOSPADM

## 2025-02-19 RX ORDER — PROMETHAZINE HYDROCHLORIDE 25 MG/1
12.5 TABLET ORAL EVERY 6 HOURS PRN
Status: DISCONTINUED | OUTPATIENT
Start: 2025-02-19 | End: 2025-02-20 | Stop reason: HOSPADM

## 2025-02-19 RX ORDER — ROCURONIUM BROMIDE 10 MG/ML
INJECTION, SOLUTION INTRAVENOUS AS NEEDED
Status: DISCONTINUED | OUTPATIENT
Start: 2025-02-19 | End: 2025-02-19 | Stop reason: SURG

## 2025-02-19 RX ORDER — PROMETHAZINE HYDROCHLORIDE 25 MG/1
25 TABLET ORAL ONCE AS NEEDED
Status: DISCONTINUED | OUTPATIENT
Start: 2025-02-19 | End: 2025-02-19 | Stop reason: HOSPADM

## 2025-02-19 RX ORDER — SODIUM CHLORIDE, SODIUM LACTATE, POTASSIUM CHLORIDE, CALCIUM CHLORIDE 600; 310; 30; 20 MG/100ML; MG/100ML; MG/100ML; MG/100ML
9 INJECTION, SOLUTION INTRAVENOUS CONTINUOUS PRN
Status: DISCONTINUED | OUTPATIENT
Start: 2025-02-19 | End: 2025-02-19 | Stop reason: HOSPADM

## 2025-02-19 RX ORDER — PROMETHAZINE HYDROCHLORIDE 25 MG/1
25 SUPPOSITORY RECTAL ONCE AS NEEDED
Status: DISCONTINUED | OUTPATIENT
Start: 2025-02-19 | End: 2025-02-19 | Stop reason: HOSPADM

## 2025-02-19 RX ORDER — MEPERIDINE HYDROCHLORIDE 25 MG/ML
12.5 INJECTION INTRAMUSCULAR; INTRAVENOUS; SUBCUTANEOUS
Status: DISCONTINUED | OUTPATIENT
Start: 2025-02-19 | End: 2025-02-19 | Stop reason: HOSPADM

## 2025-02-19 RX ADMIN — SODIUM CHLORIDE, POTASSIUM CHLORIDE, SODIUM LACTATE AND CALCIUM CHLORIDE: 600; 310; 30; 20 INJECTION, SOLUTION INTRAVENOUS at 12:45

## 2025-02-19 RX ADMIN — DEXAMETHASONE SODIUM PHOSPHATE 8 MG: 4 INJECTION, SOLUTION INTRAMUSCULAR; INTRAVENOUS at 13:07

## 2025-02-19 RX ADMIN — ONDANSETRON 4 MG: 2 INJECTION INTRAMUSCULAR; INTRAVENOUS at 13:44

## 2025-02-19 RX ADMIN — OXYCODONE HYDROCHLORIDE 5 MG: 5 TABLET ORAL at 14:51

## 2025-02-19 RX ADMIN — ACETAMINOPHEN 1000 MG: 500 TABLET ORAL at 11:56

## 2025-02-19 RX ADMIN — CELECOXIB 200 MG: 100 CAPSULE ORAL at 11:56

## 2025-02-19 RX ADMIN — ASPIRIN 81 MG: 81 TABLET, COATED ORAL at 17:12

## 2025-02-19 RX ADMIN — ROCURONIUM BROMIDE 50 MG: 50 INJECTION INTRAVENOUS at 12:48

## 2025-02-19 RX ADMIN — EPHEDRINE SULFATE 10 MG: 50 INJECTION INTRAVENOUS at 13:52

## 2025-02-19 RX ADMIN — EPHEDRINE SULFATE 15 MG: 50 INJECTION INTRAVENOUS at 13:08

## 2025-02-19 RX ADMIN — FAMOTIDINE 40 MG: 20 TABLET, FILM COATED ORAL at 16:00

## 2025-02-19 RX ADMIN — MIDAZOLAM HYDROCHLORIDE 4 MG: 1 INJECTION, SOLUTION INTRAMUSCULAR; INTRAVENOUS at 12:02

## 2025-02-19 RX ADMIN — EPHEDRINE SULFATE 10 MG: 50 INJECTION INTRAVENOUS at 13:16

## 2025-02-19 RX ADMIN — ROSUVASTATIN 40 MG: 20 TABLET, FILM COATED ORAL at 20:57

## 2025-02-19 RX ADMIN — SUGAMMADEX 200 MG: 100 INJECTION, SOLUTION INTRAVENOUS at 14:09

## 2025-02-19 RX ADMIN — TRANEXAMIC ACID 1000 MG: 10 INJECTION, SOLUTION INTRAVENOUS at 12:08

## 2025-02-19 RX ADMIN — LIDOCAINE HYDROCHLORIDE 80 MG: 20 INJECTION, SOLUTION INTRAVENOUS at 12:48

## 2025-02-19 RX ADMIN — EPHEDRINE SULFATE 10 MG: 50 INJECTION INTRAVENOUS at 13:28

## 2025-02-19 RX ADMIN — SODIUM CHLORIDE 2 G: 9 INJECTION, SOLUTION INTRAVENOUS at 20:58

## 2025-02-19 RX ADMIN — ROCURONIUM BROMIDE 20 MG: 50 INJECTION INTRAVENOUS at 13:49

## 2025-02-19 RX ADMIN — EPHEDRINE SULFATE 15 MG: 50 INJECTION INTRAVENOUS at 13:01

## 2025-02-19 RX ADMIN — FENTANYL CITRATE 50 MCG: 50 INJECTION, SOLUTION INTRAMUSCULAR; INTRAVENOUS at 12:48

## 2025-02-19 RX ADMIN — ROPIVACAINE HYDROCHLORIDE 30 ML: 5 INJECTION, SOLUTION EPIDURAL; INFILTRATION; PERINEURAL at 12:05

## 2025-02-19 RX ADMIN — SODIUM CHLORIDE, SODIUM LACTATE, POTASSIUM CHLORIDE, CALCIUM CHLORIDE 100 ML/HR: 20; 30; 600; 310 INJECTION, SOLUTION INTRAVENOUS at 16:00

## 2025-02-19 RX ADMIN — TRANEXAMIC ACID 1000 MG: 100 INJECTION, SOLUTION INTRAVENOUS at 14:04

## 2025-02-19 RX ADMIN — PROMETHAZINE HYDROCHLORIDE 12.5 MG: 25 TABLET ORAL at 16:00

## 2025-02-19 RX ADMIN — SODIUM CHLORIDE 2 G: 9 INJECTION, SOLUTION INTRAVENOUS at 12:45

## 2025-02-19 RX ADMIN — PROPOFOL 150 MG: 10 INJECTION, EMULSION INTRAVENOUS at 12:48

## 2025-02-19 RX ADMIN — EPHEDRINE SULFATE 10 MG: 50 INJECTION INTRAVENOUS at 13:53

## 2025-02-19 NOTE — ANESTHESIA PREPROCEDURE EVALUATION
Anesthesia Evaluation     Patient summary reviewed and Nursing notes reviewed   no history of anesthetic complications:   NPO Solid Status: > 8 hours  NPO Liquid Status: > 2 hours           Airway   Mallampati: II  TM distance: >3 FB  Neck ROM: full  No difficulty expected  Dental    (+) poor dentition        Pulmonary - negative pulmonary ROS and normal exam    breath sounds clear to auscultation  Cardiovascular - normal exam  Exercise tolerance: good (4-7 METS)    Rhythm: regular  Rate: normal    (+) hypertension, CAD, cardiac stents (2021) , hyperlipidemia      Neuro/Psych  (+) tremors, psychiatric history Anxiety  GI/Hepatic/Renal/Endo    (+) GERD well controlled    Musculoskeletal (-) negative ROS    Abdominal    Substance History - negative use     OB/GYN negative ob/gyn ROS         Other - negative ROS       ROS/Med Hx Other: >4METS.HX HTN,HLD,GERD,ANXIETY,ESSENTIAL TREMORS, NSTEMI 8/12/21,  CATH/STENT X1 LAD 8/12/21,  STRESS 9/15/21 NORMAL STUDY, ECHO 9/15/21   EF 61-65%,NO VALVE STENOSIS.   CARD CLEARANCE/MOD RISK/MED DIRECTIVE 2/10/25. KT     Off of plavix for over 1 month (cardiologist stopped it at last appt because she no longer requires DAPT)              Anesthesia Plan    ASA 3     general and regional     (Patient understands anesthesia not responsible for dental damage. Regional anesthesia options discussed with patient. Pt accepts regional block.)  intravenous induction     Anesthetic plan, risks, benefits, and alternatives have been provided, discussed and informed consent has been obtained with: patient.    Use of blood products discussed with patient .    Plan discussed with CRNA.    CODE STATUS:

## 2025-02-19 NOTE — ANESTHESIA POSTPROCEDURE EVALUATION
Patient: Carley Deng    Procedure Summary       Date: 02/19/25 Room / Location: [facility] OR 02 / [facility] MAIN OR    Anesthesia Start: 1245 Anesthesia Stop: 1427    Procedure: RIGHT TOTAL SHOULDER REVERSE ARTHROPLASTY (Right: Shoulder) Diagnosis:       Traumatic complete tear of right rotator cuff, subsequent encounter      (Traumatic complete tear of right rotator cuff, subsequent encounter [S46.011D])    Surgeons: Gaurang Downing MD Provider: Adrian Fragoso MD    Anesthesia Type: general, regional ASA Status: 3            Anesthesia Type: general, regional    Vitals  Vitals Value Taken Time   /66 02/19/25 1536   Temp 36.2 °C (97.2 °F) 02/19/25 1500   Pulse 62 02/19/25 1539   Resp 16 02/19/25 1500   SpO2 94 % 02/19/25 1539   Vitals shown include unfiled device data.        Post Anesthesia Care and Evaluation    Patient location during evaluation: bedside  Patient participation: complete - patient participated  Level of consciousness: awake    Airway patency: patent  PONV Status: none  Cardiovascular status: acceptable  Respiratory status: acceptable  Hydration status: acceptable

## 2025-02-19 NOTE — H&P
[facility]   HISTORY AND PHYSICAL    Patient Name: Carley Deng  : 1960  MRN: [id number]  Primary Care Physician:  Alex Cooper MD  Date of admission: (Not on file)    Subjective   Subjective     Chief Complaint: Right shoulder pain    History of Present Illness: The patient is a 64-year-old female with right shoulder pain and weakness.  The symptoms have been worsening despite conservative treatment.  The patient wishes to undergo operative treatment with shoulder replacement surgery.    Review of Systems : Negative except for those mentioned in the history of present illness    Personal History     Past Medical History:   Diagnosis Date    Abdominal bloating 04/15/2022    Added automatically from request for surgery [id number]    Anxiety     [place] esophagus     Benign essential tremor     Cholelithiasis     Had Gallbladder Removed    Coronary artery disease     HAD NSTEMI WITH STENT IN LAD PLACED 2021. FOLLOWED BY DR BAEZ. DENIES CP/SOA. REPORTS IS ACTIVE    Depression     Diarrhea 04/15/2022    HAS OCC ISSUES.    Diverticulosis     Epigastric pain 04/15/2022    Added automatically from request for surgery [id number]    GERD (gastroesophageal reflux disease)     Hyperlipemia     Hypertension     Irritable bowel syndrome     Lactose intolerance     Limb swelling     DENIES ANY ISSUES    Lumbago     NSTEMI (non-ST elevated myocardial infarction)     2021 HAD STENT PLACED IN LAD    Seasonal allergies     Traumatic complete tear of right rotator cuff        Past Surgical History:   Procedure Laterality Date    CARDIAC CATHETERIZATION Left 2021    Procedure: Left Heart Cath with coronary angiography and possible right heart cath. Possible PTCA/stent Insertion.;  Surgeon: Dc Mckeon MD;  Location: [facility] INVASIVE LOCATION;  Service: Cardiovascular;  Laterality: Left;     SECTION      X1    CHOLECYSTECTOMY      COLONOSCOPY      COLONOSCOPY  N/A 08/17/2022    Procedure: COLONOSCOPY WITH BIOPSIES;  Surgeon: Willow Paniagua MD;  Location: [facility] ENDOSCOPY;  Service: Gastroenterology;  Laterality: N/A;  HEMORRHOIDS  DIVERTICULOSIS    CORONARY STENT PLACEMENT      ENDOSCOPY  2010    ENDOSCOPY N/A 08/17/2022    Procedure: ESOPHAGOGASTRODUODENOSCOPY WITH BIOPSIES;  Surgeon: Willow Paniagua MD;  Location: [facility] ENDOSCOPY;  Service: Gastroenterology;  Laterality: N/A;  GASTRITIS    ENDOSCOPY N/A 06/23/2023    Procedure: ESOPHAGOGASTRODUODENOSCOPY with biopsy;  Surgeon: Willow Paniagua MD;  Location: [facility] ENDOSCOPY;  Service: Gastroenterology;  Laterality: N/A;  gastric polyps    HYSTERECTOMY  1990    TUBAL ABDOMINAL LIGATION  1984    UPPER GASTROINTESTINAL ENDOSCOPY  2022       Family History: family history includes Coronary artery disease in her father and mother. Otherwise pertinent FHx was reviewed and not pertinent to current issue.    Social History:  reports that she has never smoked. She has never been exposed to tobacco smoke. She has never used smokeless tobacco. She reports that she does not drink alcohol and does not use drugs.    Home Medications:  LORazepam, Vortioxetine HBr, aspirin, carvedilol, irbesartan, rosuvastatin, simethicone, and spironolactone    Allergies:  Allergies   Allergen Reactions    Egg-Derived Products Anaphylaxis    Erythromycin Hives     AS A CHILD        Objective    Objective     Vitals:        Physical Exam  General: No apparent distress, alert and oriented x 3  HEENT: Normocephalic/atraumatic  Neck: Supple  Cardiovascular: Regular heart rate  Chest: Unlabored breathing  Abdomen: Soft, nontender nondistended  Musculoskeletal: Tender to palpation to the right shoulder.  Reduced shoulder strength and range of motion.  Positive impingement.  Neurovascular intact extremity.  Positive pulses.  Neurological: Grossly intact    Result Review    Result Review:  I have personally reviewed the results from  the time of this admission to 2/18/2025 20:58 EST and agree with these findings:  []  Laboratory list / accordion  []  Microbiology  [x]  Radiology  []  EKG/Telemetry   []  Cardiology/Vascular   []  Pathology  []  Old records  []  Other:  Most notable findings include: X-rays and MRI: Massive rotator cuff tear, osteoarthritis      Assessment & Plan   Assessment / Plan     Brief Patient Summary:  Carley Deng is a 64 y.o. female who has right shoulder rotator cuff tear, arthritis    Active Hospital Problems:  Active Hospital Problems    Diagnosis     **Traumatic complete tear of right rotator cuff      Plan:   I discussed treatment options with the patient.  Operative versus nonoperative treatment was discussed.  Risks and benefits of surgery were discussed.  Informed consent was obtained and the patient wished to proceed with right reverse total shoulder arthroplasty.    VTE Prophylaxis:  No VTE prophylaxis order currently exists.        CODE STATUS:       Admission Status:  I believe this patient meets outpatient status.    Gaurang Downing MD

## 2025-02-19 NOTE — OP NOTE
TOTAL SHOULDER REVERSE ARTHROPLASTY  Procedure Report    Patient Name:  Carley Deng  YOB: 1960    Date of Surgery:  2/19/2025     Indications:  Patient has a rotator cuff tear and shoulder arthritis and has failed conservative treatment and wishes to undergo operative treatment. Risks and benefits of operative treatment including bleeding, infection, damage to neurovascular structures, continued pain and disability, need for additional procedures, among others. Informed consent was obtained and they wished to proceed.    Pre-op Diagnosis:   Traumatic complete tear of right rotator cuff, subsequent encounter [S46.011D]       Post-Op Diagnosis Codes:     * Traumatic complete tear of right rotator cuff, subsequent encounter [S46.011D]    Procedure/CPT® Codes:  LA ARTHROPLASTY GLENOHUMERAL JOINT TOTAL SHOULDER [36706]    Procedure(s):  RIGHT TOTAL SHOULDER REVERSE ARTHROPLASTY    Staff:  Surgeon(s):  Gaurang Downing MD    Assistant: Carlo Ayala PA    Anesthesia: General    Estimated Blood Loss: 100 mL    Implants:    Implant Name Type Inv. Item Serial No.  Lot No. LRB No. Used Action   SUT FW #2 W/TPR NDL 1/2 CIR 38IN 97CM 26.5MM KASSIDY - [id number] Implant SUT FW #2 W/TPR NDL 1/2 CIR 38IN 97CM 26.5MM KASSIDY  [id number] Right 1 Implanted   BASEPLT TAMIKO COMPR MINI W TPR ADAPTR 25 - [id number] Implant BASEPLT TAMIKO COMPR MINI W TPR ADAPTR 25  [employer] [id number] Right 1 Implanted   GLENOSPHERE VERSA DIAL FIX STD 36MM - [id number] Implant GLENOSPHERE VERSA DIAL FIX STD 36MM  [employer] [id number] Right 1 Implanted   SCRW FIX LK HEX 4.77V14EB - [id number] Implant SCRW FIX LK HEX 4.19J63JC  [employer] [id number] Right 1 Implanted   SCRW FIX LK HEX 4.53L78PX - [id number] Implant SCRW FIX LK HEX 4.03L22DW  [employer] [id number] Right 1 Implanted   SUT FW #2 W/TPR NDL 1/2 CIR 38IN 97CM 26.5MM KASSIDY - [id number] Implant SUT FW #2 W/TPR NDL 1/2 CIR 38IN 97CM 26.5MM KASSIDY  ARTHREX  [id number] Right 1 Implanted   SCRW COMPRNSV CNTRL 6.5X25MM REUS - [id number] Implant SCRW COMPRNSV CNTRL 6.5X25MM REUS  [employer] [id number] Right 1 Implanted   SCRW FIX LK HEX 4.62Y12HV - [id number] Implant SCRW FIX LK HEX 4.91B98WF  [employer] [id number] Right 1 Implanted   SCRW FIX LK HEX 4.50S78UC - [id number] Implant SCRW FIX LK HEX 4.93S87KY  [employer] [id number] Right 1 Implanted   STEM HUM/SHLDR COMPREHENSIVE MINI 49O70JH - [id number] Implant STEM HUM/SHLDR COMPREHENSIVE MINI [id number]  [employer] [id number] Right 1 Implanted   BEAR HUM PROLNG STD 36MM - [id number] Implant BEAR HUM PROLNG STD 36MM  [employer] [id number] Right 1 Implanted   TRY HUM/SHLDR COMPREHENSIVE/REVERSE MINI COCR STD 40MM - [id number] Implant TRY HUM/SHLDR COMPREHENSIVE/REVERSE MINI COCR STD 40MM  [employer] [id number] Right 1 Implanted       Specimen:          None        Findings: shoulder arthritis, rotator cuff tear    Complications: None    Description of Procedure: The operative site was marked in preoperative holding area.  The patient received a preoperative interscalene nerve block by anesthesia.  The patient was brought the operating room where general anesthesia was applied.  The patient was placed in a modified beach chair position.  The patient's head was secured and bony prominences were padded.  SCD boots were placed on the lower extremities.  The patient's unaffected arm was placed in an arm fernández.  The operative arm was prepped and draped in usual sterile fashion.  Preoperative antibiotic was given.  Tranexamic acid was given intravenously at the beginning and end of the procedure.  Formal timeout was held.  An anterior incision was made over the  deltopectoral interval.  The cephalic vein was retracted medially.  The clavipectoral fascia was incised.  There was a massive rotator cuff tear involving the subscapularis as well as the supra and infraspinatus.  The biceps tendon was thickened and there was fluid around  the joint and the biceps tendon.  The tendon was tenodesed to the upper border the pectoralis major with #2 FiberWire.  The arm was externally rotated as the capsule was released off the humeral head.  The shoulder was dislocated and we began reaming the humeral canal.  Reaming up to a size 11 where there was good cortical contact.  We then made our proximal humeral cut at 20 degrees retroversion using the guide.  We then broached up to a size 11 where there was good axial and rotational stability.  This point the broach was removed and the arm was positioned in flexion and external rotation to visualize the glenoid.  The biceps tendon and labrum was excised.  Guidepin was placed at the center of the glenoid and the glenoid was reamed and drilled.  The mini baseplate was placed and secured with a central nonlocking screw.  Peripheral locking screws were placed and an offset glenosphere with the offset inferiorly was placed.  The glenosphere was impacted into position and was secured.    At this point the proximal humerus was exposed.  The broach was replaced and was trialed.  The trial had excellent stability and range of motion.  Therefore the appropriate size humeral stem was placed and the tray and insert were assembled and placed onto the humeral stem.  The shoulder was reduced and stability was unchanged.  The wound was then irrigated with irrisept and saline.  The deltopectoral interval was closed with #1 Vicryl in figure-of-eight fashion and the subcutaneous tissues were closed with 2-0 Vicryl.  The skin was reapproximated with staples and an Aquacel dressing was placed.  Cold therapy and immobilizer were placed.  Patient awoke from anesthesia in stable condition.  There were no complications.  All counts were correct and patient was stable to recovery.    Assistant: Carlo Ayala PA  was responsible for performing the following activities: Retraction, Suction, Irrigation, and Placing Dressing  and their skilled assistance was necessary for the success of this case.    SURGICAL APPROACH: Deltopectoral      SURGICAL TECHNIQUE: Tenotomy        Gaurang Downing MD     Date: 2/19/2025  Time: 15:39 EST

## 2025-02-19 NOTE — PLAN OF CARE
Goal Outcome Evaluation:  Plan of Care Reviewed With: patient           Outcome Evaluation: Patient axox4. Complaints of back pain and nausea. Heating pad order obtained. Neurovacsular checks completed per charting. Up in chair.

## 2025-02-19 NOTE — H&P
[facility]   HOSPITALIST HISTORY AND PHYSICAL  Date: 2025   Patient Name: Carley Deng  : 1960  MRN: [id number]  Primary Care Physician:  Alex Cooper MD  Date of admission: 2025    Subjective   Subjective     Chief Complaint: Right shoulder pain    HPI:    Carley Deng is a 64 y.o. female past medical history of CAD, essential hypertension and depression presents to the hospital for elective shoulder surgery.  Patient has been dealing with right shoulder rotator cuff tear since November after a chair fell on her shoulder.  Patient at the end decided to go with surgery with Dr. Downing.  Patient underwent right total shoulder reverse arthroplasty with Dr. Downing today.  Patient was seen at bedside postsurgery no acute distress.  Patient admitted for observation for further management.  Hospitalist consulted for medical management.    Personal History     Past Medical History:  Past Medical History:   Diagnosis Date    Abdominal bloating 04/15/2022    Added automatically from request for surgery [id number]    Anxiety     G[place] esophagus     Benign essential tremor     Cholelithiasis     Had Gallbladder Removed    Coronary artery disease     HAD NSTEMI WITH STENT IN LAD PLACED 2021. FOLLOWED BY DR BAEZ. DENIES CP/SOA. REPORTS IS ACTIVE    Depression     Diarrhea 04/15/2022    HAS OCC ISSUES.    Diverticulosis     Epigastric pain 04/15/2022    Added automatically from request for surgery [id number]    GERD (gastroesophageal reflux disease)     Hyperlipemia     Hypertension     Irritable bowel syndrome     Lactose intolerance     Limb swelling     DENIES ANY ISSUES    Lumbago     NSTEMI (non-ST elevated myocardial infarction)     2021 HAD STENT PLACED IN LAD    Seasonal allergies     Traumatic complete tear of right rotator cuff        Past Surgical History:  Past Surgical History:   Procedure Laterality Date    CARDIAC CATHETERIZATION Left  2021    Procedure: Left Heart Cath with coronary angiography and possible right heart cath. Possible PTCA/stent Insertion.;  Surgeon: Dc Mckeon MD;  Location: [facility] CATH INVASIVE LOCATION;  Service: Cardiovascular;  Laterality: Left;     SECTION      X1    CHOLECYSTECTOMY      COLONOSCOPY      COLONOSCOPY N/A 2022    Procedure: COLONOSCOPY WITH BIOPSIES;  Surgeon: Willow Paniagua MD;  Location: [facility] ENDOSCOPY;  Service: Gastroenterology;  Laterality: N/A;  HEMORRHOIDS  DIVERTICULOSIS    CORONARY STENT PLACEMENT      ENDOSCOPY      ENDOSCOPY N/A 2022    Procedure: ESOPHAGOGASTRODUODENOSCOPY WITH BIOPSIES;  Surgeon: Willow Paniagua MD;  Location: [facility] ENDOSCOPY;  Service: Gastroenterology;  Laterality: N/A;  GASTRITIS    ENDOSCOPY N/A 2023    Procedure: ESOPHAGOGASTRODUODENOSCOPY with biopsy;  Surgeon: Willow Paniagua MD;  Location: [facility] ENDOSCOPY;  Service: Gastroenterology;  Laterality: N/A;  gastric polyps    HYSTERECTOMY      TUBAL ABDOMINAL LIGATION      UPPER GASTROINTESTINAL ENDOSCOPY         Family History:   Family History   Problem Relation Age of Onset    Coronary artery disease Mother     Coronary artery disease Father     Malig Hyperthermia Neg Hx        Social History:   Social History     Socioeconomic History    Marital status:    Tobacco Use    Smoking status: Never     Passive exposure: Never    Smokeless tobacco: Never   Vaping Use    Vaping status: Never Used   Substance and Sexual Activity    Alcohol use: Never    Drug use: Never    Sexual activity: Defer       Home Medications:  LORazepam, Vortioxetine HBr, aspirin, carvedilol, irbesartan, rosuvastatin, simethicone, and spironolactone    Allergies:  Allergies   Allergen Reactions    Egg-Derived Products Anaphylaxis    Erythromycin Hives     AS A CHILD        Review of Systems   All systems were reviewed and negative except for: Above    Objective    Objective     Vitals:   Temp:  [97.2 °F (36.2 °C)-98.1 °F (36.7 °C)] 97.2 °F (36.2 °C)  Heart Rate:  [58-74] 66  Resp:  [14-18] 16  BP: (108-138)/(59-75) 134/67  Flow (L/min) (Oxygen Therapy):  [2-3] 2    Physical Exam    Constitutional: Awake, alert, no acute distress   Eyes: Pupils equal, sclerae anicteric, no conjunctival injection   HENT: NCAT, mucous membranes moist   Neck: Supple, no thyromegaly, no lymphadenopathy, trachea midline   Respiratory: Clear to auscultation bilaterally, nonlabored respirations    Cardiovascular: RRR, no murmurs, rubs, or gallops, palpable pedal pulses bilaterally   Gastrointestinal: Positive bowel sounds, soft, nontender, nondistended   Musculoskeletal: Sling in place on right shoulder   Psychiatric: Appropriate affect, cooperative   Neurologic: Oriented x 3, strength symmetric in all extremities, Cranial Nerves grossly intact to confrontation, speech clear   Skin: No rashes     Result Review    Result Review:  I have personally reviewed the results from the time of this admission to 2/19/2025 16:17 EST and agree with these findings:  [x]  Laboratory  []  Microbiology  [x]  Radiology  []  EKG/Telemetry   []  Cardiology/Vascular   []  Pathology  [x]  Old records  []  Other:      Assessment & Plan   Assessment / Plan     Assessment/Plan:     Assessment  Right rotator cuff status post reverse arthroplasty  History of CAD status post PCI  Essential hypertension  History of depression    Plan  Admit for observation  Telemetry  Monitor vitals  CBC BMP right shoulder x-ray reviewed  Pain meds as needed  Start home meds  PT OT consulted  DVT prophylaxis as per orthopedic  Orthopedic following      VTE Prophylaxis: Eliquis  Pharmacologic & mechanical VTE prophylaxis orders are present.        CODE STATUS:         Admission Status:  I believe this patient meets obs status.    Electronically signed by William Lewis MD, 02/19/25, 4:17 PM EST.

## 2025-02-19 NOTE — ANESTHESIA PROCEDURE NOTES
Peripheral Block      Patient reassessed immediately prior to procedure    Patient location during procedure: pre-op  Start time: 2/19/2025 12:01 PM  Stop time: 2/19/2025 12:05 PM  Reason for block: at surgeon's request and post-op pain management  Performed by  Anesthesiologist: Adrian Fragoso MD  Preanesthetic Checklist  Completed: patient identified, IV checked, site marked, risks and benefits discussed, surgical consent, monitors and equipment checked, pre-op evaluation and timeout performed  Prep:  Pt Position: supine (HOB elevated)  Sterile barriers:cap, washed/disinfected hands, sterile barriers, gloves, mask, partial drape and alcohol skin prep  Prep: ChloraPrep  Patient monitoring: blood pressure monitoring, continuous pulse oximetry and EKG  Procedure    Sedation: yes  Performed under: local infiltration  Guidance:ultrasound guided    ULTRASOUND INTERPRETATION.  Using ultrasound guidance a 22 G gauge needle was placed in close proximity to the brachial plexus nerve, at which point, under ultrasound guidance anesthetic was injected in the area of the nerve and spread of the anesthesia was seen on ultrasound in close proximity thereto.  There were no abnormalities seen on ultrasound; a digital image was taken; and the patient tolerated the procedure with no complications. Images:still images obtained, printed/placed on chart    Laterality:right  Block Type:interscalene  Injection Technique:single-shot  Needle Type:echogenic  Needle Gauge:22 G (2in)  Resistance on Injection: none    Medications Used: ropivacaine (NAROPIN) 0.5 % injection - Injection   30 mL - 2/19/2025 12:05:00 PM      Post Assessment  Injection Assessment: negative aspiration for heme, no paresthesia on injection and incremental injection  Patient Tolerance:comfortable throughout block  Complications:no  Additional Notes  The block or continuous infusion is requested by the referring physician for management of postoperative pain, or pain  related to a procedure. Ultrasound guidance (deemed medically necessary). Painless injection, pt was awake and conversant during the procedure without complications. Needle and surrounding structures visualized throughout procedure. No adverse reactions or complications seen during this period. Post-procedure image showed no signs of complication, and anatomy was consistent with an uncomplicated nerve blockade.  Performed by: Adrian Fragoso MD

## 2025-02-20 VITALS
RESPIRATION RATE: 16 BRPM | SYSTOLIC BLOOD PRESSURE: 130 MMHG | TEMPERATURE: 98.2 F | OXYGEN SATURATION: 92 % | WEIGHT: 181.22 LBS | HEART RATE: 76 BPM | DIASTOLIC BLOOD PRESSURE: 56 MMHG | BODY MASS INDEX: 30.19 KG/M2 | HEIGHT: 65 IN

## 2025-02-20 LAB
ANION GAP SERPL CALCULATED.3IONS-SCNC: 9.8 MMOL/L (ref 5–15)
BASOPHILS # BLD AUTO: 0.03 10*3/MM3 (ref 0–0.2)
BASOPHILS NFR BLD AUTO: 0.3 % (ref 0–1.5)
BUN SERPL-MCNC: 14 MG/DL (ref 8–23)
BUN/CREAT SERPL: 14.7 (ref 7–25)
CALCIUM SPEC-SCNC: 8.7 MG/DL (ref 8.6–10.5)
CHLORIDE SERPL-SCNC: 107 MMOL/L (ref 98–107)
CO2 SERPL-SCNC: 22.2 MMOL/L (ref 22–29)
CREAT SERPL-MCNC: 0.95 MG/DL (ref 0.57–1)
DEPRECATED RDW RBC AUTO: 43.8 FL (ref 37–54)
EGFRCR SERPLBLD CKD-EPI 2021: 66.2 ML/MIN/1.73
EOSINOPHIL # BLD AUTO: 0 10*3/MM3 (ref 0–0.4)
EOSINOPHIL NFR BLD AUTO: 0 % (ref 0.3–6.2)
ERYTHROCYTE [DISTWIDTH] IN BLOOD BY AUTOMATED COUNT: 12.7 % (ref 12.3–15.4)
GLUCOSE SERPL-MCNC: 124 MG/DL (ref 65–99)
HCT VFR BLD AUTO: 37.7 % (ref 34–46.6)
HGB BLD-MCNC: 13.1 G/DL (ref 12–15.9)
IMM GRANULOCYTES # BLD AUTO: 0.06 10*3/MM3 (ref 0–0.05)
IMM GRANULOCYTES NFR BLD AUTO: 0.5 % (ref 0–0.5)
LYMPHOCYTES # BLD AUTO: 0.46 10*3/MM3 (ref 0.7–3.1)
LYMPHOCYTES NFR BLD AUTO: 3.9 % (ref 19.6–45.3)
MCH RBC QN AUTO: 32.9 PG (ref 26.6–33)
MCHC RBC AUTO-ENTMCNC: 34.7 G/DL (ref 31.5–35.7)
MCV RBC AUTO: 94.7 FL (ref 79–97)
MONOCYTES # BLD AUTO: 0.82 10*3/MM3 (ref 0.1–0.9)
MONOCYTES NFR BLD AUTO: 6.9 % (ref 5–12)
NEUTROPHILS NFR BLD AUTO: 10.43 10*3/MM3 (ref 1.7–7)
NEUTROPHILS NFR BLD AUTO: 88.4 % (ref 42.7–76)
NRBC BLD AUTO-RTO: 0 /100 WBC (ref 0–0.2)
PLATELET # BLD AUTO: 182 10*3/MM3 (ref 140–450)
PMV BLD AUTO: 9.2 FL (ref 6–12)
POTASSIUM SERPL-SCNC: 4.3 MMOL/L (ref 3.5–5.2)
RBC # BLD AUTO: 3.98 10*6/MM3 (ref 3.77–5.28)
SODIUM SERPL-SCNC: 139 MMOL/L (ref 136–145)
WBC NRBC COR # BLD AUTO: 11.8 10*3/MM3 (ref 3.4–10.8)

## 2025-02-20 PROCEDURE — 97161 PT EVAL LOW COMPLEX 20 MIN: CPT

## 2025-02-20 PROCEDURE — 25010000002 CEFAZOLIN PER 500 MG: Performed by: ORTHOPAEDIC SURGERY

## 2025-02-20 PROCEDURE — 97166 OT EVAL MOD COMPLEX 45 MIN: CPT

## 2025-02-20 PROCEDURE — 80048 BASIC METABOLIC PNL TOTAL CA: CPT | Performed by: ORTHOPAEDIC SURGERY

## 2025-02-20 PROCEDURE — 97535 SELF CARE MNGMENT TRAINING: CPT

## 2025-02-20 PROCEDURE — 97110 THERAPEUTIC EXERCISES: CPT

## 2025-02-20 PROCEDURE — 99213 OFFICE O/P EST LOW 20 MIN: CPT | Performed by: INTERNAL MEDICINE

## 2025-02-20 PROCEDURE — 85025 COMPLETE CBC W/AUTO DIFF WBC: CPT | Performed by: STUDENT IN AN ORGANIZED HEALTH CARE EDUCATION/TRAINING PROGRAM

## 2025-02-20 RX ORDER — HYDROCODONE BITARTRATE AND ACETAMINOPHEN 7.5; 325 MG/1; MG/1
1-2 TABLET ORAL EVERY 4 HOURS PRN
Qty: 40 TABLET | Refills: 0 | Status: SHIPPED | OUTPATIENT
Start: 2025-02-20

## 2025-02-20 RX ADMIN — ASPIRIN 81 MG: 81 TABLET, COATED ORAL at 08:39

## 2025-02-20 RX ADMIN — SPIRONOLACTONE 25 MG: 25 TABLET ORAL at 08:39

## 2025-02-20 RX ADMIN — VORTIOXETINE 20 MG: 10 TABLET, FILM COATED ORAL at 08:39

## 2025-02-20 RX ADMIN — SODIUM CHLORIDE 2 G: 9 INJECTION, SOLUTION INTRAVENOUS at 04:15

## 2025-02-20 RX ADMIN — FAMOTIDINE 40 MG: 20 TABLET, FILM COATED ORAL at 08:39

## 2025-02-20 RX ADMIN — CARVEDILOL 6.25 MG: 6.25 TABLET, FILM COATED ORAL at 08:39

## 2025-02-20 RX ADMIN — HYDROCODONE BITARTRATE AND ACETAMINOPHEN 2 TABLET: 7.5; 325 TABLET ORAL at 08:39

## 2025-02-20 RX ADMIN — LOSARTAN POTASSIUM 25 MG: 25 TABLET, FILM COATED ORAL at 08:39

## 2025-02-20 RX ADMIN — APIXABAN 2.5 MG: 2.5 TABLET, FILM COATED ORAL at 08:39

## 2025-02-20 RX ADMIN — HYDROCODONE BITARTRATE AND ACETAMINOPHEN 2 TABLET: 7.5; 325 TABLET ORAL at 12:40

## 2025-02-20 RX ADMIN — FERROUS SULFATE TAB 325 MG (65 MG ELEMENTAL FE) 325 MG: 325 (65 FE) TAB at 08:39

## 2025-02-20 RX ADMIN — Medication 3 ML: at 08:40

## 2025-02-20 NOTE — SIGNIFICANT NOTE
02/20/25 0801   Plan   Final Discharge Disposition Code 01 - home or self-care   Final Note PTA [place]. Appt: 02/25/25 at 10AM.

## 2025-02-20 NOTE — PROGRESS NOTES
[facility]   Hospitalist Progress Note  Date: 2025  Patient Name: Carley Deng  : 1960  MRN: [id number]  Date of admission: 2025  Room/Bed: 235/1      Subjective   Subjective     Chief Complaint: Hospitalist consulted for management of hypertension after a right shoulder replacement    Summary:Carley Deng is a 64 y.o. female female with history of coronary artery disease and hypertension who presented for an elective right shoulder replacement.  Patient has been having right shoulder rotator cuff tear since November.  Patient underwent right total shoulder yesterday.  Patient tolerated the procedure well.  Hospital service was consulted for management of patient's hypertension    Interval Followup:   Patient is doing well today.  Pain is minimal.  Patient's blood pressure is well-controlled  Vitals stable       Review of Systems    All systems reviewed and negative except for what is outlined above.      Objective   Objective     Vitals:   Temp:  [97.2 °F (36.2 °C)-98.1 °F (36.7 °C)] 97.5 °F (36.4 °C)  Heart Rate:  [58-82] 77  Resp:  [14-18] 16  BP: (106-139)/(45-75) 120/66  Flow (L/min) (Oxygen Therapy):  [2-3] 2    Physical Exam   General: Awake, alert, NAD.  Resting in bedside chair  HENT: NCAT, MMM  Eyes: pupils equal, no scleral icterus  Cardiovascular: RRR, no murmurs   Pulmonary: CTA bilaterally; no wheezes; no conversational dyspnea  Gastrointestinal: S/ND/NT, +BS  Musculoskeletal: No gross deformities. Right shoulder in sling   Skin: No jaundice, no rash on exposed skin appreciated  Neuro: CN II through XII grossly intact; speech clear; no tremor  Psych: Mood and affect appropriate  : No Tejeda catheter; no suprapubic tenderness    Result Review    Result Review:  I have personally reviewed these results:  [x]  Laboratory      Lab 25  0416 25  1623   WBC 11.80* 7.67   HEMOGLOBIN 13.1 13.6   HEMATOCRIT 37.7 39.4   PLATELETS 182 179   NEUTROS ABS 10.43*  --     IMMATURE GRANS (ABS) 0.06*  --    LYMPHS ABS 0.46*  --    MONOS ABS 0.82  --    EOS ABS 0.00  --    MCV 94.7 93.8         Lab 02/20/25  0416 02/19/25  1623   SODIUM 139 136   POTASSIUM 4.3 4.1   CHLORIDE 107 104   CO2 22.2 19.9*   ANION GAP 9.8 12.1   BUN 14 12   CREATININE 0.95 0.94   EGFR 66.2 67.1   GLUCOSE 124* 150*   CALCIUM 8.7 9.1                         Brief Urine Lab Results       None          [x]  Microbiology   Microbiology Results (last 10 days)       ** No results found for the last 240 hours. **          [x]  Radiology  XR Shoulder 1 View Right    Result Date: 2/19/2025  Impression: Expected postoperative changes from a single limited frontal projection right shoulder Electronically Signed: Logan Purcell MD  2/19/2025 3:08 PM EST  Workstation ID: [id number]   []  EKG/Telemetry   []  Cardiology/Vascular   []  Pathology  []  Old records  []  Other:    Assessment & Plan   Assessment / Plan     Assessment:  Hypertension  Right shoulder replacement    Plan:  Patient is on Cozaar, Aldactone and Coreg for her hypertension.  Patient's blood pressure has been running fine here.  Patient states she recently lost 60 pounds and is concerned she may not need all of her blood pressure medication.  Discussed with patient that she can take her blood pressure at home.  Patient does have a blood pressure monitoring device.  Discussed with patient that if her blood pressure is greater than 130/80 to take her blood pressure medication.  Initially would start with Coreg and then can add on additional medication as needed.  Discussed with patient that she needs to follow-up with her primary cardiologist to discuss cessation of some of her blood pressure medication after providing her cardiologist with her blood pressure reading.  At this time patient will be discharged home per orthopedics       Discussed with RN.    VTE Prophylaxis:  Pharmacologic & mechanical VTE prophylaxis orders are present.        CODE STATUS:         Electronically signed by Pedrito Escalona DO, 2/20/2025, 10:09 EST.

## 2025-02-20 NOTE — THERAPY EVALUATION
Patient Name: Carley Deng  : 1960    MRN: [id number]                              Today's Date: 2025       Admit Date: 2025    Visit Dx:     ICD-10-CM ICD-9-CM   1. Difficulty in walking  R26.2 719.7   2. 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 FOLLOWED BY DR BAEZ. DENIES CP/SOA. REPORTS IS ACTIVE    Depression     Diarrhea 04/15/2022    HAS OCC ISSUES.    Diverticulosis     Epigastric pain 04/15/2022    Added automatically from request for surgery [id number]    GERD (gastroesophageal reflux disease)     Hyperlipemia     Hypertension     Irritable bowel syndrome     Lactose intolerance     Limb swelling     DENIES ANY ISSUES    Lumbago     NSTEMI (non-ST elevated myocardial infarction)     2021 HAD STENT PLACED IN LAD    Seasonal allergies     Traumatic complete tear of right rotator cuff      Past Surgical History:   Procedure Laterality Date    CARDIAC CATHETERIZATION Left 2021    Procedure: Left Heart Cath with coronary angiography and possible right heart cath. Possible PTCA/stent Insertion.;  Surgeon: Dc Mckeon MD;  Location: [facility]  CATH INVASIVE LOCATION;  Service: Cardiovascular;  Laterality: Left;     SECTION      X1    CHOLECYSTECTOMY      COLONOSCOPY      COLONOSCOPY N/A 2022    Procedure: COLONOSCOPY WITH BIOPSIES;  Surgeon: Willow Paniagua MD;  Location: [facility] ENDOSCOPY;  Service: Gastroenterology;  Laterality: N/A;  HEMORRHOIDS  DIVERTICULOSIS    CORONARY STENT PLACEMENT      ENDOSCOPY      ENDOSCOPY N/A 2022    Procedure: ESOPHAGOGASTRODUODENOSCOPY WITH BIOPSIES;  Surgeon: Willow Paniagua MD;  Location: [facility] ENDOSCOPY;  Service: Gastroenterology;  Laterality: N/A;  GASTRITIS    ENDOSCOPY N/A 2023    Procedure: ESOPHAGOGASTRODUODENOSCOPY with biopsy;  Surgeon: Willow Paniagua MD;  Location: [facility] ENDOSCOPY;  Service: Gastroenterology;  Laterality: N/A;  gastric polyps    HYSTERECTOMY      TOTAL SHOULDER ARTHROPLASTY W/ DISTAL CLAVICLE EXCISION Right 2025    Procedure: RIGHT TOTAL SHOULDER REVERSE ARTHROPLASTY;  Surgeon: Gaurang Downing MD;  Location: [facility] MAIN OR;  Service: Orthopedics;  Laterality: Right;    TUBAL ABDOMINAL LIGATION      UPPER GASTROINTESTINAL ENDOSCOPY        General Information       Row Name 25 1608 25 1558       OT Time and Intention    Document Type therapy note (daily note)  -SC evaluation  -SC    Mode of Treatment individual therapy;occupational therapy  -SC individual therapy;occupational therapy  -SC    Patient Effort good  -SC good  -SC      Row Name 25 1608 25 3078       General Information    Patient Profile Reviewed yes  -SC yes  -SC    Prior Level of Function -- independent:  PLOF is independence in the community. Patient has a walk in shower w/ seat and did not previously require DME.  -SC    Existing Precautions/Restrictions -- --  Right TSR precautions; sling immobilizer, NWB right UE  -SC    Barriers to Rehab -- none identified  -SC      Row Name 25 9094          Occupational Profile     "Reason for Services/Referral (Occupational Profile) Patient is a 64 year-old female admitted to [facility] on 2/19/2025 with a right rotator cuff tear.  She is now postop total shoulder replacement.  OT consulted due to decline in functional mobility.  No previous OT services for current condition.  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  -SC     Patient Goals (Occupational Profile) Patient reports that she wants to beable to use her right arm \"normally\" again.  -SC       Row Name 02/20/25 1550          Living Environment    People in Home spouse  -SC       Row Name 02/20/25 1555          Home Main Entrance    Number of Stairs, Main Entrance one  -SC     Stair Railings, Main Entrance none  -SC       Row Name 02/20/25 1555          Stairs Within Home, Primary    Number of Stairs, Within Home, Primary none  -SC     Stair Railings, Within Home, Primary none  -SC       Row Name 02/20/25 1608 02/20/25 6595       Cognition    Orientation Status (Cognition) --  Patient pleasant, cooperative and highly motivated to return to [facility].  -SC oriented x 4  -SC      Row Name 02/20/25 1634          Safety Issues/Impairments Affecting Functional Mobility    Impairments Affecting Function (Mobility) endurance/activity tolerance;range of motion (ROM);grasp;strength;pain  -SC               User Key  (r) = Recorded By, (t) = Taken By, (c) = Cosigned By      Initials Name Provider Type    SC Graciela Rae OT Occupational Therapist                     Mobility/ADL's       Row Name 02/20/25 4989          Bed Mobility    Comment, (Bed Mobility) Patient up in chair upon clinician arrival. Educated patient on use of pillow for support of right arm when positionined in supine.  -SC       Row Name 02/20/25 1552          Transfers    Transfers sit-stand transfer;stand-sit transfer;toilet transfer  -SC       Row Name 02/20/25 1608 02/20/25 3658       " Sit-Stand Transfer    Sit-Stand [place] (Transfers) standby assist  -SC standby assist  -SC    Comment, (Sit-Stand Transfer) STS x 7  -SC --      Row Name 02/20/25 1608 02/20/25 1559       Stand-Sit Transfer    Stand-Sit [place] (Transfers) standby assist  -SC standby assist  -SC      Row Name 02/20/25 1608 02/20/25 1559       Toilet Transfer    [place] Level (Toilet Transfer) -- standby assist  -SC    Assistive Device (Toilet Transfer) -- commode  -SC    Comment, (Toilet Transfer) Recommended use of 3:1 over toilet if toilet low or difficult to stand from at home.  -SC --      [facility] Name 02/20/25 1608          Functional Mobility    Functional Mobility- Comment Engaged patient in functional mobility within room to simulate household mobility. Patient demonstrated SBA and no issues with balance.  -[facility] Name 02/20/25 1608 02/20/25 1559       Activities of Daily Living    BADL Assessment/Intervention bathing;upper body dressing;lower body dressing;grooming;toileting  -SC bathing;upper body dressing;lower body dressing;grooming;feeding;toileting  -SC      [facility] Name 02/20/25 1608 02/20/25 1559       Bathing Assessment/Intervention    [place] Level (Bathing) minimum assist (75% patient effort)  -SC minimum assist (75% patient effort)  -SC    Comment, (Bathing) Instructed patient in modified techniques for increaed independence wtih adherence to precautions. Provided demonstration with shower chair in room. Patient able to return demo with good understanding for sponge bath. Recommended shower chair for a seated shower position for increased ease and safety.  -SC --      [facility] Name 02/20/25 1608 02/20/25 1559       Upper Body Dressing Assessment/Training    [place] Level (Upper Body Dressing) minimum assist (75% patient effort)  -SC minimum assist (75% patient effort)  -SC    Comment, (Upper Body Dressing) Instructed patient in modified techniques for increaed independence wtih adherence to  precautions for UB dressing. After demonstration, patient able to return demo with good understanding at min A and max vc for donning undergarment, pull over shirt, and button up shirt. Also instructed patient on how to gabriel/doff sling with good adherence to precautions.  -SC --      Row Name 02/20/25 1559          Lower Body Dressing Assessment/Training    [place] Level (Lower Body Dressing) minimum assist (75% patient effort)  -SC       Row Name 02/20/25 1559          Grooming Assessment/Training    [place] Level (Grooming) standby assist  -SC       Row Name 02/20/25 1559          Self-Feeding Assessment/Training    [place] Level (Feeding) set up  -SC       Row Name 02/20/25 1559          Toileting Assessment/Training    [place] Level (Toileting) standby assist  -SC               User Key  (r) = Recorded By, (t) = Taken By, (c) = Cosigned By      Initials Name Provider Type    SC Graciela Rae OT Occupational Therapist                   Obj/Interventions       Row Name 02/20/25 1601          Sensory Assessment (Somatosensory)    Sensory Assessment (Somatosensory) sensation intact  -[facility] Name 02/20/25 1601          Vision Assessment/Intervention    Visual Impairment/Limitations WFL  -SC       Row Name 02/20/25 1612 02/20/25 1601       Range of Motion Comprehensive    General Range of Motion -- upper extremity range of motion deficits identified  -SC    Comment, General Range of Motion Educated patient on HEP for Right UE with adherence to precautions. After instruction of gabriel/doff of sling, instructed patient in pendulum exercise 3-4 times daily and active rom of right elbow, wrist/hand. Stressed no AROM of right shoulder and use of immobilizer sling at all times until physician f/u. Patient verbalized understanding and returned demo of Pendulum and right active elbow, wrist, and hand with good adherence to all precautions.  -SC Right UE immobilized due to surgery  -SC      Row Name  02/20/25 1601          Strength Comprehensive (MMT)    Comment, General Manual Muscle Testing (MMT) Assessment Left UE WFL; Right UE N/T  -SC       Row Name 02/20/25 1601          Motor Skills    Motor Skills coordination;functional endurance  -SC     Coordination right;fine motor deficit;minimal impairment  -SC     Functional Endurance fair  -SC       Row Name 02/20/25 1601          Balance    Balance Assessment standing dynamic balance;standing static balance  -SC     Static Standing Balance standby assist  -SC     Dynamic Standing Balance contact guard  -SC               User Key  (r) = Recorded By, (t) = Taken By, (c) = Cosigned By      Initials Name Provider Type    SC Graciela Rae, OT Occupational Therapist                   Goals/Plan       Row Name 02/20/25 1603          Bathing Goal 1 (OT)    Activity/Device (Bathing Goal 1, OT) bathing skills, all  -SC     [place] Level/Cues Needed (Bathing Goal 1, OT) modified independence  -SC     Time Frame (Bathing Goal 1, OT) long term goal (LTG);10 days  -SC       Row Name 02/20/25 1603          Dressing Goal 1 (OT)    Activity/Device (Dressing Goal 1, OT) dressing skills, all  -SC     [place]/Cues Needed (Dressing Goal 1, OT) modified independence  -SC     Time Frame (Dressing Goal 1, OT) long term goal (LTG);10 days  -SC       Row Name 02/20/25 1603          Toileting Goal 1 (OT)    Activity/Device (Toileting Goal 1, OT) toileting skills, all  -SC     [place] Level/Cues Needed (Toileting Goal 1, OT) modified independence  -SC     Time Frame (Toileting Goal 1, OT) long term goal (LTG);10 days  -SC       Row Name 02/20/25 1603          ROM Goal 1 (OT)    ROM Goal 1 (OT) Patient will demonstrate HEP with correct adherence to precuations and verbalize freqency  per TSR protocol.  -SC     Time Frame (ROM Goal 1, OT) long term goal (LTG);10 days  -SC       Row Name 02/20/25 1603          Therapy Assessment/Plan (OT)    Planned Therapy Interventions (OT)  activity tolerance training;BADL retraining;occupation/activity based interventions;patient/caregiver education/training;adaptive equipment training  -SC               User Key  (r) = Recorded By, (t) = Taken By, (c) = Cosigned By      Initials Name Provider Type    SC Graciela Rae OT Occupational Therapist                   Clinical Impression       [facility] Name 02/20/25 1602          Pain Assessment    Pretreatment Pain Rating 7/10  -SC     Posttreatment Pain Rating 7/10  -SC     Pain Side/Orientation right  -SC     Pre/Posttreatment Pain Comment Nursing aware of patient pain and provided medication at start of therapy session.  -[facility] Name 02/20/25 1602          Plan of Care Review    Plan of Care Reviewed With patient  -SC     Progress no change  Evaluation complete  -SC     Outcome Evaluation Patient presents with limitations of pain right upper extremity range of motion and coordination which impede her ability to perform ADLs/transfers as prior.  The skills of a therapist will be required to safely and effectively implement treatment plan to restore maximum level function.  -[facility] 02/20/25 1602          Therapy Assessment/Plan (OT)    Rehab Potential (OT) good  -SC     Criteria for Skilled Therapeutic Interventions Met (OT) yes;meets criteria;skilled treatment is necessary  -SC     Therapy Frequency (OT) 5 times/wk  -[facility] Name 02/20/25 1602          Therapy Plan Review/Discharge Plan (OT)    Anticipated Discharge Disposition (OT) home with outpatient therapy services  -SC       Row Name 02/20/25 1602          Positioning and Restraints    Pre-Treatment Position sitting in chair/recliner  -SC     Post Treatment Position chair  -SC     In Chair encouraged to call for assist;exit alarm on;call light within reach  -SC               User Key  (r) = Recorded By, (t) = Taken By, (c) = Cosigned By      Initials Name Provider Type    Graciela Topete OT Occupational Therapist                    Outcome Measures       Row Name 02/20/25 1605          How much help from another is currently needed...    Putting on and taking off regular lower body clothing? 3  -SC     Bathing (including washing, rinsing, and drying) 3  -SC     Toileting (which includes using toilet bed pan or urinal) 4  -SC     Putting on and taking off regular upper body clothing 3  -SC     Taking care of personal grooming (such as brushing teeth) 4  -SC     Eating meals 4  -SC     AM-PAC 6 Clicks Score (OT) 21  -SC       Row Name 02/20/25 1000 02/20/25 0839       How much help from another person do you currently need...    Turning from your back to your side while in flat bed without using bedrails? 4  -SHERYL (r) TM (t) SHERYL (c) 4  -KK    Moving from lying on back to sitting on the side of a flat bed without bedrails? 4  -SHERYL (r) TM (t) SHERYL (c) 4  -KK    Moving to and from a bed to a chair (including a wheelchair)? 4  -SHERYL (r) TM (t) SHERYL (c) 4  -KK    Standing up from a chair using your arms (e.g., wheelchair, bedside chair)? 4  -SHERYL (r) TM (t) SHERYL (c) 4  -KK    Climbing 3-5 steps with a railing? 4  -SHERYL (r) TM (t) SHERYL (c) 4  -KK    To walk in hospital room? 4  -SHERYL (r) TM (t) SHERYL (c) 4  -KK    AM-PAC 6 Clicks Score (PT) 24  -SHERYL (r) TM (t) 24  -KK    Highest Level of Mobility Goal 8 --> Walked 250 feet or more  -SHERYL (r) TM (t) 8 --> Walked 250 feet or more  -KK      Row Name 02/20/25 1605 02/20/25 1000       Functional Assessment    Outcome Measure Options AM-PAC 6 Clicks Daily Activity (OT);Optimal Instrument  -SC AM-PAC 6 Clicks Basic Mobility (PT)  -SHERYL (r) TM (t) SHERYL (c)      Row Name 02/20/25 1605          Optimal Instrument    Optimal Instrument Optimal - 3  -SC     Bending/Stooping 3  -SC     Standing 1  -SC     Reaching 4  -SC     From the list, choose the 3 activities you would most like to be able to do without any difficulty Standing;Reaching;Bending/stooping  -SC     Total Score Optimal - 3 8  -SC               User Key  (r) = Recorded By,  (t) = Taken By, (c) = Cosigned By      Initials Name Provider Type    Roosevelt Gardner, PT Physical Therapist    Ruma Denson, RN Registered Nurse    Graciela Topete, OT Occupational Therapist    TM Carson Cam, PT Student PT Student                    Occupational Therapy Education       Title: PT OT SLP Therapies (Done)       Topic: Occupational Therapy (Done)       Point: ADL training (Done)       Description:   Instruct learner(s) on proper safety adaptation and remediation techniques during self care or transfers.   Instruct in proper use of assistive devices.                  Learning Progress Summary            Patient Acceptance, E, VU by SC at 2/20/2025 1605    Comment: Need for staff assistance for all ADL's/Transfers  Safe transfer techniques  Safe positioning for ADL's  Adaptive Techniques for Lower Body ADL's  ADL Home Safety/Adaptive Equipment Recommendations                      Point: Home exercise program (Done)       Description:   Instruct learner(s) on appropriate technique for monitoring, assisting and/or progressing therapeutic exercises/activities.                  Learning Progress Summary            Patient Acceptance, E, VU by SC at 2/20/2025 1605    Comment: Need for staff assistance for all ADL's/Transfers  Safe transfer techniques  Safe positioning for ADL's  Adaptive Techniques for Lower Body ADL's  ADL Home Safety/Adaptive Equipment Recommendations                      Point: Precautions (Done)       Description:   Instruct learner(s) on prescribed precautions during self-care and functional transfers.                  Learning Progress Summary            Patient Acceptance, E, VU by SC at 2/20/2025 1605    Comment: Need for staff assistance for all ADL's/Transfers  Safe transfer techniques  Safe positioning for ADL's  Adaptive Techniques for Lower Body ADL's  ADL Home Safety/Adaptive Equipment Recommendations                      Point: Body mechanics (Done)        Description:   Instruct learner(s) on proper positioning and spine alignment during self-care, functional mobility activities and/or exercises.                  Learning Progress Summary            Patient Acceptance, E, VU by SC at 2/20/2025 0594    Comment: Need for staff assistance for all ADL's/Transfers  Safe transfer techniques  Safe positioning for ADL's  Adaptive Techniques for Lower Body ADL's  ADL Home Safety/Adaptive Equipment Recommendations                                      User Key       Initials Effective Dates Name Provider Type Discipline    SC 02/05/24 -  Graciela Rae OT Occupational Therapist OT                  OT Recommendation and Plan  Planned Therapy Interventions (OT): activity tolerance training, BADL retraining, occupation/activity based interventions, patient/caregiver education/training, adaptive equipment training  Therapy Frequency (OT): 5 times/wk  Plan of Care Review  Plan of Care Reviewed With: patient  Progress: no change (Evaluation complete)  Outcome Evaluation: Patient presents with limitations of pain right upper extremity range of motion and coordination which impede her ability to perform ADLs/transfers as prior.  The skills of a therapist will be required to safely and effectively implement treatment plan to restore maximum level function.     Time Calculation:   Evaluation Complexity (OT)  Review Occupational Profile/Medical/Therapy History Complexity: expanded/moderate complexity  Assessment, Occupational Performance/Identification of Deficit Complexity: 1-3 performance deficits  Clinical Decision Making Complexity (OT): problem focused assessment/low complexity  Overall Complexity of Evaluation (OT): low complexity     Time Calculation- OT       Row Name 02/20/25 6608             Time Calculation- OT    OT Received On 02/20/25  -SC      OT Goal Re-Cert Due Date 03/01/25  -SC         Timed Charges    51184 - OT Therapeutic Exercise Minutes 8  -SC      08012 - OT Self  Care/Mgmt Minutes 34  -SC         Untimed Charges    OT Eval/Re-eval Minutes 35  -SC         Total Minutes    Timed Charges Total Minutes 42  -SC      Untimed Charges Total Minutes 35  -SC       Total Minutes 77  -SC                User Key  (r) = Recorded By, (t) = Taken By, (c) = Cosigned By      Initials Name Provider Type    SC Graciela Rae OT Occupational Therapist                  Therapy Charges for Today       Code Description Service Date Service Provider Modifiers Qty    [id number]  OT SELF CARE/MGMT/TRAIN EA 15 MIN 2/20/2025 Graciela Rae OT GO 2    [id number]  OT THER PROC EA 15 MIN 2/20/2025 Graciela Rae OT GO 1    [id number]  OT EVAL MOD COMPLEXITY 3 2/20/2025 Graciela Rae OT GO 1                 Graciela Rae OT  2/20/2025

## 2025-02-20 NOTE — PLAN OF CARE
Goal Outcome Evaluation:  Plan of Care Reviewed With: (P) patient           Outcome Evaluation: (P) Pt has no difficulty with ambulation and shows no risk of falls. Pt recommended for outpatient therapy for post-op shoulder surgery    Anticipated Discharge Disposition (PT): (P) home with outpatient therapy services

## 2025-02-20 NOTE — THERAPY EVALUATION
Acute Care - Physical Therapy Initial Evaluation   [facility]     Patient Name: Carley Deng  : 1960  MRN: [id number]  Today's Date: 2025      Visit Dx:     ICD-10-CM ICD-9-CM   1. Difficulty in walking  R26.2 719.7   2. Traumatic complete tear of right rotator cuff, subsequent encounter  S46.011D V58.89     840.4     Patient Active Problem List   Diagnosis    Hyperglycemia    Hypertension    Hyperlipemia    Coronary artery disease involving native coronary artery of native heart    Irritable bowel syndrome with diarrhea    Low back pain    Guzmán's esophagus without dysplasia    Gastroesophageal reflux disease without esophagitis    Limb swelling    Seasonal allergic rhinitis    Traumatic complete tear of right rotator cuff    Arthritis of right shoulder region     Past Medical History:   Diagnosis Date    Abdominal bloating 04/15/2022    Added automatically from request for surgery [id number]    Anxiety     Guzmán esophagus     Benign essential tremor     Cholelithiasis     Had Gallbladder Removed    Coronary artery disease     HAD NSTEMI WITH STENT IN LAD PLACED 2021. FOLLOWED BY DR BAEZ. DENIES CP/SOA. REPORTS IS ACTIVE    Depression     Diarrhea 04/15/2022    HAS OCC ISSUES.    Diverticulosis     Epigastric pain 04/15/2022    Added automatically from request for surgery [id number]    GERD (gastroesophageal reflux disease)     Hyperlipemia     Hypertension     Irritable bowel syndrome     Lactose intolerance     Limb swelling     DENIES ANY ISSUES    Lumbago     NSTEMI (non-ST elevated myocardial infarction)     2021 HAD STENT PLACED IN LAD    Seasonal allergies     Traumatic complete tear of right rotator cuff      Past Surgical History:   Procedure Laterality Date    CARDIAC CATHETERIZATION Left 2021    Procedure: Left Heart Cath with coronary angiography and possible right heart cath. Possible PTCA/stent Insertion.;  Surgeon: Dc Mckeon MD;  Location:  [facility] CATH INVASIVE LOCATION;  Service: Cardiovascular;  Laterality: Left;     SECTION      X1    CHOLECYSTECTOMY      COLONOSCOPY      COLONOSCOPY N/A 2022    Procedure: COLONOSCOPY WITH BIOPSIES;  Surgeon: Willow Paniagua MD;  Location: [facility] ENDOSCOPY;  Service: Gastroenterology;  Laterality: N/A;  HEMORRHOIDS  DIVERTICULOSIS    CORONARY STENT PLACEMENT      ENDOSCOPY      ENDOSCOPY N/A 2022    Procedure: ESOPHAGOGASTRODUODENOSCOPY WITH BIOPSIES;  Surgeon: Willow Paniagua MD;  Location: [facility] ENDOSCOPY;  Service: Gastroenterology;  Laterality: N/A;  GASTRITIS    ENDOSCOPY N/A 2023    Procedure: ESOPHAGOGASTRODUODENOSCOPY with biopsy;  Surgeon: Willow Paniagua MD;  Location: [facility] ENDOSCOPY;  Service: Gastroenterology;  Laterality: N/A;  gastric polyps    HYSTERECTOMY      TUBAL ABDOMINAL LIGATION      UPPER GASTROINTESTINAL ENDOSCOPY       PT Assessment (Last 12 Hours)       PT Evaluation and Treatment       Row Name 25 1000          Physical Therapy Time and Intention    Subjective Information no complaints (P)   -TM     Document Type evaluation (P)   -TM     Mode of Treatment individual therapy (P)   -TM     Patient Effort good (P)   -TM     Symptoms Noted During/After Treatment none (P)   -TM       Row Name 25 1000          General Information    Prior Level of Function independent: (P)   -TM     Equipment Currently Used at Home none (P)   -TM     Existing Precautions/Restrictions no known precautions/restrictions (P)   -TM       Row Name 25 1000          Living Environment    Current Living Arrangements home (P)   -TM     Home Accessibility stairs to enter home (P)   -TM     People in Home spouse (P)   -TM     Primary Care Provided by self (P)   -TM       Row Name 25 1000          Home Main Entrance    Number of Stairs, Main Entrance five (P)   -TM     Stair Railings, Main Entrance railings safe and in good  condition (P)   -       Row Name 02/20/25 1000          Range of Motion (ROM)    Range of Motion ROM is WNL;bilateral lower extremities (P)   -       Row Name 02/20/25 1000          Strength (Manual Muscle Testing)    Strength (Manual Muscle Testing) strength is WNL;bilateral lower extremities (P)   -       Row Name 02/20/25 1000          Bed Mobility    Bed Mobility bed mobility (all) activities (P)   -     All Activities, [place] (Bed Mobility) independent (P)   -       Row Name 02/20/25 1000          Transfers    Transfers sit-stand transfer (P)   -       Row Name 02/20/25 1000          Sit-Stand Transfer    Sit-Stand [place] (Transfers) independent (P)   -       Row Name 02/20/25 1000          Gait/Stairs (Locomotion)    Gait/Stairs Locomotion gait/ambulation independence (P)   -TM     [place] Level (Gait) independent (P)   -TM     Patient was able to Ambulate yes (P)   -TM     Distance in Feet (Gait) 250 (P)   -TM     Negotiation (Stairs) stairs independence (P)   -TM     [place] Level (Stairs) independent (P)   -TM     Number of Steps (Stairs) 3 (P)   -TM     Ascending Technique (Stairs) step-over-step (P)   -TM     Descending Technique (Stairs) step-over-step (P)   -       Row Name 02/20/25 1000          Balance    Balance Assessment standing dynamic balance (P)   -TM     Dynamic Standing Balance independent (P)   -TM     Position/Device Used, Standing Balance unsupported (P)   -       Row Name             Wound 02/19/25 1326 Right shoulder    Wound - Properties Group Placement Date: 02/19/25  -KM Placement Time: 1326  -KM Side: Right  -KM Location: shoulder  -KM Primary Wound Type: Incision  -KM    Retired Wound - Properties Group Placement Date: 02/19/25  -KM Placement Time: 1326  -KM Side: Right  -KM Location: shoulder  -KM Primary Wound Type: Incision  -KM    Retired Wound - Properties Group Placement Date: 02/19/25  -KM Placement Time: 1326  -KM Side: Right  -KM  Location: shoulder  -KM Primary Wound Type: Incision  -KM    Retired Wound - Properties Group Date first assessed: 02/19/25  -KM Time first assessed: 1326  -KM Side: Right  -KM Location: shoulder  -KM Primary Wound Type: Incision  -KM      Row Name 02/20/25 1000          Plan of Care Review    Plan of Care Reviewed With patient (P)   -TM     Outcome Evaluation Pt has no difficulty with ambulation and shows no risk of falls. Pt recommended for outpatient therapy for post-op shoulder surgery (P)   -TM       Row Name 02/20/25 1000          Therapy Assessment/Plan (PT)    Patient/Family Therapy Goals Statement (PT) return home (P)   -TM     Criteria for Skilled Interventions Met (PT) no problems identified which require skilled intervention (P)   -TM     Therapy Frequency (PT) evaluation only (P)   -TM       Row Name 02/20/25 1000          PT Evaluation Complexity    History, PT Evaluation Complexity no personal factors and/or comorbidities (P)   -TM     Examination of Body Systems (PT Eval Complexity) total of 4 or more elements (P)   -TM     Clinical Presentation (PT Evaluation Complexity) stable (P)   -TM     Clinical Decision Making (PT Evaluation Complexity) low complexity (P)   -TM     Overall Complexity (PT Evaluation Complexity) low complexity (P)   -TM       Row Name 02/20/25 1000          Therapy Plan Review/Discharge Plan (PT)    Therapy Plan Review (PT) evaluation/treatment results reviewed;patient (P)   -TM               User Key  (r) = Recorded By, (t) = Taken By, (c) = Cosigned By      Initials Name Provider Type     Bernadine Yi, RN Registered Nurse    Carson Lozoya, PT Student PT Student                    Physical Therapy Education       Title: PT OT SLP Therapies (Done)       Topic: Physical Therapy (Done)       Point: Mobility training (Done)       Learning Progress Summary            Patient Acceptance, E,TB, VU by TM at 2/20/2025 1054                      Point: Home exercise program  (Done)       Learning Progress Summary            Patient Acceptance, E,TB, VU by TM at 2/20/2025 1054                      Point: Body mechanics (Done)       Learning Progress Summary            Patient Acceptance, E,TB, VU by TM at 2/20/2025 1054                      Point: Precautions (Done)       Learning Progress Summary            Patient Acceptance, E,TB, VU by TM at 2/20/2025 1054                                      User Key       Initials Effective Dates Name Provider Type Discipline     02/04/25 -  Carson Cam, PT Student PT Student PT                  PT Recommendation and Plan  Anticipated Discharge Disposition (PT): (P) home with outpatient therapy services  Therapy Frequency (PT): (P) evaluation only  Plan of Care Reviewed With: (P) patient  Outcome Evaluation: (P) Pt has no difficulty with ambulation and shows no risk of falls. Pt recommended for outpatient therapy for post-op shoulder surgery   Outcome Measures       Row Name 02/20/25 1000             How much help from another person do you currently need...    Turning from your back to your side while in flat bed without using bedrails? 4 (P)   -TM      Moving from lying on back to sitting on the side of a flat bed without bedrails? 4 (P)   -TM      Moving to and from a bed to a chair (including a wheelchair)? 4 (P)   -TM      Standing up from a chair using your arms (e.g., wheelchair, bedside chair)? 4 (P)   -TM      Climbing 3-5 steps with a railing? 4 (P)   -TM      To walk in hospital room? 4 (P)   -TM      AM-PAC 6 Clicks Score (PT) 24 (P)   -TM         Functional Assessment    Outcome Measure Options AM-PAC 6 Clicks Basic Mobility (PT) (P)   -TM                User Key  (r) = Recorded By, (t) = Taken By, (c) = Cosigned By      Initials Name Provider Type    TM Carson Cam, PT Student PT Student                     Time Calculation:    PT Charges       Row Name 02/20/25 1050             Time Calculation    PT Received On 02/20/25 (P)    -TM      PT Goal Re-Cert Due Date 03/01/25 (P)   -TM         Untimed Charges    PT Eval/Re-eval Minutes 25 (P)   -TM         Total Minutes    Untimed Charges Total Minutes 25 (P)   -TM       Total Minutes 25 (P)   -TM                User Key  (r) = Recorded By, (t) = Taken By, (c) = Cosigned By      Initials Name Provider Type    TM Carson Cam, PT Student PT Student                  Therapy Charges for Today       Code Description Service Date Service Provider Modifiers Qty    [id number] HC PT EVAL LOW COMPLEXITY 2 2/20/2025 Carson Cam, PT Student GP 1            PT G-Codes  Outcome Measure Options: (P) AM-PAC 6 Clicks Basic Mobility (PT)  AM-PAC 6 Clicks Score (PT): (P) 24    Carson Cam PT Student  2/20/2025

## 2025-02-20 NOTE — PLAN OF CARE
Goal Outcome Evaluation:  Plan of Care Reviewed With: patient        Progress: no change (Evaluation complete)  Outcome Evaluation: Patient presents with limitations of pain right upper extremity range of motion and coordination which impede her ability to perform ADLs/transfers as prior.  The skills of a therapist will be required to safely and effectively implement treatment plan to restore maximum level function.    Anticipated Discharge Disposition (OT): home with outpatient therapy services

## 2025-02-20 NOTE — PLAN OF CARE
Goal Outcome Evaluation:  Plan of Care Reviewed With: patient        Progress: improving  Outcome Evaluation: Pt did not complain of pain during the shift. VSS. Pt was able to ambulate 200 ft. Pt is awaiting discharge planning.

## 2025-02-20 NOTE — PROGRESS NOTES
[facility]     Progress Note    Patient Name: Carley Deng  : 1960  MRN: [id number]  Primary Care Physician:  Alex Cooper MD  Date of admission: 2025    Subjective   Subjective     HPI:  Patient Reports no new complaints.  Nerve block is wearing off.  Her pain is still controlled.  She denies any chest pain or shortness of air.    Review of Systems   See HPI    Objective   Objective     Vitals:   Temp:  [97.2 °F (36.2 °C)-98.1 °F (36.7 °C)] 98.1 °F (36.7 °C)  Heart Rate:  [58-82] 68  Resp:  [14-18] 16  BP: (106-139)/(45-75) 106/45  Flow (L/min) (Oxygen Therapy):  [2-3] 2  Physical Exam    General: Alert, no acute distress   Chest: Unlabored breathing, cardiovascular: Regular heart rate   Musculoskeletal: Dressing intact.  Immobilizer intact.  Wearing off, can wiggle fingers and thumb.  Sensation intact to light touch.    Result Review      WBC   Date Value Ref Range Status   2025 11.80 (H) 3.40 - 10.80 10*3/mm3 Final     RBC   Date Value Ref Range Status   2025 3.98 3.77 - 5.28 10*6/mm3 Final     Hemoglobin   Date Value Ref Range Status   2025 13.1 12.0 - 15.9 g/dL Final     Hematocrit   Date Value Ref Range Status   2025 37.7 34.0 - 46.6 % Final     MCV   Date Value Ref Range Status   2025 94.7 79.0 - 97.0 fL Final     MCH   Date Value Ref Range Status   2025 32.9 26.6 - 33.0 pg Final     MCHC   Date Value Ref Range Status   2025 34.7 31.5 - 35.7 g/dL Final     RDW   Date Value Ref Range Status   2025 12.7 12.3 - 15.4 % Final     RDW-SD   Date Value Ref Range Status   2025 43.8 37.0 - 54.0 fl Final     MPV   Date Value Ref Range Status   2025 9.2 6.0 - 12.0 fL Final     Platelets   Date Value Ref Range Status   2025 182 140 - 450 10*3/mm3 Final     Neutrophil %   Date Value Ref Range Status   2025 88.4 (H) 42.7 - 76.0 % Final     Lymphocyte %   Date Value Ref Range Status   2025 3.9 (L) 19.6 - 45.3 % Final      Monocyte %   Date Value Ref Range Status   02/20/2025 6.9 5.0 - 12.0 % Final     Eosinophil %   Date Value Ref Range Status   02/20/2025 0.0 (L) 0.3 - 6.2 % Final     Basophil %   Date Value Ref Range Status   02/20/2025 0.3 0.0 - 1.5 % Final     Immature Grans %   Date Value Ref Range Status   02/20/2025 0.5 0.0 - 0.5 % Final     Neutrophils, Absolute   Date Value Ref Range Status   02/20/2025 10.43 (H) 1.70 - 7.00 10*3/mm3 Final     Lymphocytes, Absolute   Date Value Ref Range Status   02/20/2025 0.46 (L) 0.70 - 3.10 10*3/mm3 Final     Monocytes, Absolute   Date Value Ref Range Status   02/20/2025 0.82 0.10 - 0.90 10*3/mm3 Final     Eosinophils, Absolute   Date Value Ref Range Status   02/20/2025 0.00 0.00 - 0.40 10*3/mm3 Final     Basophils, Absolute   Date Value Ref Range Status   02/20/2025 0.03 0.00 - 0.20 10*3/mm3 Final     Immature Grans, Absolute   Date Value Ref Range Status   02/20/2025 0.06 (H) 0.00 - 0.05 10*3/mm3 Final     nRBC   Date Value Ref Range Status   02/20/2025 0.0 0.0 - 0.2 /100 WBC Final        Result Review:  I have personally reviewed the results from the time of this admission to 2/20/2025 06:55 EST and agree with these findings:  [x]  Laboratory  []  Microbiology  [x]  Radiology  []  EKG/Telemetry   []  Cardiology/Vascular   []  Pathology  []  Old records  []  Other:      Assessment & Plan   Assessment / Plan     Brief Patient Summary:  Carley Deng is a 64 y.o. female who is status post  right total shoulder replacement    Active Hospital Problems:  Active Hospital Problems    Diagnosis     **Traumatic complete tear of right rotator cuff     Arthritis of right shoulder region      Plan: Nonweightbearing, shoulder immobilizer  Pain control  Physical and Occupational Therapy  DVT prophylaxis  Discharge planning: Home with therapy when medically able, likely later today       VTE Prophylaxis:  Pharmacologic & mechanical VTE prophylaxis orders are present.        CODE STATUS:       Disposition:  I expect patient to be discharged when medically able.    Electronically signed by Gaurang Downing MD, 02/20/25, 6:55 AM EST.

## 2025-02-21 ENCOUNTER — TELEPHONE (OUTPATIENT)
Dept: ORTHOPEDIC SURGERY | Facility: CLINIC | Age: 65
End: 2025-02-21
Payer: MEDICARE

## 2025-02-21 DIAGNOSIS — Z96.611 AFTERCARE FOLLOWING RIGHT SHOULDER JOINT REPLACEMENT SURGERY: Primary | ICD-10-CM

## 2025-02-21 DIAGNOSIS — Z47.1 AFTERCARE FOLLOWING RIGHT SHOULDER JOINT REPLACEMENT SURGERY: Primary | ICD-10-CM

## 2025-02-21 RX ORDER — ONDANSETRON 4 MG/1
8 TABLET, FILM COATED ORAL EVERY 8 HOURS PRN
Qty: 20 TABLET | Refills: 0 | Status: SHIPPED | OUTPATIENT
Start: 2025-02-21

## 2025-02-21 RX ORDER — OXYCODONE AND ACETAMINOPHEN 7.5; 325 MG/1; MG/1
1 TABLET ORAL EVERY 4 HOURS PRN
Qty: 30 TABLET | Refills: 0 | Status: SHIPPED | OUTPATIENT
Start: 2025-02-21

## 2025-02-21 RX ORDER — ASPIRIN 325 MG
325 TABLET, DELAYED RELEASE (ENTERIC COATED) ORAL DAILY
Qty: 14 TABLET | Refills: 0 | Status: SHIPPED | OUTPATIENT
Start: 2025-02-21

## 2025-02-21 NOTE — TELEPHONE ENCOUNTER
PATIENT STATES THE HYDROCODONE IS NOT HELPING HER PAIN AT ALL. SHE STATES SHE HAS BEEN ICING THE ARM AS WELL BUT CAN BARELY DO HER EXERCISES OR REST DUE TO HIGH PAIN LEVEL. SHE IS REQUESTING SOMETHING STRONGER.     SHE ALSO WANTED TO CHECK AND SEE IF SHE WAS SUPPOSED TO BE SENT HOME WITH ASA 325MG. SHE WAS ON ASA 81MG PRIOR TO SURGERY AND IS RESTARTING THAT TODAY.     [facility] 5

## 2025-03-04 ENCOUNTER — OFFICE VISIT (OUTPATIENT)
Dept: ORTHOPEDIC SURGERY | Facility: CLINIC | Age: 65
End: 2025-03-04
Payer: MEDICARE

## 2025-03-04 VITALS
OXYGEN SATURATION: 95 % | DIASTOLIC BLOOD PRESSURE: 94 MMHG | WEIGHT: 174 LBS | BODY MASS INDEX: 28.99 KG/M2 | SYSTOLIC BLOOD PRESSURE: 152 MMHG | HEART RATE: 76 BPM | HEIGHT: 65 IN

## 2025-03-04 DIAGNOSIS — M25.511 RIGHT SHOULDER PAIN, UNSPECIFIED CHRONICITY: ICD-10-CM

## 2025-03-04 DIAGNOSIS — Z47.89 AFTERCARE FOLLOWING SURGERY OF THE MUSCULOSKELETAL SYSTEM: Primary | ICD-10-CM

## 2025-03-04 PROCEDURE — 3077F SYST BP >= 140 MM HG: CPT | Performed by: PHYSICIAN ASSISTANT

## 2025-03-04 PROCEDURE — 99024 POSTOP FOLLOW-UP VISIT: CPT | Performed by: PHYSICIAN ASSISTANT

## 2025-03-04 PROCEDURE — 1159F MED LIST DOCD IN RCRD: CPT | Performed by: PHYSICIAN ASSISTANT

## 2025-03-04 PROCEDURE — 3080F DIAST BP >= 90 MM HG: CPT | Performed by: PHYSICIAN ASSISTANT

## 2025-03-04 PROCEDURE — 1160F RVW MEDS BY RX/DR IN RCRD: CPT | Performed by: PHYSICIAN ASSISTANT

## 2025-03-04 NOTE — PROGRESS NOTES
"Chief Complaint  Follow-up of the Right Shoulder and Suture / Staple Removal    Subjective          History of Present Illness      Carley Deng is a 64 y.o. female  presents to [facility] for     Patient presents for 2-week postoperative evaluation of right reverse total shoulder arthroplasty, 2/19/2025.  Patient does not tolerate and/or receive benefit from the pain medication therefore she is already stopped it.  She is mainly taking Tylenol.  She is attending therapy at [facility] on [address] she goes twice a week.  She states the incision has been healing well denies redness drainage or dehiscence.  She presents in her sling she has been compliant with sling use.  She does home exercises when not at therapy.      Allergies   Allergen Reactions    Egg-Derived Products Anaphylaxis    Erythromycin Hives     AS A CHILD         Social History     Socioeconomic History    Marital status:    Tobacco Use    Smoking status: Never     Passive exposure: Never    Smokeless tobacco: Never   Vaping Use    Vaping status: Never Used   Substance and Sexual Activity    Alcohol use: Never    Drug use: Never    Sexual activity: Defer        REVIEW OF SYSTEMS    Constitutional: Awake alert and oriented x3, no acute distress, denies fevers, chills, weight loss  Respiratory: No respiratory distress  Vascular: Brisk cap refill, Intact distal pulses, No cyanosis, compartments soft with no signs or symptoms of compartment syndrome or DVT.   Cardiovascular: Denies chest pain, shortness of breath  Skin: Denies rashes, acute skin changes  Neurologic: Denies headache, loss of consciousness  MSK: Right shoulder pain      Objective   Vital Signs:   /94   Pulse 76   Ht 165.1 cm (65\")   Wt 78.9 kg (174 lb)   SpO2 95%   BMI 28.96 kg/m²     Body mass index is 28.96 kg/m².    Physical Exam       Right shoulder: Incision is healing well, no erythema, no drainage or dehiscence, no signs of infection " range of motion appropriate/mildly limited secondary stiffness and pain, elbow wrist and hand range of motion intact/appropriate, neurovascularly intact      Procedures    Imaging Results (Most Recent)       Procedure Component Value Units Date/Time    XR Scapula Right [[id number]] Resulted: 03/04/25 1508     Updated: 03/04/25 1508    Narrative:      View:AP/Lateral view(s)  Site: Right shoulder  Indication: Right shoulder pain  Study: X-rays ordered, taken in the office, and reviewed today  X-ray: Intact appearing right reverse total shoulder arthroplasty, no   signs of hardware failure or loosening, no subsidence or periprosthetic   fracture, good alignment  Comparative data: Previous studies             Result Review :   The following data was reviewed by: HUMPHREY Vu on 03/04/2025:  Data reviewed : Radiologic studies reviewed by me with the patient              Assessment and Plan    Diagnoses and all orders for this visit:    1. Aftercare following surgery of RIGHT TOTAL SHOULDER REVERSE ARTHROPLASTY 2/19/25 (Primary)    2. Right shoulder pain, unspecified chronicity  -     XR Scapula Right        Reviewed x-rays with the patient discussed diagnosis and treatment options with her she was advised to continue therapy for strength and range of motion, continue Tylenol as needed for pain, continue sling use for another 2 weeks.  Sutures/staples removed in office today. Steri-strips applied. Discussed incision care/hygiene. May shower, but do not submerge in water until fully healed.  Advised patient that if any concerning symptoms regarding incision appearance occur that they should call us right away, patient expressed understanding.  Follow-up 4 weeks for recheck    Call or return if worsening symptoms.    Follow Up   Return in about 4 weeks (around 4/1/2025) for Recheck.  Patient was given instructions and counseling regarding her condition or for health maintenance advice. Please see specific  information pulled into the AVS if appropriate.       EMR Dragon/Transcription disclaimer:  Part of this note may be an electronic transcription/translation of spoken language to printed text using the Dragon Dictation System

## 2025-04-08 ENCOUNTER — OFFICE VISIT (OUTPATIENT)
Dept: ORTHOPEDIC SURGERY | Facility: CLINIC | Age: 65
End: 2025-04-08
Payer: MEDICARE

## 2025-04-08 VITALS
SYSTOLIC BLOOD PRESSURE: 120 MMHG | DIASTOLIC BLOOD PRESSURE: 77 MMHG | HEART RATE: 79 BPM | HEIGHT: 65 IN | BODY MASS INDEX: 28.99 KG/M2 | OXYGEN SATURATION: 97 % | WEIGHT: 174 LBS

## 2025-04-08 DIAGNOSIS — Z47.89 AFTERCARE FOLLOWING SURGERY OF THE MUSCULOSKELETAL SYSTEM: Primary | ICD-10-CM

## 2025-04-08 RX ORDER — CYCLOBENZAPRINE HCL 10 MG
10 TABLET ORAL 3 TIMES DAILY
Qty: 30 TABLET | Refills: 0 | Status: SHIPPED | OUTPATIENT
Start: 2025-04-08 | End: 2025-04-18

## 2025-04-08 NOTE — PROGRESS NOTES
"Chief Complaint  Follow-up of the Right Shoulder    Subjective          History of Present Illness      Carley Deng is a 64 y.o. female  presents to [facility] for     Patient presents for follow-up evaluation of right reverse total shoulder arthroplasty 2/19/2025.  Patient is still taking Tylenol mainly for pain.  She states she has pain at night she admits to pain that radiates down to her wrist from the lateral shoulder she also states she has pain in her shoulder blade.  She has missed some therapy for the last week due to vacation.  Patient is attending therapy at [facility] on [address].  She states the incision has healed well.      Allergies   Allergen Reactions    Egg-Derived Products Anaphylaxis    Erythromycin Hives     AS A CHILD         Social History     Socioeconomic History    Marital status:    Tobacco Use    Smoking status: Never     Passive exposure: Never    Smokeless tobacco: Never   Vaping Use    Vaping status: Never Used   Substance and Sexual Activity    Alcohol use: Never    Drug use: Never    Sexual activity: Defer        REVIEW OF SYSTEMS    Constitutional: Awake alert and oriented x3, no acute distress, denies fevers, chills, weight loss  Respiratory: No respiratory distress  Vascular: Brisk cap refill, Intact distal pulses, No cyanosis, compartments soft with no signs or symptoms of compartment syndrome or DVT.   Cardiovascular: Denies chest pain, shortness of breath  Skin: Denies rashes, acute skin changes  Neurologic: Denies headache, loss of consciousness  MSK: Right shoulder pain      Objective   Vital Signs:   /77   Pulse 79   Ht 165.1 cm (65\")   Wt 78.9 kg (174 lb)   SpO2 97%   BMI 28.96 kg/m²     Body mass index is 28.96 kg/m².    Physical Exam       Right shoulder: Incision is well-healed, no erythema, no drainage or dehiscence, no signs of infection active forward elevation 95 active abduction 75 external rotation with abduction " 70, internal rotation to her side elbow wrist hand range of motion intact/appropriate, neurovascularly intact      Procedures    Imaging Results (Most Recent)       None                   Assessment and Plan    Diagnoses and all orders for this visit:    1. Aftercare following surgery of RIGHT TOTAL SHOULDER REVERSE ARTHROPLASTY 2/19/25 (Primary)  -     Ambulatory Referral to Physical Therapy for Evaluation & Treatment    Other orders  -     cyclobenzaprine (FLEXERIL) 10 MG tablet; Take 1 tablet by mouth 3 times a day for 10 days.  Dispense: 30 tablet; Refill: 0        Discussed diagnosis and treatment options with the patient she was given a prescription for muscle relaxer, take as needed, continue therapy for strength and range of motion, new order was given follow-up in 6 weeks for recheck with x-rays    Call or return if worsening symptoms.    Follow Up   Return in about 6 weeks (around 5/20/2025) for Recheck.  Patient was given instructions and counseling regarding her condition or for health maintenance advice. Please see specific information pulled into the AVS if appropriate.       EMR Dragon/Transcription disclaimer:  Part of this note may be an electronic transcription/translation of spoken language to printed text using the Dragon Dictation System

## 2025-05-02 ENCOUNTER — OFFICE VISIT (OUTPATIENT)
Dept: CARDIOLOGY | Facility: CLINIC | Age: 65
End: 2025-05-02
Payer: MEDICARE

## 2025-05-02 VITALS
BODY MASS INDEX: 29.66 KG/M2 | DIASTOLIC BLOOD PRESSURE: 71 MMHG | SYSTOLIC BLOOD PRESSURE: 150 MMHG | HEIGHT: 65 IN | WEIGHT: 178 LBS | HEART RATE: 71 BPM

## 2025-05-02 DIAGNOSIS — I25.10 CORONARY ARTERY DISEASE INVOLVING NATIVE CORONARY ARTERY OF NATIVE HEART WITHOUT ANGINA PECTORIS: Primary | ICD-10-CM

## 2025-05-02 DIAGNOSIS — E78.2 MIXED HYPERLIPIDEMIA: ICD-10-CM

## 2025-05-02 DIAGNOSIS — I10 PRIMARY HYPERTENSION: ICD-10-CM

## 2025-05-02 RX ORDER — HYDROXYZINE PAMOATE 50 MG/1
50 CAPSULE ORAL 3 TIMES DAILY PRN
COMMUNITY
Start: 2025-04-22

## 2025-05-02 NOTE — ASSESSMENT & PLAN NOTE
she had recent labs with PCP, and her levels were not well-controlled, but she had not been taking rosuvastatin regularly, she has since resumed.  She plans to have her labs rechecked with PCP in the next few weeks, will follow results, and adjust if  indicated.

## 2025-05-02 NOTE — PROGRESS NOTES
Chief Complaint  Follow-up and Coronary Artery Disease    Subjective        History of Present Illness  Carley Deng presents to [facility] CARDIOLOGY   Ms. Deng is a 64-year-old female coming in today for routine cardiac follow-up for coronary artery disease and hyperlipidemia.  She has no complaints today of chest pains, shortness of breath, palpitations lightheadedness or dizziness.  Blood pressure is mildly elevated, but generally better controlled, but she admits to being under increased stress recently.  Compliant with medications.    Past History:     Coronary artery disease : Index presentation is non-STEMI on 2021, s/p angioplasty and stent placement to mid LAD artery.  RCA had a 40 to 50% proximal long stenosis.     Essential hypertension  Mixed hyperlipidemia  Irritable bowel syndrome    Past Medical History:   Diagnosis Date    Abdominal bloating 04/15/2022    Anxiety     [place] esophagus     Benign essential tremor     Cholelithiasis 1984    Coronary artery disease     Depression     Diarrhea 04/15/2022    Diverticulosis     Epigastric pain 04/15/2022    GERD (gastroesophageal reflux disease)     Hyperlipemia     Hypertension     Irritable bowel syndrome     Lactose intolerance     Limb swelling     Lumbago     NSTEMI (non-ST elevated myocardial infarction)     Seasonal allergies     Traumatic complete tear of right rotator cuff        Allergies   Allergen Reactions    Egg-Derived Products Anaphylaxis    Erythromycin Hives     AS A CHILD         Past Surgical History:   Procedure Laterality Date    CARDIAC CATHETERIZATION Left 2021    Procedure: Left Heart Cath with coronary angiography and possible right heart cath. Possible PTCA/stent Insertion.;  Surgeon: Dc Mckeon MD;  Location: [facility] INVASIVE LOCATION;  Service: Cardiovascular;  Laterality: Left;     SECTION      X1    CHOLECYSTECTOMY      COLONOSCOPY      COLONOSCOPY  N/A 08/17/2022    Procedure: COLONOSCOPY WITH BIOPSIES;  Surgeon: Willow Paniagua MD;  Location: [facility] ENDOSCOPY;  Service: Gastroenterology;  Laterality: N/A;  HEMORRHOIDS  DIVERTICULOSIS    CORONARY STENT PLACEMENT      ENDOSCOPY  2010    ENDOSCOPY N/A 08/17/2022    Procedure: ESOPHAGOGASTRODUODENOSCOPY WITH BIOPSIES;  Surgeon: Willow Paniagua MD;  Location: [facility] ENDOSCOPY;  Service: Gastroenterology;  Laterality: N/A;  GASTRITIS    ENDOSCOPY N/A 06/23/2023    Procedure: ESOPHAGOGASTRODUODENOSCOPY with biopsy;  Surgeon: Willow Paniagua MD;  Location: [facility] ENDOSCOPY;  Service: Gastroenterology;  Laterality: N/A;  gastric polyps    HYSTERECTOMY  1990    TOTAL SHOULDER ARTHROPLASTY W/ DISTAL CLAVICLE EXCISION Right 2/19/2025    Procedure: RIGHT TOTAL SHOULDER REVERSE ARTHROPLASTY;  Surgeon: Gaurang Downing MD;  Location: [facility] MAIN OR;  Service: Orthopedics;  Laterality: Right;    TUBAL ABDOMINAL LIGATION  1984    UPPER GASTROINTESTINAL ENDOSCOPY  2022        Social History  She  reports that she has never smoked. She has never been exposed to tobacco smoke. She has never used smokeless tobacco. She reports that she does not drink alcohol and does not use drugs.    Family History  Her family history includes Coronary artery disease in her father and mother.       Current Outpatient Medications on File Prior to Visit   Medication Sig    aspirin 325 MG EC tablet Take 1 tablet by mouth Daily.    aspirin 81 MG EC tablet Take 1 tablet by mouth Daily.    carvedilol (COREG) 6.25 MG tablet Take 1 tablet by mouth 2 (Two) Times a Day With Meals.    hydrOXYzine pamoate (VISTARIL) 50 MG capsule Take 1 capsule by mouth 3 (Three) Times a Day As Needed for Anxiety.    LORazepam (ATIVAN) 0.5 MG tablet Take 1 tablet by mouth 2 (Two) Times a Day As Needed for Anxiety.    rosuvastatin (CRESTOR) 40 MG tablet Take 1 tablet by mouth Daily. (Patient taking differently: Take 1 tablet by mouth Every Night.)  "   simethicone (MYLICON,GAS-X) 125 MG capsule capsule Take 1 capsule by mouth 4 (Four) Times a Day As Needed.    spironolactone (ALDACTONE) 25 MG tablet Take 1 tablet by mouth Daily.    Vortioxetine HBr (Trintellix) 20 MG tablet Take 1 tablet by mouth Daily With Breakfast.    HYDROcodone-acetaminophen (Norco) 7.5-325 MG per tablet Take 1-2 tablets by mouth Every 4 (Four) Hours As Needed for Moderate Pain.    [DISCONTINUED] irbesartan (AVAPRO) 300 MG tablet TAKE ONE TABLET BY MOUTH EVERY night (Patient taking differently: Take 1 tablet by mouth Daily.)    [DISCONTINUED] naloxone (NARCAN) 4 MG/0.1ML nasal spray Call 911. Don't prime. [place] in 1 nostril for overdose. Repeat in 2-3 minutes in other nostril if no or minimal breathing/responsiveness.    [DISCONTINUED] ondansetron (Zofran) 4 MG tablet Take 2 tablets by mouth Every 8 (Eight) Hours As Needed for Nausea or Vomiting.    [DISCONTINUED] oxyCODONE-acetaminophen (PERCOCET) 7.5-325 MG per tablet Take 1 tablet by mouth Every 4 (Four) Hours As Needed for Moderate Pain.     No current facility-administered medications on file prior to visit.         Review of Systems   Constitutional:  Negative for fatigue.   Respiratory:  Negative for cough, chest tightness and shortness of breath.    Cardiovascular:  Negative for chest pain, palpitations and leg swelling.   Gastrointestinal:  Negative for nausea and vomiting.   Neurological:  Negative for dizziness and syncope.        Objective   Vitals:    05/02/25 0902   BP: 150/71   Pulse: 71   Weight: 80.7 kg (178 lb)   Height: 165.1 cm (65\")         Physical Exam  General : Alert, awake, no acute distress  Neck : Supple, no carotid bruit, no jugular venous distention  CVS : Regular rate and rhythm, no murmur, no rubs or gallops  Lungs: Clear to auscultation bilaterally, no crackles or rhonchi  Abdomen: Soft, nontender, bowel sounds active  Extremities: Warm, well-perfused, no pedal edema      Result Review     The following " "data was reviewed by SRAVANTHI Montez  proBNP   Date Value Ref Range Status   01/05/2022 90.0 0.0 - 900.0 pg/mL Final     CMP          2/10/2025    09:37 2/19/2025    16:23 2/20/2025    04:16   CMP   Glucose 124  150  124    BUN 15  12  14    Creatinine 0.86  0.94  0.95    EGFR 74.6  67.1  66.2    Sodium 141  136  139    Potassium 3.9  4.1  4.3    Chloride 107  104  107    Calcium 9.0  9.1  8.7    Total Protein 6.7      Albumin 4.0      Globulin 2.7      Total Bilirubin 0.9      Alkaline Phosphatase 90      AST (SGOT) 14      ALT (SGPT) 12      Albumin/Globulin Ratio 1.5      BUN/Creatinine Ratio 17.4  12.8  14.7    Anion Gap 13.0  12.1  9.8      CBC w/diff          2/10/2025    09:37 2/19/2025    16:23 2/20/2025    04:16   CBC w/Diff   WBC 6.15  7.67  11.80    RBC 4.38  4.20  3.98    Hemoglobin 14.1  13.6  13.1    Hematocrit 41.6  39.4  37.7    MCV 95.0  93.8  94.7    MCH 32.2  32.4  32.9    MCHC 33.9  34.5  34.7    RDW 12.7  12.9  12.7    Platelets 195  179  182    Neutrophil Rel % 78.9   88.4    Immature Granulocyte Rel % 0.5   0.5    Lymphocyte Rel % 10.9   3.9    Monocyte Rel % 7.2   6.9    Eosinophil Rel % 1.8   0.0    Basophil Rel % 0.7   0.3       Lab Results   Component Value Date    TSH 1.960 08/12/2021      No results found for: \"FREET4\"   No results found for: \"DDIMERQUANT\"  Magnesium   Date Value Ref Range Status   03/10/2023 1.8 1.6 - 2.4 mg/dL Final      No results found for: \"DIGOXIN\"   Lab Results   Component Value Date    TROPONINT 10 (H) 03/10/2023                 Results for orders placed in visit on 09/15/21    Adult Transthoracic Echo Complete W/ Cont if Necessary Per Protocol    Interpretation Summary  · The left ventricular cavity is borderline dilated.  · Estimated left ventricular EF was in agreement with the calculated left ventricular EF. Left ventricular ejection fraction appears to be 61 - 65%. Left ventricular systolic function is normal.  · Left ventricular diastolic function is " consistent with (grade I) impaired relaxation.  · Estimated right ventricular systolic pressure from tricuspid regurgitation is normal (<35 mmHg).    Results for orders placed in visit on 09/15/21    Treadmill Stress Test    Interpretation Summary  · The patient reported shortness of breath during the stress test.  · Limited exercise capacity. No electrocardiographic evidence of exercise-induced ischemia           Assessment and Plan   Diagnoses and all orders for this visit:    1. Coronary artery disease involving native coronary artery of native heart without angina pectoris (Primary)  Assessment & Plan:  She is stable without symptoms of angina.  She has preserved LV systolic function.  Continue aspirin beta-blocker and statin.      2. Primary hypertension  Assessment & Plan:  Blood pressure is elevated in the office today, following her orthopedic surgery recently her blood pressures were on the lower side, she had stopped taking irbesartan.  Continue current dose carvedilol and spironolactone, monitor home BP over the next week or so, if BP is consistently above 140, we can restart irbesartan at a lower dose.    Orders:  -     Comprehensive Metabolic Panel; Future    3. Mixed hyperlipidemia  Assessment & Plan:    she had recent labs with PCP, and her levels were not well-controlled, but she had not been taking rosuvastatin regularly, she has since resumed.  She plans to have her labs rechecked with PCP in the next few weeks, will follow results, and adjust if  indicated.    Orders:  -     Comprehensive Metabolic Panel; Future  -     Lipid Panel; Future            Follow Up   Return in about 6 months (around 11/2/2025) for with Dr. Cedeño.    Patient was given instructions and counseling regarding her condition or for health maintenance advice. Please see specific information pulled into the AVS if appropriate.     Signed,  Ronda Hilario, APRN  05/02/2025     Dictated Utilizing Dragon Dictation: Please note  that portions of this note were completed with a voice recognition program.  Part of this note may be an electronic transcription/translation of spoken language to printed text using the Dragon Dictation System.

## 2025-05-02 NOTE — ASSESSMENT & PLAN NOTE
She is stable without symptoms of angina.  She has preserved LV systolic function.  Continue aspirin beta-blocker and statin.

## 2025-05-02 NOTE — ASSESSMENT & PLAN NOTE
Blood pressure is elevated in the office today, following her orthopedic surgery recently her blood pressures were on the lower side, she had stopped taking irbesartan.  Continue current dose carvedilol and spironolactone, monitor home BP over the next week or so, if BP is consistently above 140, we can restart irbesartan at a lower dose.

## 2025-06-03 ENCOUNTER — OFFICE VISIT (OUTPATIENT)
Dept: ORTHOPEDIC SURGERY | Facility: CLINIC | Age: 65
End: 2025-06-03
Payer: MEDICARE

## 2025-06-03 VITALS
BODY MASS INDEX: 29.66 KG/M2 | WEIGHT: 178 LBS | HEIGHT: 65 IN | OXYGEN SATURATION: 95 % | SYSTOLIC BLOOD PRESSURE: 146 MMHG | HEART RATE: 75 BPM | DIASTOLIC BLOOD PRESSURE: 81 MMHG

## 2025-06-03 DIAGNOSIS — Z47.89 AFTERCARE FOLLOWING SURGERY OF THE MUSCULOSKELETAL SYSTEM: Primary | ICD-10-CM

## 2025-06-03 DIAGNOSIS — M25.511 RIGHT SHOULDER PAIN, UNSPECIFIED CHRONICITY: ICD-10-CM

## 2025-06-03 NOTE — PROGRESS NOTES
"Chief Complaint  Follow-up of the Right Shoulder    Subjective          History of Present Illness      Carley Deng is a 64 y.o. female  presents to [facility] for     Patient presents for follow-up evaluation of right reverse total shoulder arthroplasty 2/19/2025.  She states her shoulder is \"doing okay \".  She states she is \"using it daily \".  She states activities of daily living she is able to use it without trouble.  Patient states she has pain \"very seldom\".  She states that she finished therapy over a month ago she states she is dealing with some anxiety and depression and that this has been her focus and she admits that she has not been doing home exercises since her therapy sessions ended.  She denies need for pain medicine or NSAIDs      Allergies   Allergen Reactions    Egg-Derived Products Anaphylaxis    Erythromycin Hives     AS A CHILD         Social History     Socioeconomic History    Marital status:    Tobacco Use    Smoking status: Never     Passive exposure: Never    Smokeless tobacco: Never   Vaping Use    Vaping status: Never Used   Substance and Sexual Activity    Alcohol use: Never    Drug use: Never    Sexual activity: Defer        REVIEW OF SYSTEMS    Constitutional: Awake alert and oriented x3, no acute distress, denies fevers, chills, weight loss  Respiratory: No respiratory distress  Vascular: Brisk cap refill, Intact distal pulses, No cyanosis, compartments soft with no signs or symptoms of compartment syndrome or DVT.   Cardiovascular: Denies chest pain, shortness of breath  Skin: Denies rashes, acute skin changes  Neurologic: Denies headache, loss of consciousness  MSK: Right shoulder pain      Objective   Vital Signs:   /81   Pulse 75   Ht 165.1 cm (65\")   Wt 80.7 kg (178 lb)   SpO2 95%   BMI 29.62 kg/m²     Body mass index is 29.62 kg/m².    Physical Exam       Right shoulder: Well-healed incision, no erythema, no ecchymosis or " swelling, no signs of infection active forward elevation 90 active abduction 80 external rotation with abduction 60 internal rotation to her side elbow wrist hand range of motion intact/appropriate, neurovascularly intact      Procedures    Imaging Results (Most Recent)       Procedure Component Value Units Date/Time    XR Scapula Right - Preliminary [[id number]] Resulted: 06/03/25 1518     Updated: 06/03/25 1518    This result has not been signed. Information might be incomplete.      Narrative:      View:AP/Lateral view(s)  Site: Right shoulder  Indication: Right shoulder pain  Study: X-rays ordered, taken in the office, and reviewed today  X-ray: Intact appearing right reverse shoulder plasty, no signs of   hardware failure or loosening, no subsidence or periprosthetic fracture,   good alignment  Comparative data: Previous studies                   Assessment and Plan    Diagnoses and all orders for this visit:    1. Aftercare following surgery of RIGHT TOTAL SHOULDER REVERSE ARTHROPLASTY 2/19/25 (Primary)    2. Right shoulder pain, unspecified chronicity  -     XR Scapula Right        Reviewed x-rays with the patient and discussed diagnosis and treatment options with her she was advised we do recommend some form of daily stretching and strengthening for her shoulder since she is unable to attend outpatient therapy due to the anxiety that it caused her she was strongly advised to do home exercises and she was given printout for this.  We discussed continuing activity as tolerated and following up in 3 months for recheck, x-ray at next visit    Call or return if worsening symptoms.    Follow Up   Return in about 3 months (around 9/3/2025).  Patient was given instructions and counseling regarding her condition or for health maintenance advice. Please see specific information pulled into the AVS if appropriate.       EMR Dragon/Transcription disclaimer:  Part of this note may be an electronic  transcription/translation of spoken language to printed text using the Dragon Dictation System

## (undated) DEVICE — DRAPE,TOWEL,LARGE,INVISISHIELD: Brand: MEDLINE

## (undated) DEVICE — GLIDESHEATH SLENDER STAINLESS STEEL KIT: Brand: GLIDESHEATH SLENDER

## (undated) DEVICE — 40580 - THE PINK PAD - ADVANCED TRENDELENBURG POSITIONING KIT: Brand: 40580 - THE PINK PAD - ADVANCED TRENDELENBURG POSITIONING KIT

## (undated) DEVICE — BLD CLIP UNIV SURG GRY

## (undated) DEVICE — MODEL BT2000 P/N 700287-012KIT CONTENTS: MANIFOLD WITH SALINE AND CONTRAST PORTS, SALINE TUBING WITH SPIKE AND HAND SYRINGE, TRANSDUCER: Brand: BT2000 AUTOMATED MANIFOLD KIT

## (undated) DEVICE — Device

## (undated) DEVICE — TREK CORONARY DILATATION CATHETER 3.0 MM X 25 MM / RAPID-EXCHANGE: Brand: TREK

## (undated) DEVICE — 6F .070 XBLAD3 100CM: Brand: VISTA BRITE TIP

## (undated) DEVICE — INTENDED FOR TISSUE SEPARATION, AND OTHER PROCEDURES THAT REQUIRE A SHARP SURGICAL BLADE TO PUNCTURE OR CUT.: Brand: BARD-PARKER ® CARBON RIB-BACK BLADES

## (undated) DEVICE — CVR LEG BOOTLEG F/R NOSKID 33IN

## (undated) DEVICE — SUT VIC 2/0 CT1 36IN

## (undated) DEVICE — SHOULDER P.A.D. III: Brand: DEROYAL

## (undated) DEVICE — SINGLE-USE BIOPSY FORCEPS: Brand: RADIAL JAW 4

## (undated) DEVICE — HI-TORQUE BALANCE MIDDLEWEIGHT UNIVERSAL GUIDE WIRE .014 STRAIGHT TIP 3.0 CM X 190 CM: Brand: HI-TORQUE BALANCE MIDDLEWEIGHT UNIVERSAL

## (undated) DEVICE — SOL IRR NACL 0.9PCT BO 1000ML

## (undated) DEVICE — DRSNG PAD ABD 8X10IN STRL

## (undated) DEVICE — PENCL SMOKE/EVAC MEGADYNE TELESCP 10FT

## (undated) DEVICE — EGD OR ERCP KIT: Brand: MEDLINE INDUSTRIES, INC.

## (undated) DEVICE — COPILOT BLEEDBACK CONTROL VALVE: Brand: COPILOT

## (undated) DEVICE — RADIFOCUS GLIDEWIRE: Brand: GLIDEWIRE

## (undated) DEVICE — CVR PROB ULTRASND GLS STRL

## (undated) DEVICE — GAUZE,SPONGE,4"X4",16PLY,STRL,LF,10/TRAY: Brand: MEDLINE

## (undated) DEVICE — DECANTER: Brand: UNBRANDED

## (undated) DEVICE — PULLOVER TOGA, 2X LARGE: Brand: FLYTE, SURGICOOL

## (undated) DEVICE — CANNULA,OXY,ADULT,SUPER SOFT,W/14'TUB,UC: Brand: MEDLINE INDUSTRIES, INC.

## (undated) DEVICE — SENSR O2 OXIMAX FNGR ADHS A/ 1P/U

## (undated) DEVICE — NC TREK CORONARY DILATATION CATHETER 3.25 MM X 20 MM / RAPID-EXCHANGE: Brand: NC TREK

## (undated) DEVICE — Device: Brand: DEFENDO AIR/WATER/SUCTION AND BIOPSY VALVE

## (undated) DEVICE — A2000 MULTI-USE SYRINGE KIT, P/N 701277-003KIT CONTENTS: 100ML CONTRAST RESERVOIR AND TUBING WITH CONTRAST SPIKE AND CLAMP: Brand: A2000 MULTI-USE SYRINGE KIT

## (undated) DEVICE — APPL CHLORAPREP HI/LITE 26ML ORNG

## (undated) DEVICE — MODEL AT P65, P/N 701554-001KIT CONTENTS: HAND CONTROLLER, 3-WAY HIGH-PRESSURE STOPCOCK WITH ROTATING END AND PREMIUM HIGH-PRESSURE TUBING: Brand: ANGIOTOUCH® KIT

## (undated) DEVICE — OSCILLATING TIP SAW CARTRIDGE: Brand: PRECISION FALCON

## (undated) DEVICE — CATH F6 ST JL 3.5 100CM: Brand: SUPERTORQUE

## (undated) DEVICE — DRP SURG U/DRP INVISISHIELD PA 48X52IN

## (undated) DEVICE — DRSNG SURESITE WNDW 4X4.5

## (undated) DEVICE — RADIFOCUS OPTITORQUE ANGIOGRAPHIC CATHETER: Brand: OPTITORQUE

## (undated) DEVICE — CATH F6 ST JR 4 100CM: Brand: SUPERTORQUE

## (undated) DEVICE — TOWEL,OR,DSP,ST,BLUE,STD,4/PK,20PK/CS: Brand: MEDLINE

## (undated) DEVICE — SOL IRRG H2O PL/BG 1000ML STRL

## (undated) DEVICE — DEV INFL BASIXCOMPAK W/INTRO/20G

## (undated) DEVICE — GW INQW FIX/CORE PTFE J/3MM .035 260CM

## (undated) DEVICE — SUT VIC PLS CTD BR 0 TIE 18IN VIL

## (undated) DEVICE — CATH 4F INF PIG 145Â° 110 CM: Brand: INFINITI

## (undated) DEVICE — SOL NACL 0.9PCT 100ML SGL

## (undated) DEVICE — SHT AIR TRANSFR COMFRT GLIDE LAT 40X80IN

## (undated) DEVICE — DRAPE,SHOULDER,BEACH ULTRAGARD: Brand: MEDLINE

## (undated) DEVICE — STERILE POLYISOPRENE POWDER-FREE SURGICAL GLOVES: Brand: PROTEXIS

## (undated) DEVICE — MAT FLR ABS W/BLU/LINER 56X72IN WHT

## (undated) DEVICE — TOTAL KNEE-LF: Brand: MEDLINE INDUSTRIES, INC.

## (undated) DEVICE — COLON KIT: Brand: MEDLINE INDUSTRIES, INC.

## (undated) DEVICE — TR BAND RADIAL ARTERY COMPRESSION DEVICE: Brand: TR BAND

## (undated) DEVICE — HANDPIECE SET WITH HIGH FLOW TIP AND SUCTION TUBE: Brand: INTERPULSE

## (undated) DEVICE — CATH IMG DRAGONFLY OPTIS 2.7F 135CM

## (undated) DEVICE — CONTAINER,SPECIMEN,O.R.STRL,4.5OZ: Brand: MEDLINE

## (undated) DEVICE — ANTIBACTERIAL VIOLET BRAIDED (POLYGLACTIN 910), SYNTHETIC ABSORBABLE SURGICAL SUTURE: Brand: COATED VICRYL

## (undated) DEVICE — STRYKER PERFORMANCE SERIES SAGITTAL BLADE: Brand: STRYKER PERFORMANCE SERIES

## (undated) DEVICE — BIPOLAR SEALER 23-112-1 AQM 6.0: Brand: AQUAMANTYS ®

## (undated) DEVICE — APPL CHLORAPREP HI/LITE TINTED 10.5ML ORNG

## (undated) DEVICE — THE STERILE LIGHT HANDLE COVER IS USED WITH STERIS SURGICAL LIGHTING AND VISUALIZATION SYSTEMS.

## (undated) DEVICE — CONTRST ISOVUE370 76PCT 100ML

## (undated) DEVICE — PROXIMATE RH ROTATING HEAD SKIN STAPLERS (35 WIDE) CONTAINS 35 STAINLESS STEEL STAPLES: Brand: PROXIMATE

## (undated) DEVICE — GLV SURG SENSICARE PI ORTHO SZ8 LF STRL

## (undated) DEVICE — SLV SCD KN/LEN ADJ EXPRSS BLENDED MD 1P/U

## (undated) DEVICE — POSTN HD UNIV